# Patient Record
Sex: MALE | Race: WHITE | NOT HISPANIC OR LATINO | Employment: OTHER | URBAN - NONMETROPOLITAN AREA
[De-identification: names, ages, dates, MRNs, and addresses within clinical notes are randomized per-mention and may not be internally consistent; named-entity substitution may affect disease eponyms.]

---

## 2023-04-20 ENCOUNTER — TELEPHONE (OUTPATIENT)
Dept: PRIMARY CARE | Facility: CLINIC | Age: 88
End: 2023-04-20
Payer: MEDICARE

## 2023-04-20 NOTE — TELEPHONE ENCOUNTER
Transition of Care    Inpatient facility: [facility]  Discharge diagnosis: PALPITATIONS/IRREGULAR HEART RATE  Discharged to: HOME  Discharge date: 4/18/23  Initial Call date: 4/20/23  Spoke with patient/caregiver: SPOKE WITH WIFE  Do you need assistance  visits prior to your PCP visit: No  Home health care ordered: No  Are you taking medications as prescribed at discharge: Yes  Patient advised to bring all medications to PCP follow-up appointment.  Patient advised to follow discharge instructions until provider follow-up.  TCM visit date: 5/5/23  TCM provider visit with: DR. AVILA

## 2023-05-05 ENCOUNTER — OFFICE VISIT (OUTPATIENT)
Dept: PRIMARY CARE | Facility: CLINIC | Age: 88
End: 2023-05-05
Payer: MEDICARE

## 2023-05-05 VITALS
HEART RATE: 78 BPM | DIASTOLIC BLOOD PRESSURE: 60 MMHG | OXYGEN SATURATION: 97 % | TEMPERATURE: 96.4 F | SYSTOLIC BLOOD PRESSURE: 118 MMHG | WEIGHT: 156.9 LBS | BODY MASS INDEX: 23.17 KG/M2

## 2023-05-05 DIAGNOSIS — I49.3 PVC'S (PREMATURE VENTRICULAR CONTRACTIONS): Primary | ICD-10-CM

## 2023-05-05 PROCEDURE — 1159F MED LIST DOCD IN RCRD: CPT

## 2023-05-05 PROCEDURE — 99495 TRANSJ CARE MGMT MOD F2F 14D: CPT

## 2023-05-05 PROCEDURE — 1036F TOBACCO NON-USER: CPT

## 2023-05-05 RX ORDER — CARVEDILOL 25 MG/1
12.5 TABLET ORAL 2 TIMES DAILY
COMMUNITY
Start: 2018-09-03

## 2023-05-05 RX ORDER — FUROSEMIDE 40 MG/1
1 TABLET ORAL
COMMUNITY
Start: 2023-01-22

## 2023-05-05 RX ORDER — POTASSIUM CHLORIDE 1500 MG/1
1 TABLET, EXTENDED RELEASE ORAL
COMMUNITY
Start: 2023-01-22 | End: 2024-04-09 | Stop reason: ALTCHOICE

## 2023-05-05 RX ORDER — LISINOPRIL 10 MG/1
TABLET ORAL EVERY 12 HOURS
COMMUNITY
Start: 2020-03-03 | End: 2023-09-23

## 2023-05-05 ASSESSMENT — ENCOUNTER SYMPTOMS
LOSS OF SENSATION IN FEET: 0
DEPRESSION: 0
OCCASIONAL FEELINGS OF UNSTEADINESS: 0

## 2023-05-05 ASSESSMENT — COLUMBIA-SUICIDE SEVERITY RATING SCALE - C-SSRS
2. HAVE YOU ACTUALLY HAD ANY THOUGHTS OF KILLING YOURSELF?: NO
1. IN THE PAST MONTH, HAVE YOU WISHED YOU WERE DEAD OR WISHED YOU COULD GO TO SLEEP AND NOT WAKE UP?: NO
6. HAVE YOU EVER DONE ANYTHING, STARTED TO DO ANYTHING, OR PREPARED TO DO ANYTHING TO END YOUR LIFE?: NO

## 2023-05-05 ASSESSMENT — PATIENT HEALTH QUESTIONNAIRE - PHQ9
1. LITTLE INTEREST OR PLEASURE IN DOING THINGS: NOT AT ALL
SUM OF ALL RESPONSES TO PHQ9 QUESTIONS 1 AND 2: 0
2. FEELING DOWN, DEPRESSED OR HOPELESS: NOT AT ALL

## 2023-05-05 NOTE — PROGRESS NOTES
"Patient: Radhames Ken  : 1933  PCP: Keily Sage DO  MRN: [id number]  Program: No linked episodes     Radhames Ken is a 89 y.o. male presenting today for follow-up after being discharged from the hospital 14 days ago. The main problem requiring admission was Palpitations. The discharge summary and/or Transitional Care Management documentation was reviewed. Medication reconciliation was performed as indicated via the \"Paulino as Reviewed\" timestamp.     Radhames Ken was contacted by Transitional Care Management services two days after his discharge. This encounter and supporting documentation was reviewed.    The complexity of medical decision making for this patient's transitional care is moderate.    Review of Systems    Physical Exam     No family history on file.    No data recorded    Assessment/Plan   Problem List Items Addressed This Visit    None  Visit Diagnoses       PVC's (premature ventricular contractions)    -  Primary    Relevant Medications    carvedilol (Coreg) 25 mg tablet            No follow-ups on file.       "

## 2023-09-21 ENCOUNTER — OFFICE VISIT (OUTPATIENT)
Dept: PRIMARY CARE | Facility: CLINIC | Age: 88
End: 2023-09-21
Payer: MEDICARE

## 2023-09-21 VITALS — OXYGEN SATURATION: 92 % | TEMPERATURE: 98.4 F | HEART RATE: 86 BPM

## 2023-09-21 DIAGNOSIS — I49.3 PVC'S (PREMATURE VENTRICULAR CONTRACTIONS): ICD-10-CM

## 2023-09-21 DIAGNOSIS — J06.9 ACUTE URI: Primary | ICD-10-CM

## 2023-09-21 PROBLEM — L30.9 ECZEMATOUS DERMATITIS: Status: ACTIVE | Noted: 2023-09-21

## 2023-09-21 PROBLEM — R05.9 COUGH: Status: ACTIVE | Noted: 2023-09-21
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
## 2023-09-21 PROBLEM — R13.10 DYSPHAGIA: Status: ACTIVE | Noted: 2023-09-21

## 2023-09-21 PROBLEM — U07.1 PNEUMONIA DUE TO COVID-19 VIRUS: Status: ACTIVE | Noted: 2023-09-21

## 2023-09-21 PROBLEM — K40.90 INGUINAL HERNIA, RIGHT: Status: ACTIVE | Noted: 2023-09-21

## 2023-09-21 PROBLEM — Z86.010 HISTORY OF COLONIC POLYPS: Status: ACTIVE | Noted: 2023-09-21

## 2023-09-21 PROBLEM — K22.2 STRICTURE OF ESOPHAGUS: Status: ACTIVE | Noted: 2023-09-21

## 2023-09-21 PROBLEM — I10 HTN (HYPERTENSION): Status: ACTIVE | Noted: 2023-09-21

## 2023-09-21 PROBLEM — Z86.0100 HISTORY OF COLONIC POLYPS: Status: ACTIVE | Noted: 2023-09-21

## 2023-09-21 PROBLEM — D64.9 ANEMIA: Status: ACTIVE | Noted: 2023-09-21

## 2023-09-21 PROBLEM — R19.7 DIARRHEA: Status: ACTIVE | Noted: 2023-09-21

## 2023-09-21 PROBLEM — C44.91 BASAL CELL ADENOCARCINOMA: Status: ACTIVE | Noted: 2023-09-21

## 2023-09-21 PROBLEM — J12.82 PNEUMONIA DUE TO COVID-19 VIRUS: Status: ACTIVE | Noted: 2023-09-21

## 2023-09-21 PROBLEM — I63.9 ISCHEMIC STROKE (MULTI): Status: ACTIVE | Noted: 2023-09-21

## 2023-09-21 PROBLEM — K21.9 GERD (GASTROESOPHAGEAL REFLUX DISEASE): Status: ACTIVE | Noted: 2023-09-21

## 2023-09-21 PROBLEM — E78.5 HYPERLIPEMIA: Status: ACTIVE | Noted: 2023-09-21

## 2023-09-21 PROBLEM — H53.452 DECREASED PERIPHERAL VISION OF LEFT EYE: Status: ACTIVE | Noted: 2023-09-21

## 2023-09-21 PROBLEM — A28.0 PASTEURELLA CELLULITIS DUE TO DOG BITE: Status: ACTIVE | Noted: 2023-09-21

## 2023-09-21 PROBLEM — R11.10 VOMITING: Status: ACTIVE | Noted: 2023-09-21

## 2023-09-21 PROBLEM — K52.832 LYMPHOCYTIC COLITIS: Status: ACTIVE | Noted: 2023-09-21

## 2023-09-21 PROCEDURE — 87635 SARS-COV-2 COVID-19 AMP PRB: CPT

## 2023-09-21 PROCEDURE — 1159F MED LIST DOCD IN RCRD: CPT

## 2023-09-21 PROCEDURE — 99214 OFFICE O/P EST MOD 30 MIN: CPT

## 2023-09-21 PROCEDURE — 1036F TOBACCO NON-USER: CPT

## 2023-09-21 RX ORDER — AMOXICILLIN AND CLAVULANATE POTASSIUM 875; 125 MG/1; MG/1
875 TABLET, FILM COATED ORAL 2 TIMES DAILY
Qty: 14 TABLET | Refills: 0 | Status: SHIPPED | OUTPATIENT
Start: 2023-09-21 | End: 2023-09-28

## 2023-09-21 NOTE — PROGRESS NOTES
Subjective   Patient ID: Radhames Ken is a 89 y.o. male who presents for Cough (Radhames is being seen today for coughing for about a week. Does feel like he has some phlegm that is stuck but not coming up. Spouse stated that he has had this before and was pneumonia. ).  Subjective  Radhames Ken is a 89 y.o. male who presents for evaluation of symptoms of a URI. Symptoms include cough described as nonproductive. Onset of symptoms was 5 days ago and has been gradually worsening since that time. Treatment to date: cough suppressants.    Objective  [unfilled]     Assessment/Plan  bronchitis and viral upper respiratory illness.    Discussed diagnosis and treatment of URI.  Suggested symptomatic OTC remedies.  Nasal saline spray for congestion.  Follow up as needed.          Vitals:    09/21/23 1438   Pulse: 86   Temp: 36.9 °C (98.4 °F)   SpO2: 92%       Review of Systems    Objective   Physical Exam    Assessment/Plan   Problem List Items Addressed This Visit    None           Thank you for coming in today, please call my office if you have any concerns or questions.     Harman CARREON, CNP

## 2023-09-22 ENCOUNTER — TELEPHONE (OUTPATIENT)
Dept: PRIMARY CARE | Facility: CLINIC | Age: 88
End: 2023-09-22
Payer: MEDICARE

## 2023-09-22 DIAGNOSIS — I10 ESSENTIAL (PRIMARY) HYPERTENSION: ICD-10-CM

## 2023-09-22 LAB — SARS-COV-2 RESULT: NOT DETECTED

## 2023-09-23 RX ORDER — LISINOPRIL 10 MG/1
10 TABLET ORAL 2 TIMES DAILY
Qty: 180 TABLET | Refills: 2 | Status: SHIPPED | OUTPATIENT
Start: 2023-09-23 | End: 2024-04-24

## 2023-10-02 ENCOUNTER — HOSPITAL ENCOUNTER (OUTPATIENT)
Dept: RADIOLOGY | Facility: HOSPITAL | Age: 88
Discharge: HOME | End: 2023-10-02
Payer: MEDICARE

## 2023-10-02 ENCOUNTER — OFFICE VISIT (OUTPATIENT)
Dept: PRIMARY CARE | Facility: CLINIC | Age: 88
End: 2023-10-02
Payer: MEDICARE

## 2023-10-02 VITALS — HEART RATE: 76 BPM | OXYGEN SATURATION: 95 % | TEMPERATURE: 97.4 F

## 2023-10-02 DIAGNOSIS — J06.9 ACUTE URI: ICD-10-CM

## 2023-10-02 DIAGNOSIS — R05.1 ACUTE COUGH: Primary | ICD-10-CM

## 2023-10-02 PROCEDURE — 1159F MED LIST DOCD IN RCRD: CPT

## 2023-10-02 PROCEDURE — 99214 OFFICE O/P EST MOD 30 MIN: CPT

## 2023-10-02 PROCEDURE — 71046 X-RAY EXAM CHEST 2 VIEWS: CPT | Mod: FY

## 2023-10-02 PROCEDURE — 71046 X-RAY EXAM CHEST 2 VIEWS: CPT | Performed by: RADIOLOGY

## 2023-10-02 PROCEDURE — 1036F TOBACCO NON-USER: CPT

## 2023-10-02 RX ORDER — PREDNISONE 10 MG/1
10 TABLET ORAL 3 TIMES DAILY
Qty: 9 TABLET | Refills: 0 | Status: SHIPPED | OUTPATIENT
Start: 2023-10-02 | End: 2023-10-05

## 2023-10-02 RX ORDER — AZITHROMYCIN 250 MG/1
TABLET, FILM COATED ORAL
Qty: 6 TABLET | Refills: 0 | Status: SHIPPED | OUTPATIENT
Start: 2023-10-02 | End: 2023-10-07

## 2023-10-02 RX ORDER — ALBUTEROL SULFATE 90 UG/1
2 AEROSOL, METERED RESPIRATORY (INHALATION) EVERY 4 HOURS PRN
Qty: 8 G | Refills: 5 | Status: SHIPPED | OUTPATIENT
Start: 2023-10-02 | End: 2023-11-06 | Stop reason: SINTOL

## 2023-10-02 NOTE — PROGRESS NOTES
Subjective   Patient ID: Radhames Ken is a 89 y.o. male who presents for Cough (Radhames is being seen today for a cough. Has been worsening with some clear mucous production. Was not aware of xray orders. ).  Subjective  Patient ID: Radhames Ken is a 89 y.o. male here for follow-up of cough. Onset of symptoms was 2 weeks ago. Symptoms are gradually worsening since that time. He was treated with symptomatic meds and antibiotics. Persistent symptoms include productive cough.     Patient presents with:  Cough: Radhames is being seen today for a cough. Has been worsening with some clear mucous production. Was not aware of xray orders.     @HPI@    The following portions of the chart were reviewed this encounter and updated as appropriate:         [unfilled]    Objective  [unfilled]    Assessment/Plan  Problem List Items Addressed This Visit           ICD-10-CM    Cough - Primary R05.9    Relevant Medications    azithromycin (Zithromax) 250 mg tablet    predniSONE (Deltasone) 10 mg tablet    Other Relevant Orders    RSV PCR    Sars-CoV-2 PCR, Symptomatic    Influenza A, and B PCR   Other Visit Diagnoses       Codes    Acute URI     J06.9    Relevant Medications    dextromethorphan-guaifenesin (Mucinex DM)  mg 12 hr tablet              Vitals:    10/02/23 1348   Pulse: 76   Temp: 36.3 °C (97.4 °F)   SpO2: 95%       Review of Systems    Objective   Physical Exam    Assessment/Plan   Problem List Items Addressed This Visit       Cough - Primary    Relevant Medications    azithromycin (Zithromax) 250 mg tablet    predniSONE (Deltasone) 10 mg tablet    Other Relevant Orders    Influenza A, and B PCR    RSV PCR    Sars-CoV-2 PCR, Symptomatic     Other Visit Diagnoses       Acute URI        Relevant Medications    dextromethorphan-guaifenesin (Mucinex DM)  mg 12 hr tablet                 Thank you for coming in today, please call my office if you have any concerns or questions.     Harman CARREON, CNP

## 2023-10-03 LAB
FLUAV RNA RESP QL NAA+PROBE: NOT DETECTED
FLUBV RNA RESP QL NAA+PROBE: NOT DETECTED
RSV RNA RESP QL NAA+PROBE: NOT DETECTED
SARS-COV-2 RNA RESP QL NAA+PROBE: NOT DETECTED

## 2023-10-04 ENCOUNTER — TELEPHONE (OUTPATIENT)
Dept: PRIMARY CARE | Facility: CLINIC | Age: 88
End: 2023-10-04
Payer: MEDICARE

## 2023-10-06 ENCOUNTER — TELEPHONE (OUTPATIENT)
Dept: PRIMARY CARE | Facility: CLINIC | Age: 88
End: 2023-10-06
Payer: MEDICARE

## 2023-11-06 ENCOUNTER — OFFICE VISIT (OUTPATIENT)
Dept: PRIMARY CARE | Facility: CLINIC | Age: 88
End: 2023-11-06
Payer: MEDICARE

## 2023-11-06 VITALS
DIASTOLIC BLOOD PRESSURE: 70 MMHG | WEIGHT: 156 LBS | HEART RATE: 85 BPM | SYSTOLIC BLOOD PRESSURE: 130 MMHG | BODY MASS INDEX: 23.04 KG/M2 | TEMPERATURE: 99 F | OXYGEN SATURATION: 98 %

## 2023-11-06 DIAGNOSIS — R05.1 ACUTE COUGH: Primary | ICD-10-CM

## 2023-11-06 PROCEDURE — 3075F SYST BP GE 130 - 139MM HG: CPT | Performed by: FAMILY MEDICINE

## 2023-11-06 PROCEDURE — 1159F MED LIST DOCD IN RCRD: CPT | Performed by: FAMILY MEDICINE

## 2023-11-06 PROCEDURE — 1036F TOBACCO NON-USER: CPT | Performed by: FAMILY MEDICINE

## 2023-11-06 PROCEDURE — 99214 OFFICE O/P EST MOD 30 MIN: CPT | Performed by: FAMILY MEDICINE

## 2023-11-06 PROCEDURE — 3078F DIAST BP <80 MM HG: CPT | Performed by: FAMILY MEDICINE

## 2023-11-06 RX ORDER — AZITHROMYCIN 250 MG/1
TABLET, FILM COATED ORAL
Qty: 6 TABLET | Refills: 0 | Status: SHIPPED | OUTPATIENT
Start: 2023-11-06 | End: 2023-11-11

## 2023-11-06 RX ORDER — PREDNISONE 10 MG/1
TABLET ORAL
Qty: 9 TABLET | Refills: 0 | OUTPATIENT
Start: 2023-11-06 | End: 2023-11-22

## 2023-11-06 NOTE — PATIENT INSTRUCTIONS
Water and rest  Sent z-vince and a short coarse of prednisone  See cardiology if this does not resolve

## 2023-11-09 ASSESSMENT — ENCOUNTER SYMPTOMS
JOINT SWELLING: 0
UNEXPECTED WEIGHT CHANGE: 0
HEADACHES: 0
ABDOMINAL PAIN: 0
COUGH: 1
DYSPHORIC MOOD: 0
SLEEP DISTURBANCE: 0
DYSURIA: 0
RHINORRHEA: 0
PALPITATIONS: 0
BLOOD IN STOOL: 0
LIGHT-HEADEDNESS: 0
MYALGIAS: 0
WEAKNESS: 0
DIZZINESS: 0
VOMITING: 0
NERVOUS/ANXIOUS: 0
EYE DISCHARGE: 0
HEMATURIA: 0
WHEEZING: 0
SHORTNESS OF BREATH: 0
FEVER: 0
SINUS PRESSURE: 0
APPETITE CHANGE: 0
NAUSEA: 0
NUMBNESS: 0
FATIGUE: 1
DIARRHEA: 0
ARTHRALGIAS: 0
SORE THROAT: 0
FLANK PAIN: 0
CONSTIPATION: 0
ACTIVITY CHANGE: 0
EYE ITCHING: 0

## 2023-11-09 NOTE — PROGRESS NOTES
Subjective   Patient ID: Radhames Ken is a 89 y.o. male who presents for chest congestion (Able to cough some phlegm at times. X 2 weeks. Looks white when expelled./Treated by Shalom and it did help, but came back.).    HPI HE ALWAYS FEELS LIKE HE HAS PHLEGM THAT HE CAN'T COUGH OUT OR CLEAR   HE WAS TREATED P[PREVIOUSLY WITH ANTIBIOTIC AND STEROIDS AND HE THOUGHT HE WAS BETTER FOR AWHILE BUT NOW THE SAME SYMPTOMS ARE BACK     Review of Systems   Constitutional:  Positive for fatigue. Negative for activity change, appetite change, fever and unexpected weight change.   HENT:  Negative for congestion, ear pain, postnasal drip, rhinorrhea, sinus pressure and sore throat.    Eyes:  Negative for discharge, itching and visual disturbance.   Respiratory:  Positive for cough. Negative for shortness of breath and wheezing.    Cardiovascular:  Negative for chest pain, palpitations and leg swelling.   Gastrointestinal:  Negative for abdominal pain, blood in stool, constipation, diarrhea, nausea and vomiting.   Endocrine: Negative for cold intolerance, heat intolerance and polyuria.   Genitourinary:  Negative for dysuria, flank pain and hematuria.   Musculoskeletal:  Negative for arthralgias, gait problem, joint swelling and myalgias.   Skin:  Negative for rash.   Allergic/Immunologic: Negative for environmental allergies and food allergies.   Neurological:  Negative for dizziness, syncope, weakness, light-headedness, numbness and headaches.   Psychiatric/Behavioral:  Negative for dysphoric mood and sleep disturbance. The patient is not nervous/anxious.        Objective   /70   Pulse 85   Temp 37.2 °C (99 °F)   Wt 70.8 kg (156 lb)   SpO2 98%   BMI 23.04 kg/m²     Physical Exam  Vitals and nursing note reviewed.   Constitutional:       Appearance: Normal appearance.   HENT:      Head: Normocephalic.      Mouth/Throat:      Mouth: Mucous membranes are moist.   Cardiovascular:      Rate and Rhythm: Normal rate and  regular rhythm.      Pulses: Normal pulses.      Heart sounds: Normal heart sounds. No murmur heard.     No friction rub. No gallop.   Pulmonary:      Effort: Pulmonary effort is normal. No respiratory distress.      Breath sounds: Normal breath sounds. No wheezing, rhonchi or rales.   Abdominal:      General: Bowel sounds are normal. There is no distension.      Palpations: Abdomen is soft.      Tenderness: There is no abdominal tenderness.   Musculoskeletal:         General: No deformity. Normal range of motion.      Right lower leg: No edema.      Left lower leg: No edema.   Skin:     General: Skin is warm and dry.      Capillary Refill: Capillary refill takes less than 2 seconds.   Neurological:      General: No focal deficit present.      Mental Status: He is alert and oriented to person, place, and time.   Psychiatric:         Mood and Affect: Mood normal.         Assessment/Plan   Diagnoses and all orders for this visit:  Acute cough  -     predniSONE (Deltasone) 10 mg tablet; One three times daily for three days  -     azithromycin (Zithromax) 250 mg tablet; Take 2 tablets (500 mg) by mouth once daily for 1 day, THEN 1 tablet (250 mg) once daily for 4 days. Take 2 tabs (500 mg) by mouth today, than 1 daily for 4 days..

## 2023-11-15 ENCOUNTER — TELEPHONE (OUTPATIENT)
Dept: PRIMARY CARE | Facility: CLINIC | Age: 88
End: 2023-11-15
Payer: MEDICARE

## 2023-11-15 NOTE — TELEPHONE ENCOUNTER
Spoke with daughter, Wiliam, (HIPAA) who verbalized understanding and will reach out to his cardiologist's office to see if he needs to be seen sooner than 11/27

## 2023-11-15 NOTE — TELEPHONE ENCOUNTER
Daughter called stating patient is still wheezing and coughing intermittently. He has completed his medication. Not sure what else can be done. Was just seen in office 11/9/23 for these symptoms.

## 2023-11-20 ENCOUNTER — LAB (OUTPATIENT)
Dept: LAB | Facility: LAB | Age: 88
End: 2023-11-20
Payer: MEDICARE

## 2023-11-20 ENCOUNTER — TELEPHONE (OUTPATIENT)
Dept: PRIMARY CARE | Facility: CLINIC | Age: 88
End: 2023-11-20
Payer: MEDICARE

## 2023-11-20 ENCOUNTER — HOSPITAL ENCOUNTER (OUTPATIENT)
Dept: RADIOLOGY | Facility: HOSPITAL | Age: 88
Discharge: HOME | End: 2023-11-20
Payer: MEDICARE

## 2023-11-20 DIAGNOSIS — R06.02 SHORTNESS OF BREATH: ICD-10-CM

## 2023-11-20 DIAGNOSIS — R05.3 CHRONIC COUGH: Primary | ICD-10-CM

## 2023-11-20 DIAGNOSIS — R05.3 CHRONIC COUGH: ICD-10-CM

## 2023-11-20 LAB
ANION GAP SERPL CALC-SCNC: 9 MMOL/L (ref 10–20)
BASOPHILS # BLD AUTO: 0.07 X10*3/UL (ref 0–0.1)
BASOPHILS NFR BLD AUTO: 0.6 %
BNP SERPL-MCNC: 502 PG/ML (ref 0–99)
BUN SERPL-MCNC: 20 MG/DL (ref 6–23)
CALCIUM SERPL-MCNC: 8.8 MG/DL (ref 8.6–10.3)
CHLORIDE SERPL-SCNC: 97 MMOL/L (ref 98–107)
CO2 SERPL-SCNC: 31 MMOL/L (ref 21–32)
CREAT SERPL-MCNC: 1.02 MG/DL (ref 0.5–1.3)
EOSINOPHIL # BLD AUTO: 0.37 X10*3/UL (ref 0–0.4)
EOSINOPHIL NFR BLD AUTO: 3.1 %
ERYTHROCYTE [DISTWIDTH] IN BLOOD BY AUTOMATED COUNT: 13.2 % (ref 11.5–14.5)
GFR SERPL CREATININE-BSD FRML MDRD: 70 ML/MIN/1.73M*2
GLUCOSE SERPL-MCNC: 130 MG/DL (ref 74–99)
HCT VFR BLD AUTO: 46.6 % (ref 41–52)
HGB BLD-MCNC: 15.4 G/DL (ref 13.5–17.5)
IMM GRANULOCYTES # BLD AUTO: 0.06 X10*3/UL (ref 0–0.5)
IMM GRANULOCYTES NFR BLD AUTO: 0.5 % (ref 0–0.9)
LYMPHOCYTES # BLD AUTO: 0.88 X10*3/UL (ref 0.8–3)
LYMPHOCYTES NFR BLD AUTO: 7.3 %
MCH RBC QN AUTO: 30.7 PG (ref 26–34)
MCHC RBC AUTO-ENTMCNC: 33 G/DL (ref 32–36)
MCV RBC AUTO: 93 FL (ref 80–100)
MONOCYTES # BLD AUTO: 0.78 X10*3/UL (ref 0.05–0.8)
MONOCYTES NFR BLD AUTO: 6.5 %
NEUTROPHILS # BLD AUTO: 9.89 X10*3/UL (ref 1.6–5.5)
NEUTROPHILS NFR BLD AUTO: 82 %
NRBC BLD-RTO: 0 /100 WBCS (ref 0–0)
PLATELET # BLD AUTO: 142 X10*3/UL (ref 150–450)
POTASSIUM SERPL-SCNC: 3.9 MMOL/L (ref 3.5–5.3)
RBC # BLD AUTO: 5.02 X10*6/UL (ref 4.5–5.9)
SODIUM SERPL-SCNC: 133 MMOL/L (ref 136–145)
WBC # BLD AUTO: 12.1 X10*3/UL (ref 4.4–11.3)

## 2023-11-20 PROCEDURE — 36415 COLL VENOUS BLD VENIPUNCTURE: CPT

## 2023-11-20 PROCEDURE — 71046 X-RAY EXAM CHEST 2 VIEWS: CPT

## 2023-11-20 PROCEDURE — 83880 ASSAY OF NATRIURETIC PEPTIDE: CPT

## 2023-11-20 PROCEDURE — 71046 X-RAY EXAM CHEST 2 VIEWS: CPT | Performed by: RADIOLOGY

## 2023-11-20 PROCEDURE — 80048 BASIC METABOLIC PNL TOTAL CA: CPT

## 2023-11-20 PROCEDURE — 85025 COMPLETE CBC W/AUTO DIFF WBC: CPT

## 2023-11-20 NOTE — TELEPHONE ENCOUNTER
Daughter Wiliam [phone]. Radhames has appt with cardiology on 11-27.  Due to his cough she was wondering if you would order a Xray.

## 2023-11-22 ENCOUNTER — HOSPITAL ENCOUNTER (EMERGENCY)
Facility: HOSPITAL | Age: 88
Discharge: HOME | End: 2023-11-22
Payer: MEDICARE

## 2023-11-22 ENCOUNTER — APPOINTMENT (OUTPATIENT)
Dept: CARDIOLOGY | Facility: HOSPITAL | Age: 88
End: 2023-11-22
Payer: MEDICARE

## 2023-11-22 ENCOUNTER — PHARMACY VISIT (OUTPATIENT)
Dept: PHARMACY | Facility: CLINIC | Age: 88
End: 2023-11-22
Payer: COMMERCIAL

## 2023-11-22 ENCOUNTER — APPOINTMENT (OUTPATIENT)
Dept: RADIOLOGY | Facility: HOSPITAL | Age: 88
End: 2023-11-22
Payer: MEDICARE

## 2023-11-22 VITALS
DIASTOLIC BLOOD PRESSURE: 81 MMHG | OXYGEN SATURATION: 95 % | SYSTOLIC BLOOD PRESSURE: 138 MMHG | TEMPERATURE: 97.5 F | HEIGHT: 71 IN | WEIGHT: 162.04 LBS | HEART RATE: 77 BPM | BODY MASS INDEX: 22.69 KG/M2 | RESPIRATION RATE: 20 BRPM

## 2023-11-22 VITALS — HEART RATE: 74 BPM | OXYGEN SATURATION: 95 % | RESPIRATION RATE: 20 BRPM

## 2023-11-22 DIAGNOSIS — I50.32 CHRONIC DIASTOLIC (CONGESTIVE) HEART FAILURE (MULTI): ICD-10-CM

## 2023-11-22 DIAGNOSIS — J18.9 PNEUMONIA OF BOTH LOWER LOBES DUE TO INFECTIOUS ORGANISM: Primary | ICD-10-CM

## 2023-11-22 LAB
ALBUMIN SERPL BCP-MCNC: 3.2 G/DL (ref 3.4–5)
ALP SERPL-CCNC: 88 U/L (ref 33–136)
ALT SERPL W P-5'-P-CCNC: 13 U/L (ref 10–52)
ANION GAP SERPL CALC-SCNC: 11 MMOL/L (ref 10–20)
AST SERPL W P-5'-P-CCNC: 13 U/L (ref 9–39)
BASOPHILS # BLD AUTO: 0.06 X10*3/UL (ref 0–0.1)
BASOPHILS NFR BLD AUTO: 0.7 %
BILIRUB SERPL-MCNC: 1.5 MG/DL (ref 0–1.2)
BNP SERPL-MCNC: 391 PG/ML (ref 0–99)
BUN SERPL-MCNC: 19 MG/DL (ref 6–23)
CALCIUM SERPL-MCNC: 8.7 MG/DL (ref 8.6–10.3)
CARDIAC TROPONIN I PNL SERPL HS: 21 NG/L (ref 0–20)
CARDIAC TROPONIN I PNL SERPL HS: 23 NG/L (ref 0–20)
CHLORIDE SERPL-SCNC: 96 MMOL/L (ref 98–107)
CO2 SERPL-SCNC: 28 MMOL/L (ref 21–32)
CREAT SERPL-MCNC: 0.98 MG/DL (ref 0.5–1.3)
D DIMER PPP FEU-MCNC: 3366 NG/ML FEU
EOSINOPHIL # BLD AUTO: 0.56 X10*3/UL (ref 0–0.4)
EOSINOPHIL NFR BLD AUTO: 6.4 %
ERYTHROCYTE [DISTWIDTH] IN BLOOD BY AUTOMATED COUNT: 13.1 % (ref 11.5–14.5)
FLUAV RNA RESP QL NAA+PROBE: NOT DETECTED
FLUBV RNA RESP QL NAA+PROBE: NOT DETECTED
GFR SERPL CREATININE-BSD FRML MDRD: 73 ML/MIN/1.73M*2
GLUCOSE SERPL-MCNC: 174 MG/DL (ref 74–99)
HCT VFR BLD AUTO: 44.8 % (ref 41–52)
HGB BLD-MCNC: 15.1 G/DL (ref 13.5–17.5)
IMM GRANULOCYTES # BLD AUTO: 0.02 X10*3/UL (ref 0–0.5)
IMM GRANULOCYTES NFR BLD AUTO: 0.2 % (ref 0–0.9)
INR PPP: 1.2 (ref 0.9–1.1)
LYMPHOCYTES # BLD AUTO: 0.85 X10*3/UL (ref 0.8–3)
LYMPHOCYTES NFR BLD AUTO: 9.7 %
MAGNESIUM SERPL-MCNC: 2 MG/DL (ref 1.6–2.4)
MCH RBC QN AUTO: 30.7 PG (ref 26–34)
MCHC RBC AUTO-ENTMCNC: 33.7 G/DL (ref 32–36)
MCV RBC AUTO: 91 FL (ref 80–100)
MONOCYTES # BLD AUTO: 0.47 X10*3/UL (ref 0.05–0.8)
MONOCYTES NFR BLD AUTO: 5.4 %
NEUTROPHILS # BLD AUTO: 6.78 X10*3/UL (ref 1.6–5.5)
NEUTROPHILS NFR BLD AUTO: 77.6 %
NRBC BLD-RTO: 0 /100 WBCS (ref 0–0)
PLATELET # BLD AUTO: 165 X10*3/UL (ref 150–450)
POTASSIUM SERPL-SCNC: 3.5 MMOL/L (ref 3.5–5.3)
PROT SERPL-MCNC: 5.7 G/DL (ref 6.4–8.2)
PROTHROMBIN TIME: 13.5 SECONDS (ref 9.8–12.8)
RBC # BLD AUTO: 4.92 X10*6/UL (ref 4.5–5.9)
SARS-COV-2 RNA RESP QL NAA+PROBE: NOT DETECTED
SODIUM SERPL-SCNC: 131 MMOL/L (ref 136–145)
WBC # BLD AUTO: 8.7 X10*3/UL (ref 4.4–11.3)

## 2023-11-22 PROCEDURE — 85610 PROTHROMBIN TIME: CPT | Performed by: EMERGENCY MEDICINE

## 2023-11-22 PROCEDURE — 85379 FIBRIN DEGRADATION QUANT: CPT | Performed by: EMERGENCY MEDICINE

## 2023-11-22 PROCEDURE — 85025 COMPLETE CBC W/AUTO DIFF WBC: CPT | Performed by: EMERGENCY MEDICINE

## 2023-11-22 PROCEDURE — 2500000002 HC RX 250 W HCPCS SELF ADMINISTERED DRUGS (ALT 637 FOR MEDICARE OP, ALT 636 FOR OP/ED): Performed by: PHYSICIAN ASSISTANT

## 2023-11-22 PROCEDURE — 99285 EMERGENCY DEPT VISIT HI MDM: CPT | Mod: 25

## 2023-11-22 PROCEDURE — 84484 ASSAY OF TROPONIN QUANT: CPT | Performed by: EMERGENCY MEDICINE

## 2023-11-22 PROCEDURE — 2550000001 HC RX 255 CONTRASTS: Performed by: PHYSICIAN ASSISTANT

## 2023-11-22 PROCEDURE — 71045 X-RAY EXAM CHEST 1 VIEW: CPT | Performed by: RADIOLOGY

## 2023-11-22 PROCEDURE — 36415 COLL VENOUS BLD VENIPUNCTURE: CPT | Performed by: EMERGENCY MEDICINE

## 2023-11-22 PROCEDURE — 87636 SARSCOV2 & INF A&B AMP PRB: CPT | Performed by: EMERGENCY MEDICINE

## 2023-11-22 PROCEDURE — 80053 COMPREHEN METABOLIC PANEL: CPT | Performed by: EMERGENCY MEDICINE

## 2023-11-22 PROCEDURE — 2500000004 HC RX 250 GENERAL PHARMACY W/ HCPCS (ALT 636 FOR OP/ED): Performed by: PHYSICIAN ASSISTANT

## 2023-11-22 PROCEDURE — 83735 ASSAY OF MAGNESIUM: CPT | Performed by: EMERGENCY MEDICINE

## 2023-11-22 PROCEDURE — 83880 ASSAY OF NATRIURETIC PEPTIDE: CPT | Performed by: EMERGENCY MEDICINE

## 2023-11-22 PROCEDURE — 71045 X-RAY EXAM CHEST 1 VIEW: CPT

## 2023-11-22 PROCEDURE — 71275 CT ANGIOGRAPHY CHEST: CPT

## 2023-11-22 PROCEDURE — 71275 CT ANGIOGRAPHY CHEST: CPT | Performed by: RADIOLOGY

## 2023-11-22 PROCEDURE — 94640 AIRWAY INHALATION TREATMENT: CPT

## 2023-11-22 PROCEDURE — RXMED WILLOW AMBULATORY MEDICATION CHARGE

## 2023-11-22 PROCEDURE — 93306 TTE W/DOPPLER COMPLETE: CPT

## 2023-11-22 RX ORDER — DOXYCYCLINE 100 MG/1
100 TABLET ORAL 2 TIMES DAILY
Qty: 14 TABLET | Refills: 0 | Status: SHIPPED | OUTPATIENT
Start: 2023-11-22 | End: 2023-11-29

## 2023-11-22 RX ORDER — IPRATROPIUM BROMIDE AND ALBUTEROL SULFATE 2.5; .5 MG/3ML; MG/3ML
3 SOLUTION RESPIRATORY (INHALATION)
Status: DISCONTINUED | OUTPATIENT
Start: 2023-11-22 | End: 2023-11-22 | Stop reason: HOSPADM

## 2023-11-22 RX ORDER — PREDNISONE 20 MG/1
20 TABLET ORAL 2 TIMES DAILY
Qty: 10 TABLET | Refills: 0 | Status: SHIPPED | OUTPATIENT
Start: 2023-11-22 | End: 2023-11-27

## 2023-11-22 RX ORDER — IPRATROPIUM BROMIDE AND ALBUTEROL SULFATE 2.5; .5 MG/3ML; MG/3ML
3 SOLUTION RESPIRATORY (INHALATION)
Qty: 360 ML | Refills: 0 | Status: SHIPPED | OUTPATIENT
Start: 2023-11-22 | End: 2024-01-22 | Stop reason: SDUPTHER

## 2023-11-22 RX ADMIN — IOHEXOL 75 ML: 350 INJECTION, SOLUTION INTRAVENOUS at 12:57

## 2023-11-22 RX ADMIN — IPRATROPIUM BROMIDE AND ALBUTEROL SULFATE 3 ML: .5; 3 SOLUTION RESPIRATORY (INHALATION) at 19:00

## 2023-11-22 RX ADMIN — METHYLPREDNISOLONE SODIUM SUCCINATE 125 MG: 125 INJECTION, POWDER, FOR SOLUTION INTRAMUSCULAR; INTRAVENOUS at 17:40

## 2023-11-22 ASSESSMENT — PAIN - FUNCTIONAL ASSESSMENT
PAIN_FUNCTIONAL_ASSESSMENT: 0-10
PAIN_FUNCTIONAL_ASSESSMENT: 0-10

## 2023-11-22 ASSESSMENT — COLUMBIA-SUICIDE SEVERITY RATING SCALE - C-SSRS
1. IN THE PAST MONTH, HAVE YOU WISHED YOU WERE DEAD OR WISHED YOU COULD GO TO SLEEP AND NOT WAKE UP?: NO
6. HAVE YOU EVER DONE ANYTHING, STARTED TO DO ANYTHING, OR PREPARED TO DO ANYTHING TO END YOUR LIFE?: NO
2. HAVE YOU ACTUALLY HAD ANY THOUGHTS OF KILLING YOURSELF?: NO

## 2023-11-22 ASSESSMENT — PAIN SCALES - GENERAL
PAINLEVEL_OUTOF10: 0 - NO PAIN
PAINLEVEL_OUTOF10: 0 - NO PAIN

## 2023-11-22 NOTE — ED NOTES
Pt came in from home via private vehicle where he lives independently w/wife denied any use of a walker, cane or oxygen he is Ao x3 but a poor historian Pt SOB since October has an appt for and echo next Monday w/ Dr Dotson but per wife Dr matteo Sage stated not to wait so they brought him to the ER pt has audible expiratory wheezing he takes lasix daily no increased edema he denied injury trauma CP N/V/D constipation fever or urinary symptoms wife at bedside call bell w/in reach safety maintained     Myrna De Leon RN  11/22/23 0415

## 2023-11-22 NOTE — ED PROVIDER NOTES
HPI   No chief complaint on file.      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    Diarrhea, functional   • Other disorders of plasma-protein metabolism, not elsewhere classified 05/30/2017    Serum albumin decreased   • 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   • Personal history of diseases of the skin and subcutaneous tissue 02/18/2019    History of eczema   • Personal history of other diseases of male genital organs     History of BPH   • Personal history of other diseases of the circulatory system     History of hypertension   • Personal history of other diseases of the circulatory system     History of mitral valve insufficiency   • Personal history of other diseases of the musculoskeletal system and connective tissue     Personal history of arthritis   • Personal history of other endocrine, nutritional and metabolic disease     History of hyperlipidemia   • Personal history of other specified conditions 11/09/2018    History of seizure   • Personal history of other specified conditions 05/05/2022    History of vomiting     Past Surgical History:   Procedure Laterality Date   • CARDIAC SURGERY  04/28/2014    Heart Surgery   • CHOLECYSTECTOMY  04/28/2014    Cholecystectomy   • GALLBLADDER SURGERY  04/28/2014    Gallbladder Surgery   • MR HEAD ANGIO WO IV CONTRAST  9/30/2018    MR HEAD ANGIO WO IV CONTRAST 9/30/2018 [facility] CLINICAL  LEGACY   • MR NECK ANGIO WO IV CONTRAST  9/30/2018    MR NECK ANGIO WO IV CONTRAST 9/30/2018 [facility] CLINICAL LEGACY   • OTHER SURGICAL HISTORY  04/28/2014    Knee Repair   • OTHER SURGICAL HISTORY  04/28/2014    Needle Biopsy Of Prostate   • OTHER SURGICAL HISTORY  03/05/2021    Complete colonoscopy   • OTHER SURGICAL HISTORY  03/05/2021    Endoscopy   • OTHER SURGICAL HISTORY  10/08/2018    Common Bile Duct Stone Removal   • PROSTATE SURGERY  10/08/2018    Prostate Surgery     Family History   Problem Relation Name Age of Onset   • Heart disease Mother     • Breast cancer Mother     • Lung cancer Father     • Cancer Brother     • Cancer Brother     • Cancer Brother     • Cancer Brother       Social History     Tobacco Use   • Smoking status: Never   • Smokeless tobacco: Never   Vaping Use   • Vaping Use: Never used   Substance Use Topics   • Alcohol use: Not Currently   • Drug use: Never       Physical Exam   ED Triage Vitals [11/22/23 1724]   Temp Heart Rate Resp BP   -- 74 20 --      SpO2 Temp src Heart Rate Source Patient Position   95 % -- -- --      BP Location FiO2 (%)     -- --       Physical Exam    ED Course & MDM   ED Course as of 11/25/23 2130 Wed Nov 22, 2023 2244 EKG taken at 1020 on November 22, 2023 shows sinus rhythm at 88 with frequent PVCs and left axis deviation no ST elevation depression no other acute findings [JF]      ED Course User Index  [JF] Juan Hernandez PA-C         Diagnoses as of 11/25/23 2130   Pneumonia of both lower lobes due to infectious organism       Medical Decision Making      Procedure  Procedures     Juan Hernandez PA-C  11/22/23 2053       Juan Hernandez PA-C  11/25/23 2130

## 2023-11-23 NOTE — ED PROVIDER NOTES
HPI   Chief Complaint   Patient presents with    Shortness of Breath     Pt SOB since October has an appt for and echo next Monday w/ Dr Dotson but per wife Dr matteo Sage stated not to wait so they brought him to the ER       History of present illness:  89-year-old male presents to the emergency room for complaints of wheezing, shortness of breath and general fatigue.  The patient is companied by his family who states that the patient has a history of aortic stenosis and he is scheduled to have a valve replacement performed next week.  They state that they are concerned as he also has a history of heart failure coronary disease and states that he has not had any chest pain but he has been come increasingly short of breath and they are concerned that he is becoming fluid overloaded.  He states he has not had a swelling in his legs and they state that he has been acting appropriate otherwise.  He states he has albuterol inhaler he has been needing as needed.  The patient at this time states that he feels okay while sitting here talking to the practitioner but states he quickly becomes short of breath with exertion.  They contacted patient's cardiologist yesterday and were told that he could have an echocardiogram done in outpatient setting next week.    Social history: Negative for alcohol and drug use.    Review of systems:   Gen.: No weight loss,  fever.   Eyes: No vision loss  ENT: No pharyngitis, neck pain  Cardiac: No chest pain, palpitations, syncope, near syncope.   Pulmonary: No  hemoptysis.   Heme/lymph: No swollen glands, fever, bleeding.   GI: No abdominal pain, change in bowel habits, melena, hematemesis, hematochezia, nausea, vomiting, diarrhea.   : No discharge, dysuria, frequency, urgency, hematuria.   Musculoskeletal: No limb pain, joint pain, joint swelling.   Skin: No rashes.   Review of systems is otherwise negative unless stated above or in history of present illness.        Physical  exam:  General: Vitals noted, no distress. Afebrile.   EENT: No lymphadenopathy appreciated  Cardiac: Regular, rate, rhythm, no murmur.   Pulmonary: Lungs clear bilaterally with good aeration. No adventitious breath sounds.   Abdomen: Soft, nonsurgical. Nontender. No peritoneal signs. Normoactive bowel sounds.   Extremities: No peripheral edema.   Skin: No rash.   Neuro: No focal neurologic deficits        Medical decision making:   Testing: CBC, CMP, BNP, troponin x2, EKG:  BNP was slightly elevated first troponin is 23 but second run was 21, D-dimer is elevated and CT scan of the chest was performed which showed concerns for bilateral pneumonia in the bases, all imaging was interpreted by radiology.  Transthoracic echocardiogram was also performed at this time  Plan: Home-going.  Discussed differential. Will follow-up with the primary physician in the next 2-3 days. Return if worse. They understand return precautions and discharge instructions. Patient and family/friend/caregiver are in agreement with this plan. 89-year-old male presents to the emergency room for complaints of wheezing, shortness of breath and general fatigue.  The patient is companied by his family who states that the patient has a history of aortic stenosis and he is scheduled to have a valve replacement performed next week.  They state that they are concerned as he also has a history of heart failure coronary disease and states that he has not had any chest pain but he has been come increasingly short of breath and they are concerned that he is becoming fluid overloaded.  He states he has not had a swelling in his legs and they state that he has been acting appropriate otherwise.  He states he has albuterol inhaler he has been needing as needed.  The patient at this time states that he feels okay while sitting here talking to the practitioner but states he quickly becomes short of breath with exertion.  They contacted patient's cardiologist  yesterday and were told that he could have an echocardiogram done in outpatient setting next week.  On physical exam the patient has some mild expiratory wheezing at this time and he was given his home albuterol which seemed to help resolve this.  His vitals appear appropriate this time he did not appear to be any acute distress.  The patient was ambulated with pulse ox and oxygen maintained at 95% with no difficulty.  I explained to the family that I believe that he is suffering from pneumonia and that we could be discharging him with doxycycline at this time as his vitals appeared appropriate and his labs did not reflect any serious condition at this time that would require admission.  The patient was agreeable to this plan and the patient was going to be discharged with family stated that he went to the bathroom and came back he was wheezing in the evening single dose of albuterol and stated that he was not improving.  He was given sign Medrol 125 mg IM as well as a nebulizer treatment of DuoNeb at the time and after this his wheezing completely resolved and the patient again was able to ambulate no difficulty.  I explained to the patient and to his family that bee sting home a short course of steroids.  I advised him that in the event that he has any further symptoms or any decline on pulse oxygen please have the patient return immediately.  He is discharged at this time prednisone and doxycycline to cover for his pneumonia.  Impression:   1.  Pneumonia            History provided by:  Patient   used: No                        [place] Coma Scale Score: 15                  Patient History   Past Medical History:   Diagnosis Date    CHF (congestive heart failure) (CMS/[facility])     Chronic sinusitis, unspecified 03/28/2017    Sinobronchitis    COVID-19 05/05/2022    COVID-19    COVID-19 05/05/2022    Pneumonia due to COVID-19 virus    Elevated prostate specific antigen (PSA)     Rising PSA level     Encounter for screening for malignant neoplasm, site unspecified 07/24/2018    Cancer screening    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    Diarrhea, functional    Other disorders of plasma-protein metabolism, not elsewhere classified 05/30/2017    Serum albumin decreased    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    Personal history of diseases of the skin and subcutaneous tissue 02/18/2019    History of eczema    Personal history of other diseases of male genital organs     History of BPH    Personal history of other diseases of the circulatory system     History of hypertension    Personal history of other diseases of the circulatory system     History of mitral valve insufficiency    Personal history of other diseases of the musculoskeletal system and connective tissue     Personal history of arthritis    Personal history of other endocrine, nutritional and metabolic disease     History of hyperlipidemia    Personal history of other specified conditions 11/09/2018    History of seizure    Personal history of other specified conditions 05/05/2022    History of vomiting     Past Surgical History:   Procedure Laterality Date    CARDIAC SURGERY  04/28/2014    Heart Surgery    CHOLECYSTECTOMY  04/28/2014    Cholecystectomy    GALLBLADDER SURGERY  04/28/2014    Gallbladder Surgery    MR HEAD ANGIO WO IV CONTRAST  9/30/2018    MR HEAD ANGIO WO IV CONTRAST 9/30/2018 [facility] CLINICAL LEGACY    MR NECK ANGIO WO IV CONTRAST  9/30/2018    MR NECK ANGIO WO IV CONTRAST 9/30/2018 [facility] CLINICAL LEGACY    OTHER SURGICAL HISTORY  04/28/2014    Knee Repair    OTHER SURGICAL HISTORY  04/28/2014    Needle Biopsy Of Prostate    OTHER SURGICAL HISTORY  03/05/2021    Complete colonoscopy    OTHER SURGICAL HISTORY  03/05/2021    Endoscopy    OTHER SURGICAL HISTORY  10/08/2018    Common Bile Duct Stone Removal    PROSTATE SURGERY  10/08/2018    Prostate Surgery     Family History   Problem Relation Name Age of Onset     Heart disease Mother      Breast cancer Mother      Lung cancer Father      Cancer Brother      Cancer Brother      Cancer Brother      Cancer Brother       Social History     Tobacco Use    Smoking status: Never    Smokeless tobacco: Never   Vaping Use    Vaping Use: Never used   Substance Use Topics    Alcohol use: Not Currently    Drug use: Never       Physical Exam   ED Triage Vitals [11/22/23 1014]   Temp Heart Rate Resp BP   36.4 °C (97.5 °F) 100 18 153/64      SpO2 Temp Source Heart Rate Source Patient Position   96 % Oral Monitor Lying      BP Location FiO2 (%)     Left arm --       Physical Exam    ED Course & MDM   Diagnoses as of 11/22/23 2024   Pneumonia of both lower lobes due to infectious organism       Medical Decision Making      Procedure  Procedures     Juan Hernandez PA-C  11/22/23 2030

## 2023-11-26 LAB
AORTIC VALVE MEAN GRADIENT: 33
AORTIC VALVE PEAK VELOCITY: 3.86
AV PEAK GRADIENT: 59.6
AVA (PEAK VEL): 0.9
AVA (VTI): 0.8
EJECTION FRACTION APICAL 4 CHAMBER: 48.5
EJECTION FRACTION: 50
LEFT ATRIUM VOLUME AREA LENGTH INDEX BSA: 28.1
LEFT VENTRICULAR OUTFLOW TRACT DIAMETER: 2
MITRAL VALVE E/A RATIO: 0.63
MITRAL VALVE E/E' RATIO: 14.18
RIGHT VENTRICLE FREE WALL PEAK S': 11
RIGHT VENTRICLE PEAK SYSTOLIC PRESSURE: 33
TRICUSPID ANNULAR PLANE SYSTOLIC EXCURSION: 2.5

## 2023-12-04 ENCOUNTER — HOSPITAL ENCOUNTER (OUTPATIENT)
Dept: CARDIOLOGY | Facility: HOSPITAL | Age: 88
Discharge: HOME | End: 2023-12-04
Payer: MEDICARE

## 2023-12-04 PROCEDURE — 93005 ELECTROCARDIOGRAM TRACING: CPT

## 2023-12-05 LAB
ATRIAL RATE: 88 BPM
P AXIS: 11 DEGREES
P OFFSET: 190 MS
P ONSET: 153 MS
PR INTERVAL: 130 MS
Q ONSET: 218 MS
QRS COUNT: 15 BEATS
QRS DURATION: 102 MS
QT INTERVAL: 350 MS
QTC CALCULATION(BAZETT): 423 MS
QTC FREDERICIA: 397 MS
R AXIS: -35 DEGREES
T AXIS: 60 DEGREES
T OFFSET: 393 MS
VENTRICULAR RATE: 88 BPM

## 2023-12-22 ENCOUNTER — TELEPHONE (OUTPATIENT)
Dept: PRIMARY CARE | Facility: CLINIC | Age: 88
End: 2023-12-22
Payer: MEDICARE

## 2023-12-22 ENCOUNTER — HOSPITAL ENCOUNTER (OUTPATIENT)
Facility: HOSPITAL | Age: 88
Setting detail: OBSERVATION
Discharge: HOME | End: 2023-12-24
Attending: EMERGENCY MEDICINE | Admitting: INTERNAL MEDICINE
Payer: MEDICARE

## 2023-12-22 ENCOUNTER — APPOINTMENT (OUTPATIENT)
Dept: RADIOLOGY | Facility: HOSPITAL | Age: 88
End: 2023-12-22
Payer: MEDICARE

## 2023-12-22 DIAGNOSIS — J44.1 COPD WITH ACUTE EXACERBATION (MULTI): ICD-10-CM

## 2023-12-22 DIAGNOSIS — R06.02 SHORTNESS OF BREATH: ICD-10-CM

## 2023-12-22 DIAGNOSIS — J40 BRONCHITIS: Primary | ICD-10-CM

## 2023-12-22 DIAGNOSIS — J44.1 COPD EXACERBATION (MULTI): ICD-10-CM

## 2023-12-22 LAB
ALBUMIN SERPL BCP-MCNC: 3.4 G/DL (ref 3.4–5)
ALP SERPL-CCNC: 123 U/L (ref 33–136)
ALT SERPL W P-5'-P-CCNC: 11 U/L (ref 10–52)
ANION GAP SERPL CALC-SCNC: 11 MMOL/L (ref 10–20)
AST SERPL W P-5'-P-CCNC: 14 U/L (ref 9–39)
BASOPHILS # BLD AUTO: 0.11 X10*3/UL (ref 0–0.1)
BASOPHILS NFR BLD AUTO: 1.4 %
BILIRUB SERPL-MCNC: 0.8 MG/DL (ref 0–1.2)
BNP SERPL-MCNC: 441 PG/ML (ref 0–99)
BUN SERPL-MCNC: 22 MG/DL (ref 6–23)
CALCIUM SERPL-MCNC: 8.8 MG/DL (ref 8.6–10.3)
CHLORIDE SERPL-SCNC: 97 MMOL/L (ref 98–107)
CO2 SERPL-SCNC: 28 MMOL/L (ref 21–32)
CREAT SERPL-MCNC: 1.1 MG/DL (ref 0.5–1.3)
EOSINOPHIL # BLD AUTO: 1.47 X10*3/UL (ref 0–0.4)
EOSINOPHIL NFR BLD AUTO: 18.5 %
ERYTHROCYTE [DISTWIDTH] IN BLOOD BY AUTOMATED COUNT: 13.1 % (ref 11.5–14.5)
FLUAV RNA RESP QL NAA+PROBE: NOT DETECTED
FLUBV RNA RESP QL NAA+PROBE: NOT DETECTED
GFR SERPL CREATININE-BSD FRML MDRD: 64 ML/MIN/1.73M*2
GLUCOSE SERPL-MCNC: 117 MG/DL (ref 74–99)
HCT VFR BLD AUTO: 41.2 % (ref 41–52)
HGB BLD-MCNC: 13.7 G/DL (ref 13.5–17.5)
IMM GRANULOCYTES # BLD AUTO: 0.1 X10*3/UL (ref 0–0.5)
IMM GRANULOCYTES NFR BLD AUTO: 1.3 % (ref 0–0.9)
LACTATE SERPL-SCNC: 0.9 MMOL/L (ref 0.4–2)
LYMPHOCYTES # BLD AUTO: 1.16 X10*3/UL (ref 0.8–3)
LYMPHOCYTES NFR BLD AUTO: 14.6 %
MCH RBC QN AUTO: 29.9 PG (ref 26–34)
MCHC RBC AUTO-ENTMCNC: 33.3 G/DL (ref 32–36)
MCV RBC AUTO: 90 FL (ref 80–100)
MONOCYTES # BLD AUTO: 0.67 X10*3/UL (ref 0.05–0.8)
MONOCYTES NFR BLD AUTO: 8.4 %
NEUTROPHILS # BLD AUTO: 4.45 X10*3/UL (ref 1.6–5.5)
NEUTROPHILS NFR BLD AUTO: 55.8 %
NRBC BLD-RTO: 0 /100 WBCS (ref 0–0)
PLATELET # BLD AUTO: 242 X10*3/UL (ref 150–450)
POTASSIUM SERPL-SCNC: 4.1 MMOL/L (ref 3.5–5.3)
PROT SERPL-MCNC: 5.8 G/DL (ref 6.4–8.2)
RBC # BLD AUTO: 4.58 X10*6/UL (ref 4.5–5.9)
RSV RNA RESP QL NAA+PROBE: NOT DETECTED
SARS-COV-2 RNA RESP QL NAA+PROBE: NOT DETECTED
SODIUM SERPL-SCNC: 132 MMOL/L (ref 136–145)
WBC # BLD AUTO: 8 X10*3/UL (ref 4.4–11.3)

## 2023-12-22 PROCEDURE — 80053 COMPREHEN METABOLIC PANEL: CPT | Performed by: EMERGENCY MEDICINE

## 2023-12-22 PROCEDURE — 94640 AIRWAY INHALATION TREATMENT: CPT

## 2023-12-22 PROCEDURE — 94664 DEMO&/EVAL PT USE INHALER: CPT

## 2023-12-22 PROCEDURE — 87637 SARSCOV2&INF A&B&RSV AMP PRB: CPT | Performed by: EMERGENCY MEDICINE

## 2023-12-22 PROCEDURE — 2500000002 HC RX 250 W HCPCS SELF ADMINISTERED DRUGS (ALT 637 FOR MEDICARE OP, ALT 636 FOR OP/ED)

## 2023-12-22 PROCEDURE — 2500000004 HC RX 250 GENERAL PHARMACY W/ HCPCS (ALT 636 FOR OP/ED): Performed by: EMERGENCY MEDICINE

## 2023-12-22 PROCEDURE — 87040 BLOOD CULTURE FOR BACTERIA: CPT | Mod: GENLAB | Performed by: EMERGENCY MEDICINE

## 2023-12-22 PROCEDURE — 83880 ASSAY OF NATRIURETIC PEPTIDE: CPT | Performed by: EMERGENCY MEDICINE

## 2023-12-22 PROCEDURE — 2500000002 HC RX 250 W HCPCS SELF ADMINISTERED DRUGS (ALT 637 FOR MEDICARE OP, ALT 636 FOR OP/ED): Performed by: EMERGENCY MEDICINE

## 2023-12-22 PROCEDURE — 85025 COMPLETE CBC W/AUTO DIFF WBC: CPT | Performed by: EMERGENCY MEDICINE

## 2023-12-22 PROCEDURE — 9420000001 HC RT PATIENT EDUCATION 5 MIN

## 2023-12-22 PROCEDURE — 96365 THER/PROPH/DIAG IV INF INIT: CPT | Performed by: INTERNAL MEDICINE

## 2023-12-22 PROCEDURE — 96375 TX/PRO/DX INJ NEW DRUG ADDON: CPT | Performed by: INTERNAL MEDICINE

## 2023-12-22 PROCEDURE — 71250 CT THORAX DX C-: CPT

## 2023-12-22 PROCEDURE — 2500000002 HC RX 250 W HCPCS SELF ADMINISTERED DRUGS (ALT 637 FOR MEDICARE OP, ALT 636 FOR OP/ED): Performed by: NURSE PRACTITIONER

## 2023-12-22 PROCEDURE — 2500000001 HC RX 250 WO HCPCS SELF ADMINISTERED DRUGS (ALT 637 FOR MEDICARE OP): Performed by: NURSE PRACTITIONER

## 2023-12-22 PROCEDURE — 96374 THER/PROPH/DIAG INJ IV PUSH: CPT | Mod: 59 | Performed by: INTERNAL MEDICINE

## 2023-12-22 PROCEDURE — 2500000004 HC RX 250 GENERAL PHARMACY W/ HCPCS (ALT 636 FOR OP/ED): Performed by: NURSE PRACTITIONER

## 2023-12-22 PROCEDURE — 36415 COLL VENOUS BLD VENIPUNCTURE: CPT | Performed by: EMERGENCY MEDICINE

## 2023-12-22 PROCEDURE — 99285 EMERGENCY DEPT VISIT HI MDM: CPT | Performed by: EMERGENCY MEDICINE

## 2023-12-22 PROCEDURE — G0378 HOSPITAL OBSERVATION PER HR: HCPCS

## 2023-12-22 PROCEDURE — 71250 CT THORAX DX C-: CPT | Performed by: RADIOLOGY

## 2023-12-22 PROCEDURE — 83605 ASSAY OF LACTIC ACID: CPT | Performed by: EMERGENCY MEDICINE

## 2023-12-22 RX ORDER — GUAIFENESIN 600 MG/1
600 TABLET, EXTENDED RELEASE ORAL EVERY 12 HOURS PRN
Status: DISCONTINUED | OUTPATIENT
Start: 2023-12-22 | End: 2023-12-24 | Stop reason: HOSPADM

## 2023-12-22 RX ORDER — LISINOPRIL 10 MG/1
10 TABLET ORAL 2 TIMES DAILY
Status: DISCONTINUED | OUTPATIENT
Start: 2023-12-22 | End: 2023-12-24 | Stop reason: HOSPADM

## 2023-12-22 RX ORDER — IPRATROPIUM BROMIDE AND ALBUTEROL SULFATE 2.5; .5 MG/3ML; MG/3ML
3 SOLUTION RESPIRATORY (INHALATION)
Status: DISCONTINUED | OUTPATIENT
Start: 2023-12-22 | End: 2023-12-22

## 2023-12-22 RX ORDER — PANTOPRAZOLE SODIUM 40 MG/1
40 TABLET, DELAYED RELEASE ORAL
Status: DISCONTINUED | OUTPATIENT
Start: 2023-12-23 | End: 2023-12-23

## 2023-12-22 RX ORDER — ALBUTEROL SULFATE 0.83 MG/ML
2.5 SOLUTION RESPIRATORY (INHALATION) ONCE
Status: COMPLETED | OUTPATIENT
Start: 2023-12-22 | End: 2023-12-22

## 2023-12-22 RX ORDER — IPRATROPIUM BROMIDE AND ALBUTEROL SULFATE 2.5; .5 MG/3ML; MG/3ML
6 SOLUTION RESPIRATORY (INHALATION) ONCE
Status: COMPLETED | OUTPATIENT
Start: 2023-12-22 | End: 2023-12-22

## 2023-12-22 RX ORDER — ONDANSETRON 4 MG/1
4 TABLET, FILM COATED ORAL EVERY 8 HOURS PRN
Status: DISCONTINUED | OUTPATIENT
Start: 2023-12-22 | End: 2023-12-24 | Stop reason: HOSPADM

## 2023-12-22 RX ORDER — PANTOPRAZOLE SODIUM 40 MG/10ML
40 INJECTION, POWDER, LYOPHILIZED, FOR SOLUTION INTRAVENOUS
Status: DISCONTINUED | OUTPATIENT
Start: 2023-12-23 | End: 2023-12-23

## 2023-12-22 RX ORDER — DEXAMETHASONE SODIUM PHOSPHATE 10 MG/ML
10 INJECTION INTRAMUSCULAR; INTRAVENOUS ONCE
Status: COMPLETED | OUTPATIENT
Start: 2023-12-22 | End: 2023-12-22

## 2023-12-22 RX ORDER — POTASSIUM CHLORIDE 20 MEQ/1
20 TABLET, EXTENDED RELEASE ORAL
Status: DISCONTINUED | OUTPATIENT
Start: 2023-12-23 | End: 2023-12-24 | Stop reason: HOSPADM

## 2023-12-22 RX ORDER — POLYETHYLENE GLYCOL 3350 17 G/17G
17 POWDER, FOR SOLUTION ORAL DAILY
Status: DISCONTINUED | OUTPATIENT
Start: 2023-12-22 | End: 2023-12-24 | Stop reason: HOSPADM

## 2023-12-22 RX ORDER — FUROSEMIDE 40 MG/1
40 TABLET ORAL
Status: DISCONTINUED | OUTPATIENT
Start: 2023-12-23 | End: 2023-12-24 | Stop reason: HOSPADM

## 2023-12-22 RX ORDER — ONDANSETRON HYDROCHLORIDE 2 MG/ML
4 INJECTION, SOLUTION INTRAVENOUS EVERY 8 HOURS PRN
Status: DISCONTINUED | OUTPATIENT
Start: 2023-12-22 | End: 2023-12-24 | Stop reason: HOSPADM

## 2023-12-22 RX ORDER — ENOXAPARIN SODIUM 100 MG/ML
40 INJECTION SUBCUTANEOUS EVERY 24 HOURS
Status: DISCONTINUED | OUTPATIENT
Start: 2023-12-22 | End: 2023-12-24 | Stop reason: HOSPADM

## 2023-12-22 RX ORDER — GUAIFENESIN/DEXTROMETHORPHAN 100-10MG/5
5 SYRUP ORAL EVERY 4 HOURS PRN
Status: DISCONTINUED | OUTPATIENT
Start: 2023-12-22 | End: 2023-12-24 | Stop reason: HOSPADM

## 2023-12-22 RX ORDER — ACETAMINOPHEN 650 MG/1
650 SUPPOSITORY RECTAL EVERY 4 HOURS PRN
Status: DISCONTINUED | OUTPATIENT
Start: 2023-12-22 | End: 2023-12-24 | Stop reason: HOSPADM

## 2023-12-22 RX ORDER — ACETAMINOPHEN 325 MG/1
650 TABLET ORAL EVERY 4 HOURS PRN
Status: DISCONTINUED | OUTPATIENT
Start: 2023-12-22 | End: 2023-12-24 | Stop reason: HOSPADM

## 2023-12-22 RX ORDER — IPRATROPIUM BROMIDE AND ALBUTEROL SULFATE 2.5; .5 MG/3ML; MG/3ML
SOLUTION RESPIRATORY (INHALATION)
Status: COMPLETED
Start: 2023-12-22 | End: 2023-12-22

## 2023-12-22 RX ORDER — ALBUTEROL SULFATE 0.83 MG/ML
2.5 SOLUTION RESPIRATORY (INHALATION) EVERY 2 HOUR PRN
Status: DISCONTINUED | OUTPATIENT
Start: 2023-12-22 | End: 2023-12-24 | Stop reason: HOSPADM

## 2023-12-22 RX ORDER — IPRATROPIUM BROMIDE AND ALBUTEROL SULFATE 2.5; .5 MG/3ML; MG/3ML
3 SOLUTION RESPIRATORY (INHALATION) 3 TIMES DAILY
Status: DISCONTINUED | OUTPATIENT
Start: 2023-12-23 | End: 2023-12-24 | Stop reason: HOSPADM

## 2023-12-22 RX ORDER — IPRATROPIUM BROMIDE AND ALBUTEROL SULFATE 2.5; .5 MG/3ML; MG/3ML
3 SOLUTION RESPIRATORY (INHALATION) 4 TIMES DAILY PRN
Status: DISCONTINUED | OUTPATIENT
Start: 2023-12-22 | End: 2023-12-22

## 2023-12-22 RX ORDER — ACETAMINOPHEN 160 MG/5ML
650 SOLUTION ORAL EVERY 4 HOURS PRN
Status: DISCONTINUED | OUTPATIENT
Start: 2023-12-22 | End: 2023-12-24 | Stop reason: HOSPADM

## 2023-12-22 RX ORDER — CARVEDILOL 6.25 MG/1
12.5 TABLET ORAL
Status: DISCONTINUED | OUTPATIENT
Start: 2023-12-23 | End: 2023-12-24 | Stop reason: HOSPADM

## 2023-12-22 RX ORDER — TALC
3 POWDER (GRAM) TOPICAL DAILY
Status: DISCONTINUED | OUTPATIENT
Start: 2023-12-22 | End: 2023-12-24 | Stop reason: HOSPADM

## 2023-12-22 RX ADMIN — IPRATROPIUM BROMIDE AND ALBUTEROL SULFATE 3 ML: .5; 3 SOLUTION RESPIRATORY (INHALATION) at 21:00

## 2023-12-22 RX ADMIN — AZITHROMYCIN MONOHYDRATE 500 MG: 500 INJECTION, POWDER, LYOPHILIZED, FOR SOLUTION INTRAVENOUS at 19:44

## 2023-12-22 RX ADMIN — IPRATROPIUM BROMIDE AND ALBUTEROL SULFATE 6 ML: .5; 3 SOLUTION RESPIRATORY (INHALATION) at 17:10

## 2023-12-22 RX ADMIN — LISINOPRIL 10 MG: 10 TABLET ORAL at 23:15

## 2023-12-22 RX ADMIN — IPRATROPIUM BROMIDE AND ALBUTEROL SULFATE 6 ML: 2.5; .5 SOLUTION RESPIRATORY (INHALATION) at 17:10

## 2023-12-22 RX ADMIN — ALBUTEROL SULFATE 2.5 MG: 2.5 SOLUTION RESPIRATORY (INHALATION) at 17:10

## 2023-12-22 RX ADMIN — METHYLPREDNISOLONE SODIUM SUCCINATE 40 MG: 40 INJECTION, POWDER, FOR SOLUTION INTRAMUSCULAR; INTRAVENOUS at 23:32

## 2023-12-22 RX ADMIN — DEXAMETHASONE SODIUM PHOSPHATE 10 MG: 10 INJECTION, SOLUTION INTRAMUSCULAR; INTRAVENOUS at 19:44

## 2023-12-22 SDOH — SOCIAL STABILITY: SOCIAL INSECURITY: ARE YOU OR HAVE YOU BEEN THREATENED OR ABUSED PHYSICALLY, EMOTIONALLY, OR SEXUALLY BY ANYONE?: NO

## 2023-12-22 SDOH — SOCIAL STABILITY: SOCIAL INSECURITY: ARE THERE ANY APPARENT SIGNS OF INJURIES/BEHAVIORS THAT COULD BE RELATED TO ABUSE/NEGLECT?: NO

## 2023-12-22 SDOH — SOCIAL STABILITY: SOCIAL INSECURITY: ABUSE: ADULT

## 2023-12-22 SDOH — SOCIAL STABILITY: SOCIAL INSECURITY: HAS ANYONE EVER THREATENED TO HURT YOUR FAMILY OR YOUR PETS?: NO

## 2023-12-22 SDOH — SOCIAL STABILITY: SOCIAL INSECURITY: DOES ANYONE TRY TO KEEP YOU FROM HAVING/CONTACTING OTHER FRIENDS OR DOING THINGS OUTSIDE YOUR HOME?: NO

## 2023-12-22 SDOH — SOCIAL STABILITY: SOCIAL INSECURITY: DO YOU FEEL ANYONE HAS EXPLOITED OR TAKEN ADVANTAGE OF YOU FINANCIALLY OR OF YOUR PERSONAL PROPERTY?: NO

## 2023-12-22 SDOH — SOCIAL STABILITY: SOCIAL INSECURITY: DO YOU FEEL UNSAFE GOING BACK TO THE PLACE WHERE YOU ARE LIVING?: NO

## 2023-12-22 SDOH — SOCIAL STABILITY: SOCIAL INSECURITY: WERE YOU ABLE TO COMPLETE ALL THE BEHAVIORAL HEALTH SCREENINGS?: YES

## 2023-12-22 SDOH — SOCIAL STABILITY: SOCIAL INSECURITY: HAVE YOU HAD THOUGHTS OF HARMING ANYONE ELSE?: NO

## 2023-12-22 ASSESSMENT — ACTIVITIES OF DAILY LIVING (ADL)
LACK_OF_TRANSPORTATION: NO
BATHING: INDEPENDENT
DRESSING YOURSELF: INDEPENDENT
HEARING - RIGHT EAR: DIFFICULTY WITH NOISE
ADEQUATE_TO_COMPLETE_ADL: YES
HEARING - LEFT EAR: DIFFICULTY WITH NOISE
GROOMING: INDEPENDENT
WALKS IN HOME: INDEPENDENT
FEEDING YOURSELF: INDEPENDENT
ASSISTIVE_DEVICE: EYEGLASSES
TOILETING: INDEPENDENT
PATIENT'S MEMORY ADEQUATE TO SAFELY COMPLETE DAILY ACTIVITIES?: YES
JUDGMENT_ADEQUATE_SAFELY_COMPLETE_DAILY_ACTIVITIES: YES

## 2023-12-22 ASSESSMENT — LIFESTYLE VARIABLES
SKIP TO QUESTIONS 9-10: 1
AUDIT-C TOTAL SCORE: 0
AUDIT-C TOTAL SCORE: 0
HOW OFTEN DO YOU HAVE 6 OR MORE DRINKS ON ONE OCCASION: NEVER
SUBSTANCE_ABUSE_PAST_12_MONTHS: NO
PRESCIPTION_ABUSE_PAST_12_MONTHS: NO
HOW MANY STANDARD DRINKS CONTAINING ALCOHOL DO YOU HAVE ON A TYPICAL DAY: PATIENT DOES NOT DRINK
HOW OFTEN DO YOU HAVE A DRINK CONTAINING ALCOHOL: NEVER

## 2023-12-22 ASSESSMENT — COGNITIVE AND FUNCTIONAL STATUS - GENERAL
PATIENT BASELINE BEDBOUND: NO
MOBILITY SCORE: 24
DAILY ACTIVITIY SCORE: 24

## 2023-12-22 ASSESSMENT — PAIN SCALES - GENERAL
PAINLEVEL_OUTOF10: 0 - NO PAIN
PAINLEVEL_OUTOF10: 0 - NO PAIN

## 2023-12-22 ASSESSMENT — COLUMBIA-SUICIDE SEVERITY RATING SCALE - C-SSRS
1. IN THE PAST MONTH, HAVE YOU WISHED YOU WERE DEAD OR WISHED YOU COULD GO TO SLEEP AND NOT WAKE UP?: NO
2. HAVE YOU ACTUALLY HAD ANY THOUGHTS OF KILLING YOURSELF?: NO
6. HAVE YOU EVER DONE ANYTHING, STARTED TO DO ANYTHING, OR PREPARED TO DO ANYTHING TO END YOUR LIFE?: NO

## 2023-12-22 ASSESSMENT — PATIENT HEALTH QUESTIONNAIRE - PHQ9
SUM OF ALL RESPONSES TO PHQ9 QUESTIONS 1 & 2: 0
2. FEELING DOWN, DEPRESSED OR HOPELESS: NOT AT ALL
1. LITTLE INTEREST OR PLEASURE IN DOING THINGS: NOT AT ALL

## 2023-12-22 ASSESSMENT — PAIN - FUNCTIONAL ASSESSMENT: PAIN_FUNCTIONAL_ASSESSMENT: 0-10

## 2023-12-22 NOTE — TELEPHONE ENCOUNTER
Daughter, Wiliam, (HIPAA) called stating that her mom called her concerned patient may have pneumonia again. Requesting that chest xray be ordered. He was in the ER on 11/22/23 for pneumonia. Currently experiencing coughing.

## 2023-12-23 LAB
ANION GAP SERPL CALC-SCNC: 12 MMOL/L (ref 10–20)
BUN SERPL-MCNC: 18 MG/DL (ref 6–23)
CALCIUM SERPL-MCNC: 8.5 MG/DL (ref 8.6–10.3)
CHLORIDE SERPL-SCNC: 97 MMOL/L (ref 98–107)
CO2 SERPL-SCNC: 24 MMOL/L (ref 21–32)
CREAT SERPL-MCNC: 0.95 MG/DL (ref 0.5–1.3)
ERYTHROCYTE [DISTWIDTH] IN BLOOD BY AUTOMATED COUNT: 12.7 % (ref 11.5–14.5)
GFR SERPL CREATININE-BSD FRML MDRD: 76 ML/MIN/1.73M*2
GLUCOSE SERPL-MCNC: 148 MG/DL (ref 74–99)
HCT VFR BLD AUTO: 40.8 % (ref 41–52)
HGB BLD-MCNC: 13.7 G/DL (ref 13.5–17.5)
MCH RBC QN AUTO: 30 PG (ref 26–34)
MCHC RBC AUTO-ENTMCNC: 33.6 G/DL (ref 32–36)
MCV RBC AUTO: 90 FL (ref 80–100)
NRBC BLD-RTO: 0 /100 WBCS (ref 0–0)
PLATELET # BLD AUTO: 231 X10*3/UL (ref 150–450)
POTASSIUM SERPL-SCNC: 4.1 MMOL/L (ref 3.5–5.3)
RBC # BLD AUTO: 4.56 X10*6/UL (ref 4.5–5.9)
SODIUM SERPL-SCNC: 129 MMOL/L (ref 136–145)
WBC # BLD AUTO: 6.4 X10*3/UL (ref 4.4–11.3)

## 2023-12-23 PROCEDURE — 36415 COLL VENOUS BLD VENIPUNCTURE: CPT | Performed by: NURSE PRACTITIONER

## 2023-12-23 PROCEDURE — 2500000004 HC RX 250 GENERAL PHARMACY W/ HCPCS (ALT 636 FOR OP/ED)

## 2023-12-23 PROCEDURE — 2500000002 HC RX 250 W HCPCS SELF ADMINISTERED DRUGS (ALT 637 FOR MEDICARE OP, ALT 636 FOR OP/ED): Performed by: NURSE PRACTITIONER

## 2023-12-23 PROCEDURE — 96366 THER/PROPH/DIAG IV INF ADDON: CPT | Performed by: INTERNAL MEDICINE

## 2023-12-23 PROCEDURE — G0378 HOSPITAL OBSERVATION PER HR: HCPCS

## 2023-12-23 PROCEDURE — 94640 AIRWAY INHALATION TREATMENT: CPT

## 2023-12-23 PROCEDURE — 2500000004 HC RX 250 GENERAL PHARMACY W/ HCPCS (ALT 636 FOR OP/ED): Mod: MUE | Performed by: NURSE PRACTITIONER

## 2023-12-23 PROCEDURE — 84145 PROCALCITONIN (PCT): CPT | Mod: GENLAB | Performed by: NURSE PRACTITIONER

## 2023-12-23 PROCEDURE — 2500000001 HC RX 250 WO HCPCS SELF ADMINISTERED DRUGS (ALT 637 FOR MEDICARE OP): Performed by: NURSE PRACTITIONER

## 2023-12-23 PROCEDURE — 96376 TX/PRO/DX INJ SAME DRUG ADON: CPT | Performed by: INTERNAL MEDICINE

## 2023-12-23 PROCEDURE — 85027 COMPLETE CBC AUTOMATED: CPT | Performed by: NURSE PRACTITIONER

## 2023-12-23 PROCEDURE — 94668 MNPJ CHEST WALL SBSQ: CPT

## 2023-12-23 PROCEDURE — 82374 ASSAY BLOOD CARBON DIOXIDE: CPT | Performed by: NURSE PRACTITIONER

## 2023-12-23 PROCEDURE — 94667 MNPJ CHEST WALL 1ST: CPT

## 2023-12-23 PROCEDURE — 94760 N-INVAS EAR/PLS OXIMETRY 1: CPT

## 2023-12-23 PROCEDURE — 99223 1ST HOSP IP/OBS HIGH 75: CPT

## 2023-12-23 PROCEDURE — 2500000004 HC RX 250 GENERAL PHARMACY W/ HCPCS (ALT 636 FOR OP/ED): Performed by: INTERNAL MEDICINE

## 2023-12-23 RX ORDER — GUAIFENESIN 600 MG/1
600 TABLET, EXTENDED RELEASE ORAL 2 TIMES DAILY
Status: DISCONTINUED | OUTPATIENT
Start: 2023-12-23 | End: 2023-12-24 | Stop reason: HOSPADM

## 2023-12-23 RX ORDER — PANTOPRAZOLE SODIUM 40 MG/10ML
40 INJECTION, POWDER, LYOPHILIZED, FOR SOLUTION INTRAVENOUS
Status: DISCONTINUED | OUTPATIENT
Start: 2023-12-23 | End: 2023-12-24 | Stop reason: HOSPADM

## 2023-12-23 RX ORDER — PANTOPRAZOLE SODIUM 40 MG/1
40 TABLET, DELAYED RELEASE ORAL
Status: DISCONTINUED | OUTPATIENT
Start: 2023-12-23 | End: 2023-12-24 | Stop reason: HOSPADM

## 2023-12-23 RX ADMIN — METHYLPREDNISOLONE SODIUM SUCCINATE 40 MG: 40 INJECTION, POWDER, FOR SOLUTION INTRAMUSCULAR; INTRAVENOUS at 06:30

## 2023-12-23 RX ADMIN — PANTOPRAZOLE SODIUM 40 MG: 40 TABLET, DELAYED RELEASE ORAL at 06:30

## 2023-12-23 RX ADMIN — LISINOPRIL 10 MG: 10 TABLET ORAL at 08:58

## 2023-12-23 RX ADMIN — CARVEDILOL 12.5 MG: 6.25 TABLET, FILM COATED ORAL at 22:13

## 2023-12-23 RX ADMIN — METHYLPREDNISOLONE SODIUM SUCCINATE 40 MG: 40 INJECTION, POWDER, FOR SOLUTION INTRAMUSCULAR; INTRAVENOUS at 15:30

## 2023-12-23 RX ADMIN — AZITHROMYCIN 500 MG: 500 INJECTION, POWDER, LYOPHILIZED, FOR SOLUTION INTRAVENOUS at 17:29

## 2023-12-23 RX ADMIN — POTASSIUM CHLORIDE 20 MEQ: 1500 TABLET, EXTENDED RELEASE ORAL at 08:58

## 2023-12-23 RX ADMIN — GUAIFENESIN 600 MG: 600 TABLET, EXTENDED RELEASE ORAL at 22:13

## 2023-12-23 RX ADMIN — LISINOPRIL 10 MG: 10 TABLET ORAL at 22:13

## 2023-12-23 RX ADMIN — IPRATROPIUM BROMIDE AND ALBUTEROL SULFATE 3 ML: .5; 3 SOLUTION RESPIRATORY (INHALATION) at 09:14

## 2023-12-23 RX ADMIN — FUROSEMIDE 40 MG: 40 TABLET ORAL at 08:58

## 2023-12-23 RX ADMIN — IPRATROPIUM BROMIDE AND ALBUTEROL SULFATE 3 ML: .5; 3 SOLUTION RESPIRATORY (INHALATION) at 21:02

## 2023-12-23 RX ADMIN — GUAIFENESIN 600 MG: 600 TABLET, EXTENDED RELEASE ORAL at 08:58

## 2023-12-23 RX ADMIN — IPRATROPIUM BROMIDE AND ALBUTEROL SULFATE 3 ML: .5; 3 SOLUTION RESPIRATORY (INHALATION) at 14:48

## 2023-12-23 RX ADMIN — CARVEDILOL 12.5 MG: 6.25 TABLET, FILM COATED ORAL at 08:58

## 2023-12-23 ASSESSMENT — ENCOUNTER SYMPTOMS
NEUROLOGICAL NEGATIVE: 1
PSYCHIATRIC NEGATIVE: 1
WHEEZING: 1
CONSTITUTIONAL NEGATIVE: 1
HEMATOLOGIC/LYMPHATIC NEGATIVE: 1
COUGH: 1
MUSCULOSKELETAL NEGATIVE: 1
ALLERGIC/IMMUNOLOGIC NEGATIVE: 1
SHORTNESS OF BREATH: 1
CARDIOVASCULAR NEGATIVE: 1
GASTROINTESTINAL NEGATIVE: 1
ENDOCRINE NEGATIVE: 1
EYES NEGATIVE: 1

## 2023-12-23 ASSESSMENT — COGNITIVE AND FUNCTIONAL STATUS - GENERAL
DAILY ACTIVITIY SCORE: 22
WALKING IN HOSPITAL ROOM: A LITTLE
MOBILITY SCORE: 21
MOVING TO AND FROM BED TO CHAIR: A LITTLE
MOBILITY SCORE: 24
CLIMB 3 TO 5 STEPS WITH RAILING: A LITTLE
DAILY ACTIVITIY SCORE: 24
HELP NEEDED FOR BATHING: A LITTLE
TOILETING: A LITTLE

## 2023-12-23 ASSESSMENT — PAIN SCALES - GENERAL
PAINLEVEL_OUTOF10: 0 - NO PAIN
PAINLEVEL_OUTOF10: 0 - NO PAIN

## 2023-12-23 NOTE — ED PROVIDER NOTES
HPI   Chief Complaint   Patient presents with    Shortness of Breath     Patient has been having a hard time breathing and experiencing a lot of wheezing and weakness. 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    Diarrhea, functional    Other disorders of plasma-protein metabolism, not elsewhere classified 05/30/2017    Serum albumin decreased    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    Personal history of diseases of the skin and subcutaneous tissue 02/18/2019    History of eczema    Personal history of other diseases of male genital organs     History of BPH    Personal history of other diseases of the circulatory system     History of hypertension    Personal history of other diseases of the circulatory system     History of mitral valve insufficiency    Personal history of other diseases of the musculoskeletal system and connective tissue     Personal history of arthritis    Personal history of other endocrine, nutritional and metabolic disease     History of hyperlipidemia    Personal history of other specified conditions 11/09/2018    History of seizure    Personal history of other specified conditions 05/05/2022    History of vomiting     Past Surgical History:   Procedure Laterality Date    CARDIAC SURGERY  04/28/2014    Heart Surgery    CHOLECYSTECTOMY  04/28/2014    Cholecystectomy    GALLBLADDER SURGERY  04/28/2014    Gallbladder Surgery    MR HEAD ANGIO WO IV CONTRAST  9/30/2018    MR HEAD  ANGIO WO IV CONTRAST 9/30/2018 [facility] CLINICAL LEGACY    MR NECK ANGIO WO IV CONTRAST  9/30/2018    MR NECK ANGIO WO IV CONTRAST 9/30/2018 [facility] CLINICAL LEGACY    OTHER SURGICAL HISTORY  04/28/2014    Knee Repair    OTHER SURGICAL HISTORY  04/28/2014    Needle Biopsy Of Prostate    OTHER SURGICAL HISTORY  03/05/2021    Complete colonoscopy    OTHER SURGICAL HISTORY  03/05/2021    Endoscopy    OTHER SURGICAL HISTORY  10/08/2018    Common Bile Duct Stone Removal    PROSTATE SURGERY  10/08/2018    Prostate Surgery     Family History   Problem Relation Name Age of Onset    Heart disease Mother      Breast cancer Mother      Lung cancer Father      Cancer Brother      Cancer Brother      Cancer Brother      Cancer Brother       Social History     Tobacco Use    Smoking status: Never    Smokeless tobacco: Never   Vaping Use    Vaping Use: Never used   Substance Use Topics    Alcohol use: Not Currently    Drug use: Never       Physical Exam   ED Triage Vitals [12/22/23 1704]   Temp Heart Rate Resp BP   37.1 °C (98.7 °F) 89 (!) 30 (!) 168/103      SpO2 Temp Source Heart Rate Source Patient Position   95 % Oral Monitor Sitting      BP Location FiO2 (%)     Left arm --       Physical Exam  Vitals and nursing note reviewed.   Constitutional:       General: He is not in acute distress.     Appearance: He is well-developed.   HENT:      Head: Normocephalic and atraumatic.   Eyes:      Conjunctiva/sclera: Conjunctivae normal.   Cardiovascular:      Rate and Rhythm: Normal rate and regular rhythm.      Heart sounds: No murmur heard.  Pulmonary:      Effort: Pulmonary effort is normal. No respiratory distress.      Breath sounds: Wheezing present.   Abdominal:      Palpations: Abdomen is soft.      Tenderness: There is no abdominal tenderness.   Musculoskeletal:         General: No swelling.      Cervical back: Neck supple.      Right lower leg: Edema present.      Left lower leg: Edema present.   Skin:     General: Skin is warm and  dry.      Capillary Refill: Capillary refill takes less than 2 seconds.   Neurological:      Mental Status: He is alert.   Psychiatric:         Mood and Affect: Mood normal.         ED Course & MDM   Diagnoses as of 12/22/23 1921   Bronchitis   Shortness of breath   COPD with acute exacerbation (CMS/[facility])       Medical Decision Making  Very pleasant 89-year-old gentleman presents to the ER with chief complaint of shortness of breath and wheezing.  Patient reports that he had pneumonia few months ago however he has been having increased wheezing the past couple days normally he uses inhaler/nebulizer and improved with this time today it was not getting better for this reason they came to the ED.  Patient reports he does not have history of COPD but again he does have a routine use of a nebulizer.  Patient was given multiple breathing treatments here in the ED patient slightly improved but still having some wheezing.  Due to his age plan is to observe him overnight with continuous nebs and patient was given antibiotics and steroids.  Discussed with inpatient team for observation overnight the patient understands the plan also discussed with the wife.        Procedure  Procedures     Edmundo Quach DO  12/22/23 1921

## 2023-12-23 NOTE — CARE PLAN
The patient's goals for the shift include      The clinical goals for the shift include Maintain SpO2 >92% on RA throughout this shift

## 2023-12-23 NOTE — H&P
History Of Present Illness  Radhames Ken is a 89 y.o. male presenting with cough. Pt and spouse report that pt was treated for pneumonia a month ago. He has now had a productive cough with shortness of breath and wheezing that has progressively worsened over the last week or so. Pt denies any chest pain, palpitations, or other associated symptoms. Pt is stable on RA, however, very wheezy on exam.     ED VS: T37.1, HR 89, RR 30, /103, Sp02 95%RA    Imaging: CT chest- Improving airspace disease in the lower lobes bilaterally with  residual streaky and ground-glass opacities in the lung bases likely  postinfectious sequelae.  Scattered debris/mucous plugging throughout the small airways  involving all 5 lobes, greatest in the lung bases.  Mild thoracic aortic aneurysm.  Small hiatal hernia. Fluid reflux throughout the mid and distal  esophagus.  Subtle ground-glass opacities in the lung apices, nonspecific  pneumonitis.    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    Diarrhea, functional    Other disorders of plasma-protein metabolism, not elsewhere classified 05/30/2017    Serum albumin decreased    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    Personal history of diseases of the skin and subcutaneous tissue 02/18/2019    History of eczema    Personal history of other diseases of male genital organs     History of BPH    Personal history of other  diseases of the circulatory system     History of hypertension    Personal history of other diseases of the circulatory system     History of mitral valve insufficiency    Personal history of other diseases of the musculoskeletal system and connective tissue     Personal history of arthritis    Personal history of other endocrine, nutritional and metabolic disease     History of hyperlipidemia    Personal history of other specified conditions 11/09/2018    History of seizure    Personal history of other specified conditions 05/05/2022    History of vomiting       Surgical History  Past Surgical History:   Procedure Laterality Date    CARDIAC SURGERY  04/28/2014    Heart Surgery    CHOLECYSTECTOMY  04/28/2014    Cholecystectomy    GALLBLADDER SURGERY  04/28/2014    Gallbladder Surgery    MR HEAD ANGIO WO IV CONTRAST  9/30/2018    MR HEAD ANGIO WO IV CONTRAST 9/30/2018 [facility] CLINICAL LEGACY    MR NECK ANGIO WO IV CONTRAST  9/30/2018    MR NECK ANGIO WO IV CONTRAST 9/30/2018 [facility] CLINICAL LEGACY    OTHER SURGICAL HISTORY  04/28/2014    Knee Repair    OTHER SURGICAL HISTORY  04/28/2014    Needle Biopsy Of Prostate    OTHER SURGICAL HISTORY  03/05/2021    Complete colonoscopy    OTHER SURGICAL HISTORY  03/05/2021    Endoscopy    OTHER SURGICAL HISTORY  10/08/2018    Common Bile Duct Stone Removal    PROSTATE SURGERY  10/08/2018    Prostate Surgery        Social History  He reports that he has never smoked. He has never used smokeless tobacco. He reports that he does not currently use alcohol. He reports that he does not use drugs.    Family History  Family History   Problem Relation Name Age of Onset    Heart disease Mother      Breast cancer Mother      Lung cancer Father      Cancer Brother      Cancer Brother      Cancer Brother      Cancer Brother          Allergies  Patient has no known allergies.    Review of Systems   Constitutional: Negative.    HENT: Negative.     Eyes: Negative.    Respiratory:  Positive for  "cough, shortness of breath and wheezing.    Cardiovascular: Negative.    Gastrointestinal: Negative.    Endocrine: Negative.    Genitourinary: Negative.    Musculoskeletal: Negative.    Allergic/Immunologic: Negative.    Neurological: Negative.    Hematological: Negative.    Psychiatric/Behavioral: Negative.          Physical Exam  HENT:      Head: Normocephalic.      Nose: Nose normal.      Mouth/Throat:      Pharynx: Oropharynx is clear.   Eyes:      Conjunctiva/sclera: Conjunctivae normal.   Cardiovascular:      Rate and Rhythm: Normal rate and regular rhythm.   Pulmonary:      Breath sounds: Wheezing and rhonchi present.   Abdominal:      General: Bowel sounds are normal.      Palpations: Abdomen is soft.   Musculoskeletal:         General: Normal range of motion.      Cervical back: Normal range of motion and neck supple.   Skin:     General: Skin is dry.   Neurological:      Mental Status: He is alert and oriented to person, place, and time.   Psychiatric:         Mood and Affect: Mood normal.         Behavior: Behavior normal.          Last Recorded Vitals  Blood pressure 127/75, pulse 84, temperature 36.7 °C (98.1 °F), temperature source Temporal, resp. rate 20, height 1.803 m (5' 11\"), weight 73.4 kg (161 lb 13.1 oz), SpO2 93 %.    Relevant Results  Scheduled medications  azithromycin, 500 mg, intravenous, Daily  carvedilol, 12.5 mg, oral, BID with meals  enoxaparin, 40 mg, subcutaneous, q24h  furosemide, 40 mg, oral, Daily  guaiFENesin, 600 mg, oral, BID  ipratropium-albuteroL, 3 mL, nebulization, TID  lisinopril, 10 mg, oral, BID  melatonin, 3 mg, oral, Daily  methylPREDNISolone sodium succinate (PF), 40 mg, intravenous, q8h  pantoprazole, 40 mg, oral, Daily before breakfast   Or  pantoprazole, 40 mg, intravenous, Daily before breakfast  polyethylene glycol, 17 g, oral, Daily  potassium chloride CR, 20 mEq, oral, Daily      Continuous medications     PRN medications  PRN medications: acetaminophen **OR** " acetaminophen **OR** acetaminophen, acetaminophen **OR** acetaminophen **OR** acetaminophen, albuterol, benzocaine-menthol, dextromethorphan-guaifenesin, guaiFENesin, ondansetron **OR** ondansetron    Results for orders placed or performed during the hospital encounter of 12/22/23 (from the past 24 hour(s))   CBC and Auto Differential   Result Value Ref Range    WBC 8.0 4.4 - 11.3 x10*3/uL    nRBC 0.0 0.0 - 0.0 /100 WBCs    RBC 4.58 4.50 - 5.90 x10*6/uL    Hemoglobin 13.7 13.5 - 17.5 g/dL    Hematocrit 41.2 41.0 - 52.0 %    MCV 90 80 - 100 fL    MCH 29.9 26.0 - 34.0 pg    MCHC 33.3 32.0 - 36.0 g/dL    RDW 13.1 11.5 - 14.5 %    Platelets 242 150 - 450 x10*3/uL    Neutrophils % 55.8 40.0 - 80.0 %    Immature Granulocytes %, Automated 1.3 (H) 0.0 - 0.9 %    Lymphocytes % 14.6 13.0 - 44.0 %    Monocytes % 8.4 2.0 - 10.0 %    Eosinophils % 18.5 0.0 - 6.0 %    Basophils % 1.4 0.0 - 2.0 %    Neutrophils Absolute 4.45 1.60 - 5.50 x10*3/uL    Immature Granulocytes Absolute, Automated 0.10 0.00 - 0.50 x10*3/uL    Lymphocytes Absolute 1.16 0.80 - 3.00 x10*3/uL    Monocytes Absolute 0.67 0.05 - 0.80 x10*3/uL    Eosinophils Absolute 1.47 (H) 0.00 - 0.40 x10*3/uL    Basophils Absolute 0.11 (H) 0.00 - 0.10 x10*3/uL   Comprehensive Metabolic Panel   Result Value Ref Range    Glucose 117 (H) 74 - 99 mg/dL    Sodium 132 (L) 136 - 145 mmol/L    Potassium 4.1 3.5 - 5.3 mmol/L    Chloride 97 (L) 98 - 107 mmol/L    Bicarbonate 28 21 - 32 mmol/L    Anion Gap 11 10 - 20 mmol/L    Urea Nitrogen 22 6 - 23 mg/dL    Creatinine 1.10 0.50 - 1.30 mg/dL    eGFR 64 >60 mL/min/1.73m*2    Calcium 8.8 8.6 - 10.3 mg/dL    Albumin 3.4 3.4 - 5.0 g/dL    Alkaline Phosphatase 123 33 - 136 U/L    Total Protein 5.8 (L) 6.4 - 8.2 g/dL    AST 14 9 - 39 U/L    Bilirubin, Total 0.8 0.0 - 1.2 mg/dL    ALT 11 10 - 52 U/L   Lactate   Result Value Ref Range    Lactate 0.9 0.4 - 2.0 mmol/L   Blood Culture    Specimen: Peripheral Venipuncture; Blood culture   Result  Value Ref Range    Blood Culture Loaded on Instrument - Culture in progress    Blood Culture    Specimen: Peripheral Venipuncture; Blood culture   Result Value Ref Range    Blood Culture Loaded on Instrument - Culture in progress    Influenza A, and B PCR   Result Value Ref Range    Flu A Result Not Detected Not Detected    Flu B Result Not Detected Not Detected   SARS-CoV-2 RT PCR   Result Value Ref Range    Coronavirus 2019, PCR Not Detected Not Detected   RSV PCR   Result Value Ref Range    RSV PCR Not Detected Not Detected   B-Type Natriuretic Peptide   Result Value Ref Range     (H) 0 - 99 pg/mL   CBC   Result Value Ref Range    WBC 6.4 4.4 - 11.3 x10*3/uL    nRBC 0.0 0.0 - 0.0 /100 WBCs    RBC 4.56 4.50 - 5.90 x10*6/uL    Hemoglobin 13.7 13.5 - 17.5 g/dL    Hematocrit 40.8 (L) 41.0 - 52.0 %    MCV 90 80 - 100 fL    MCH 30.0 26.0 - 34.0 pg    MCHC 33.6 32.0 - 36.0 g/dL    RDW 12.7 11.5 - 14.5 %    Platelets 231 150 - 450 x10*3/uL   Basic metabolic panel   Result Value Ref Range    Glucose 148 (H) 74 - 99 mg/dL    Sodium 129 (L) 136 - 145 mmol/L    Potassium 4.1 3.5 - 5.3 mmol/L    Chloride 97 (L) 98 - 107 mmol/L    Bicarbonate 24 21 - 32 mmol/L    Anion Gap 12 10 - 20 mmol/L    Urea Nitrogen 18 6 - 23 mg/dL    Creatinine 0.95 0.50 - 1.30 mg/dL    eGFR 76 >60 mL/min/1.73m*2    Calcium 8.5 (L) 8.6 - 10.3 mg/dL        Assessment/Plan   Principal Problem:    COPD exacerbation (CMS/[facility])      #COPD exacerbation  -CT chest: Improving airspace disease in the lower lobes bilaterally with  residual streaky and ground-glass opacities in the lung bases likely  postinfectious sequelae.  Scattered debris/mucous plugging throughout the small airways  involving all 5 lobes, greatest in the lung bases.  Mild thoracic aortic aneurysm.  Small hiatal hernia. Fluid reflux throughout the mid and distal  esophagus.  Subtle ground-glass opacities in the lung apices, nonspecific  pneumonitis.  -WBC 8.0  -Lactate  0.9  -Supplemental oxygen to maintain an sp02 greater than 92%  -Currently on RA  -Baseline RA  -IV azithro (Day 2)  -Solumedrol q8  -Mucinex BID  -Bronchodilators   -RT to eval/treat    #Chronic combined systolic and diastolic heart failure  #Essential HTN  #HLD  -Echo (11/23): 1. Left ventricular systolic function is moderately decreased with a 35-40% estimated ejection fraction.   2. Basal and mid inferior wall, basal and mid inferolateral wall, basal and mid anterolateral wall, and apical inferior segment are abnormal.   3. Spectral Doppler shows an impaired relaxation pattern of left ventricular diastolic filling.   4. There is ischemic cardiomyopathy.   5. 2 MR jets : one anteriorly and one posteriorly directed.   6. There is moderate mitral annular calcification.   7. Moderate mitral valve regurgitation.   8. Severe aortic valve stenosis.   9. There is moderate to severe aortic valve cusp calcification.  10. Mild aortic valve regurgitation.  11. Mild pulmonary HTN, estimated CVP is normal.  -BP stable, systolic 120s  -  -Strict I/O  -Daily wt  -Continue PO lasix, assess need for IVP as needed  -Continue carvedilol, lisinopril  -Monitor BP and HR    #BPH  -Not on home therapy  -Bladder scan PRN    DVT ppx  -lovenox    PUD ppx  -pantoprazole    F: PRN  E: Replete per protocol  N: Cardiac, 1500ml FR  A: PIV    Disposition: Pt requires less than 2 inpatient days at this time  Code Status: Full Code    Total accumulated time spent face to face and not face to face preparing to see the patient, obtaining and reviewing separately obtained history; performing a medically appropriate examination and/or evaluation; counseling and educating the patient, family; ordering medications, tests, or procedures; referring and communicating with other health care professionals; documenting clinical information in the patient's medical record; independently interpreting results and communicating the results to the patient,  family; and care coordination was 45 minutes.         Socorro Patterson, APRN-CNP

## 2023-12-23 NOTE — CARE PLAN
Problem: Pain - Adult  Goal: Verbalizes/displays adequate comfort level or baseline comfort level  Outcome: Progressing     Problem: Safety - Adult  Goal: Free from fall injury  Outcome: Progressing     Problem: Discharge Planning  Goal: Discharge to home or other facility with appropriate resources  Outcome: Progressing     Problem: Chronic Conditions and Co-morbidities  Goal: Patient's chronic conditions and co-morbidity symptoms are monitored and maintained or improved  Outcome: Progressing     Problem: Fall/Injury  Goal: Not fall by end of shift  Outcome: Progressing  Goal: Be free from injury by end of the shift  Outcome: Progressing  Goal: Verbalize understanding of personal risk factors for fall in the hospital  Outcome: Progressing  Goal: Verbalize understanding of risk factor reduction measures to prevent injury from fall in the home  Outcome: Progressing  Goal: Pace activities to prevent fatigue by end of the shift  Outcome: Progressing     Problem: Not Adhereing To Sodium Restrictions  Goal: 2000mg or less of sodium per day over the next 30 days  Outcome: Progressing     Problem: Not Adhereing To Fluid Restrictions  Goal: Patient will verbalize understanding of fluid restrictions  Outcome: Progressing   The patient's goals for the shift include      The clinical goals for the shift include maintain Spo2 92% or greater on room air

## 2023-12-23 NOTE — NURSING NOTE
Secure chat to ANNE Wang NP at this time. Patient asking for HS meds- home med list completed and ready for review.

## 2023-12-23 NOTE — CARE PLAN
The patient's goals for the shift include      The clinical goals for the shift include maintain Spo2 92% or greater on room air    Goal met. IV ATB therapy continues. Spouse spending the night.

## 2023-12-24 VITALS
RESPIRATION RATE: 19 BRPM | HEIGHT: 71 IN | DIASTOLIC BLOOD PRESSURE: 84 MMHG | OXYGEN SATURATION: 92 % | HEART RATE: 79 BPM | TEMPERATURE: 97.2 F | BODY MASS INDEX: 23.3 KG/M2 | SYSTOLIC BLOOD PRESSURE: 146 MMHG | WEIGHT: 166.45 LBS

## 2023-12-24 LAB
ANION GAP SERPL CALC-SCNC: 12 MMOL/L (ref 10–20)
BUN SERPL-MCNC: 21 MG/DL (ref 6–23)
CALCIUM SERPL-MCNC: 8.3 MG/DL (ref 8.6–10.3)
CHLORIDE SERPL-SCNC: 101 MMOL/L (ref 98–107)
CO2 SERPL-SCNC: 24 MMOL/L (ref 21–32)
CREAT SERPL-MCNC: 0.91 MG/DL (ref 0.5–1.3)
ERYTHROCYTE [DISTWIDTH] IN BLOOD BY AUTOMATED COUNT: 12.9 % (ref 11.5–14.5)
GFR SERPL CREATININE-BSD FRML MDRD: 80 ML/MIN/1.73M*2
GLUCOSE SERPL-MCNC: 148 MG/DL (ref 74–99)
HCT VFR BLD AUTO: 39.9 % (ref 41–52)
HGB BLD-MCNC: 13.4 G/DL (ref 13.5–17.5)
MCH RBC QN AUTO: 30 PG (ref 26–34)
MCHC RBC AUTO-ENTMCNC: 33.6 G/DL (ref 32–36)
MCV RBC AUTO: 89 FL (ref 80–100)
NRBC BLD-RTO: 0 /100 WBCS (ref 0–0)
PLATELET # BLD AUTO: 232 X10*3/UL (ref 150–450)
POTASSIUM SERPL-SCNC: 3.9 MMOL/L (ref 3.5–5.3)
PROCALCITONIN SERPL-MCNC: 0.02 NG/ML
RBC # BLD AUTO: 4.47 X10*6/UL (ref 4.5–5.9)
SODIUM SERPL-SCNC: 133 MMOL/L (ref 136–145)
WBC # BLD AUTO: 12.9 X10*3/UL (ref 4.4–11.3)

## 2023-12-24 PROCEDURE — 2500000004 HC RX 250 GENERAL PHARMACY W/ HCPCS (ALT 636 FOR OP/ED): Performed by: INTERNAL MEDICINE

## 2023-12-24 PROCEDURE — 94668 MNPJ CHEST WALL SBSQ: CPT

## 2023-12-24 PROCEDURE — 2500000004 HC RX 250 GENERAL PHARMACY W/ HCPCS (ALT 636 FOR OP/ED): Mod: MUE | Performed by: NURSE PRACTITIONER

## 2023-12-24 PROCEDURE — G0378 HOSPITAL OBSERVATION PER HR: HCPCS

## 2023-12-24 PROCEDURE — 94640 AIRWAY INHALATION TREATMENT: CPT

## 2023-12-24 PROCEDURE — 85027 COMPLETE CBC AUTOMATED: CPT

## 2023-12-24 PROCEDURE — 82565 ASSAY OF CREATININE: CPT

## 2023-12-24 PROCEDURE — 99231 SBSQ HOSP IP/OBS SF/LOW 25: CPT

## 2023-12-24 PROCEDURE — 2500000001 HC RX 250 WO HCPCS SELF ADMINISTERED DRUGS (ALT 637 FOR MEDICARE OP): Performed by: NURSE PRACTITIONER

## 2023-12-24 PROCEDURE — 2500000004 HC RX 250 GENERAL PHARMACY W/ HCPCS (ALT 636 FOR OP/ED)

## 2023-12-24 PROCEDURE — 36415 COLL VENOUS BLD VENIPUNCTURE: CPT

## 2023-12-24 PROCEDURE — 96376 TX/PRO/DX INJ SAME DRUG ADON: CPT | Performed by: INTERNAL MEDICINE

## 2023-12-24 PROCEDURE — 2500000002 HC RX 250 W HCPCS SELF ADMINISTERED DRUGS (ALT 637 FOR MEDICARE OP, ALT 636 FOR OP/ED): Performed by: NURSE PRACTITIONER

## 2023-12-24 RX ORDER — SODIUM CHLORIDE 9 MG/ML
INJECTION, SOLUTION INTRAVENOUS
Status: DISCONTINUED
Start: 2023-12-24 | End: 2023-12-24 | Stop reason: HOSPADM

## 2023-12-24 RX ORDER — METHYLPREDNISOLONE 4 MG/1
TABLET ORAL
Qty: 21 TABLET | Refills: 0 | Status: SHIPPED | OUTPATIENT
Start: 2023-12-24 | End: 2023-12-31

## 2023-12-24 RX ORDER — GUAIFENESIN 600 MG/1
1200 TABLET, EXTENDED RELEASE ORAL 2 TIMES DAILY
Qty: 28 TABLET | Refills: 0 | Status: SHIPPED | OUTPATIENT
Start: 2023-12-24 | End: 2023-12-31

## 2023-12-24 RX ORDER — AZITHROMYCIN 500 MG/1
500 TABLET, FILM COATED ORAL DAILY
Qty: 4 TABLET | Refills: 0 | Status: SHIPPED | OUTPATIENT
Start: 2023-12-24 | End: 2023-12-28

## 2023-12-24 RX ADMIN — METHYLPREDNISOLONE SODIUM SUCCINATE 40 MG: 40 INJECTION, POWDER, FOR SOLUTION INTRAMUSCULAR; INTRAVENOUS at 01:00

## 2023-12-24 RX ADMIN — POTASSIUM CHLORIDE 20 MEQ: 1500 TABLET, EXTENDED RELEASE ORAL at 09:31

## 2023-12-24 RX ADMIN — IPRATROPIUM BROMIDE AND ALBUTEROL SULFATE 3 ML: .5; 3 SOLUTION RESPIRATORY (INHALATION) at 08:32

## 2023-12-24 RX ADMIN — GUAIFENESIN 600 MG: 600 TABLET, EXTENDED RELEASE ORAL at 09:31

## 2023-12-24 RX ADMIN — METHYLPREDNISOLONE SODIUM SUCCINATE 40 MG: 40 INJECTION, POWDER, FOR SOLUTION INTRAMUSCULAR; INTRAVENOUS at 09:33

## 2023-12-24 RX ADMIN — CARVEDILOL 12.5 MG: 6.25 TABLET, FILM COATED ORAL at 09:31

## 2023-12-24 RX ADMIN — PANTOPRAZOLE SODIUM 40 MG: 40 TABLET, DELAYED RELEASE ORAL at 09:33

## 2023-12-24 RX ADMIN — LISINOPRIL 10 MG: 10 TABLET ORAL at 09:31

## 2023-12-24 RX ADMIN — FUROSEMIDE 40 MG: 40 TABLET ORAL at 09:33

## 2023-12-24 ASSESSMENT — COGNITIVE AND FUNCTIONAL STATUS - GENERAL
CLIMB 3 TO 5 STEPS WITH RAILING: A LITTLE
HELP NEEDED FOR BATHING: A LITTLE
MOBILITY SCORE: 21
WALKING IN HOSPITAL ROOM: A LITTLE
TOILETING: A LITTLE
DAILY ACTIVITIY SCORE: 22
MOVING TO AND FROM BED TO CHAIR: A LITTLE

## 2023-12-24 ASSESSMENT — PAIN - FUNCTIONAL ASSESSMENT
PAIN_FUNCTIONAL_ASSESSMENT: 0-10
PAIN_FUNCTIONAL_ASSESSMENT: 0-10

## 2023-12-24 ASSESSMENT — PAIN SCALES - GENERAL
PAINLEVEL_OUTOF10: 0 - NO PAIN
PAINLEVEL_OUTOF10: 0 - NO PAIN

## 2023-12-24 NOTE — NURSING NOTE
1032 Reviewed who would be picking up the patient and his wife, the wife stated their son Abdias. Both patient and wife want to eat lunch here and then go after that. Stated to have Abdias come at 1300.    1217 Patient and wife decided not to eat lunch, that the breakfast they had has kept them full.     1302 Reviewed discharge instructions with patient and his wife. Both are aware new medications are to be picked up at [facility]. Aware to make follow up appointment with PCP. Educated both patient and wife to continue the 1500ml fluid restriction, went into detail how to pick his favorite glass, measure how much it holds to know how many he can have in a day. Wife and patient nod heads in understanding.

## 2023-12-24 NOTE — CARE PLAN
The clinical goals for the shift include Patient will remain free of falls during shift    Over the shift, the patient did  make progress toward the following goals. Patient discharged to home with wife.

## 2023-12-24 NOTE — DISCHARGE SUMMARY
Discharge Diagnosis  COPD exacerbation (CMS/[facility])    Issues Requiring Follow-Up  No issues    Discharge Meds     Your medication list        START taking these medications        Instructions Last Dose Given Next Dose Due   azithromycin 500 mg tablet  Commonly known as: Zithromax      Take 1 tablet (500 mg) by mouth once daily for 4 days.       guaiFENesin 600 mg 12 hr tablet  Commonly known as: Mucinex      Take 2 tablets (1,200 mg) by mouth 2 times a day for 7 days. Do not crush, chew, or split.       methylPREDNISolone 4 mg tablets  Commonly known as: Medrol Dospak      Follow schedule on package instructions              CONTINUE taking these medications        Instructions Last Dose Given Next Dose Due   carvedilol 25 mg tablet  Commonly known as: Coreg           furosemide 40 mg tablet  Commonly known as: Lasix           ipratropium-albuteroL 0.5-2.5 mg/3 mL nebulizer solution  Commonly known as: Duo-Neb      Take 3 mL by nebulization 4 times a day.       lisinopril 10 mg tablet      TAKE 1 TABLET TWICE A DAY       potassium chloride CR 20 mEq ER tablet  Commonly known as: Klor-Con M20                     Where to Get Your Medications        These medications were sent to [facility]/pharmacy #[id number] [id number]      Phone: [phone]   azithromycin 500 mg tablet  guaiFENesin 600 mg 12 hr tablet  methylPREDNISolone 4 mg tablets         Test Results Pending At Discharge  Pending Labs       Order Current Status    Extra Urine Gray Tube Collected (12/22/23 1820)    Urinalysis with Reflex Culture and Microscopic Collected (12/22/23 1820)    Blood Culture Preliminary result    Blood Culture Preliminary result            Hospital Course   Radhames Ken is a 89 y.o. male presenting with cough. Pt and spouse report that pt was treated for pneumonia a month ago. He has now had a productive cough with shortness of breath and wheezing that has progressively worsened over  the last week or so. Pt denies any chest pain, palpitations, or other associated symptoms. Pt is stable on RA, however, very wheezy on exam.     Hospital Course:  #COPD exacerbation  -CT chest: Improving airspace disease in the lower lobes bilaterally with  residual streaky and ground-glass opacities in the lung bases likely  postinfectious sequelae.  Scattered debris/mucous plugging throughout the small airways  involving all 5 lobes, greatest in the lung bases.  Mild thoracic aortic aneurysm.  Small hiatal hernia. Fluid reflux throughout the mid and distal  esophagus.  Subtle ground-glass opacities in the lung apices, nonspecific  pneumonitis.  -WBC 8.0  -Lactate 0.9  -Supplemental oxygen to maintain an sp02 greater than 92%  -Currently on RA  -Baseline RA  -IV azithro (Day 2)  -Solumedrol q8  -Mucinex BID  -Bronchodilators   -RT to eval/treat     #Chronic combined systolic and diastolic heart failure  #Essential HTN  #HLD  -Echo (11/23): 1. Left ventricular systolic function is moderately decreased with a 35-40% estimated ejection fraction.   2. Basal and mid inferior wall, basal and mid inferolateral wall, basal and mid anterolateral wall, and apical inferior segment are abnormal.   3. Spectral Doppler shows an impaired relaxation pattern of left ventricular diastolic filling.   4. There is ischemic cardiomyopathy.   5. 2 MR jets : one anteriorly and one posteriorly directed.   6. There is moderate mitral annular calcification.   7. Moderate mitral valve regurgitation.   8. Severe aortic valve stenosis.   9. There is moderate to severe aortic valve cusp calcification.  10. Mild aortic valve regurgitation.  11. Mild pulmonary HTN, estimated CVP is normal.  -BP stable, systolic 120s  -  -Strict I/O  -Daily wt  -Continue PO lasix, assess need for IVP as needed  -Continue carvedilol, lisinopril  -Monitor BP and HR     #BPH  -Not on home therapy  -Bladder scan PRN     DVT ppx  -lovenox     PUD  ppx  -pantoprazole     F: PRN  E: Replete per protocol  N: Cardiac, 1500ml FR  A: PIV     Disposition: Pt stable for discharge on oral antibiotics and steroids.   Code Status: Full Code    Total cumulative time spent in preparation of this discharge including documentation review, coordination of care with the medical team including PT/SW/care coordinators and treating consultants, discussion with patient and pertinent family members and finalization of prescriptions, followup appointments and this discharge summary was approximately  45 minutes. Over 50% of the time was spent face-to-face counseling the patient on diagnosis, prescriptions, and follow up appointments.     Pertinent Physical Exam At Time of Discharge  Physical Exam  HENT:      Head: Normocephalic.      Nose: Nose normal.      Mouth/Throat:      Pharynx: Oropharynx is clear.   Eyes:      Conjunctiva/sclera: Conjunctivae normal.   Cardiovascular:      Rate and Rhythm: Normal rate and regular rhythm.   Pulmonary:      Breath sounds: Rhonchi present.   Abdominal:      General: Bowel sounds are normal.      Palpations: Abdomen is soft.   Musculoskeletal:         General: Normal range of motion.      Cervical back: Normal range of motion and neck supple.   Skin:     General: Skin is dry.   Neurological:      Mental Status: He is alert. He is disoriented.   Psychiatric:         Mood and Affect: Mood normal.         Behavior: Behavior normal.         Outpatient Follow-Up  No future appointments.      VELMA Sawyer-CNP

## 2023-12-24 NOTE — CARE PLAN
The patient's goals for the shift include  to go home soon     The clinical goals for the shift include remain free of falls by end of shift    Over the shift, the patient did not make progress toward the following goals. Barriers to progression include IV antibiotics and steroids. Recommendations to address these barriers include working with team to decide discharge medications and plan.    Problem: Fall/Injury  Goal: Verbalize understanding of personal risk factors for fall in the hospital  Outcome: Not Progressing  Goal: Verbalize understanding of risk factor reduction measures to prevent injury from fall in the home  Outcome: Not Progressing

## 2023-12-24 NOTE — DISCHARGE INSTRUCTIONS
Chronic Obstructive Pulmonary Disease (COPD) Discharge Instructions    About this topic  Chronic obstructive pulmonary disease is also called COPD. It is a lung illness. COPD is often caused by inflammation of the bronchial tubes that carry air into your lungs or by infection. Chronic bronchitis and emphysema are mostly caused by smoking. It can also be caused by breathing in toxic fumes or gases. With chronic bronchitis, your cough will bring up mucus and can last for months.    What care is needed at home?  Ask your doctor what you need to do when you go home. Make sure you ask questions if you do not understand what the doctor says.  If you smoke, try to quit. Your doctor or nurse can help you with this.  Stay away from smoke-filled places. Avoid other things that may cause breathing problems like fumes, pollution, dust, and other common allergens.  If you have an inhaler to take when you are feeling short of breath, be sure to carry it with you all the time. Then, you can take it when needed. Take all your other medicines as directed.  The doctor may have ordered antibiotics to treat an infection. It is important to take all of your antibiotics even if you start to feel better.  Work to get as healthy as possible.  Be active at home by:  Walking. Ask your doctor how far you can walk and how often you should walk.  Exercising your arms, shoulders, and legs. Ask your doctor or therapist about what exercises are best for you.  Do breathing exercises 2 to 3 times each day.  Wear a mask when you are around dust or pollution.  Protect yourself from getting sick.  Wash your hands often.  Ask visitors with a cold to wear a mask or reschedule their visit.  Save your energy.  Place the things you use within easy reach where you do not have to bend over or stretch to get them.  Use a cart with wheels to move things around your house.  Avoid heavy activities unless your doctor tells you it is OK.  Use oxygen if you need it.  Your doctor may give you oxygen to use at home. You will be taught how to use the oxygen at home by the staff that brings it to your home. Follow your doctor's advice on using it.  Never change the amount of oxygen flowing without talking to your doctor.  Always have a back-up oxygen supply at home or when you go out.  No candles, matches, cigarettes, or open flame should ever come near your oxygen.  Never smoke when using oxygen. This can cause severe burns to your face, mouth, throat, and lungs.    What follow-up care is needed?  Your doctor may ask you to make visits to the office to check on your progress. Be sure to keep these visits. You may be sent to a doctor who is specializes in lung illnesses. Your doctor may suggest you go to a lung rehab center to help improve your health.  Your doctor may order:  Breathing tests that check the amount and force of the air you breathe in and out. These are called pulmonary function tests or PFTs.  Chest x-rays  CT scan of your chest  Arterial blood gas (ABG) to measure the amount of oxygen in your blood that is taken directly from your artery  A breathing therapist to help you with your breathing exercises  Someone who can help you stop smoking, if you smoke  What drugs may be needed?  Take your drugs as ordered by your doctor. The doctor may order drugs to:  Make breathing easier  Help decrease coughing  Lower swelling in your airways  Prevent infection  Help you stop smoking  Will physical activity be limited?  Your physical activities may be limited as long as you have the signs of this health problem. Avoid heavy and tiring activities. Talk to your doctor about the right amount of activity for you.  What problems could happen?  Lung infections  High blood pressure  Heart problems  Anxiety and low mood  Lung collapse  Fluid in the lungs  What can be done to prevent this health problem?  If you have COPD you can take some steps to keep it from getting worse.  Try to  quit smoking.  If you have COPD already, continuing to smoke will make it worse more quickly.  Wear protective gear, like a mask and goggles, if exposed to irritants or toxins at work.  Stay away from crowded places.  Get a flu shot each year. Ask your doctor if you need a pneumonia shot.    When do I need to call the doctor?  Activate the emergency medical system right away if you have signs of a heart attack. Call 911 in the [place] or [place]. The sooner treatment begins, the better your chances for recovery. Call for emergency help right away if you have:  Signs of heart attack which may include:  Severe chest pain, pressure, or discomfort with:  Breathing trouble; sweating; upset stomach; or cold, clammy skin.  Pain in your arms, back, or jaw.  Worse pain with activity like walking up stairs.  Fast or irregular heartbeat.  Feeling dizzy, faint, or weak.  Call your regular doctor if:  You are having so much trouble breathing that you can only say one or two words at a time.  You need to sit upright at all times to be able to breathe and or cannot lie down.  You are very tired from working to catch your breath or you are sweating from trying to breathe.  You have trouble breathing when talking or sitting still.  You cough up blood.  You have a fever of 100.4°F (38°C) or higher or chills.  You are feeling weak when doing activities.  You have new or worsening cough.  You cough up more mucus.  You feel more short of breath.  Teach Back: Helping You Understand  The Teach Back Method helps you understand the information we are giving you. After you talk with the staff, tell them in your own words what you learned. This helps to make sure the staff has described each thing clearly. It also helps to explain things that may have been confusing. Before going home, make sure you can do these:  I can tell you about my condition.  I can tell you what I can do to help protect my lungs and save my strength.  I can tell  you what I will do if I have chest tightness, wheezing, a cough that does not go away, or trouble breathing.  Last Reviewed Date  2022-02-11          Fluid Restricted Diet    About this topic  Some health problems cause the body to hold too much fluid. You may have problems with your heart, liver, or kidneys. This may cause you to keep too much fluid in your body. Then, you may have trouble breathing or feel short of breath. You may gain weight and your ankles may swell. Your doctor may suggest you limit the amount of fluids that you take in. This is a fluid restricted diet and may help lower the amount of fluid in your body.  General  Your doctor may ask you to keep a record of how much you are drinking and how much urine you pass each day. This will help your doctor know if your body is getting rid of the right amount of fluid.    What will the results be?  Following a fluid restricted diet may help keep extra fluid from building up in the body.  What lifestyle changes are needed?  Pour drinks into small cups. This helps to limit how much you drink.  Cold drinks may help with thirst better than hot ones.  Divide the liquids you are allowed throughout the day. Have them with meals and snacks.  Take your drugs when you have your allowed drinks.  Who should use this diet?  This diet should only be used when ordered by your doctor. Your doctor will decide how much fluid is right for you. The amount of fluid is based on your size and health problems.  What foods are good to eat?  Your doctor and dietitian will help you learn how much fluid you may have each day. It is important to stay in close touch with your doctor. Ask your doctor or dietitian if there are any foods that are good for you to eat.  What foods should be limited or avoided?  Avoid salt. Using salt and eating foods with lots of salt can add to your thirst. Try other seasoning to help flavor foods, such as pepper, garlic, and herbs.  Some foods become  liquid at room temperature or have fluids that are hidden. You need to count these as part of your daily fluid intake.  Ice cream, gelatin, milkshakes, sorbet, sherbet, and popsicles  Soups, sauces, and gravy  Watery fruits like watermelon, grapes, and grapefruit  Flavored water, sport and energy drinks  Avoid beer, wine, and mixed drinks (alcohol).  Ask your doctor or dietitian if there are any foods you need to avoid.  Helpful tips  Watch how much fluids you are taking in.  Read food labels. Look for foods that say: low salt, low sodium, unsalted, or no salt added.  Drain fluids from canned or cooked fruits and vegetables before eating.  Choose a water bottle with markings for volume to keep track of your fluid intake.  Avoid becoming thirsty.  Sucking on hard candy or chewing gum may help with a dry mouth.  Use a mouth moisturizer to help with chapped lips.  Take care of yourself.  Make a menu in advance to help you carefully choose what to have in your diet.  Keep a record of the food you eat. This will help you count the calories you are taking in.  Take good care of your teeth and mouth. Use a mouth rinse to help with a dry mouth.  Weigh yourself each day. Keep a record of your weight. Show this to your doctor.  Avoid spending too much time in the sun.  Last Reviewed Date  2021-09-22

## 2023-12-26 NOTE — TELEPHONE ENCOUNTER
Transition of Care    Inpatient facility: [facility]  Discharge diagnosis: COPD EXACERBATION  Discharged to: HOME  Discharge date: 12/24/23  Initial Call date: 12/26/23  Spoke with patient/caregiver: SPOKE WITH WIFE  Do you need assistance  visits prior to your PCP visit: No  Home health care ordered: No  Have you been contacted by home care and have a start of care date: No  Are you taking medications as prescribed at discharge: Yes  Referral to [facility] Pharmacist: No  Patient advised to bring all medications to PCP follow-up appointment.  Patient advised to follow discharge instructions until provider follow-up.  TCM visit date: 1/3/24  TCM provider visit with: DR. AVILA

## 2023-12-27 ENCOUNTER — PATIENT OUTREACH (OUTPATIENT)
Dept: CARE COORDINATION | Facility: CLINIC | Age: 88
End: 2023-12-27
Payer: MEDICARE

## 2023-12-27 LAB
BACTERIA BLD CULT: NORMAL
BACTERIA BLD CULT: NORMAL

## 2023-12-27 NOTE — PROGRESS NOTES
[facility]  Admitted 12/22/2023  Discharged 12/24/2023  Dx: COPD    Outreach call to patient to support a smooth transition of care from recent admission.  No answer and unable to leave message. Enrolled patient in [employer] chatbot for additional support and patient education through transition period.  Will continue to monitor through transition period.    Rachel Lopez RN

## 2024-01-03 ENCOUNTER — APPOINTMENT (OUTPATIENT)
Dept: PRIMARY CARE | Facility: CLINIC | Age: 89
End: 2024-01-03
Payer: MEDICARE

## 2024-01-03 DIAGNOSIS — J44.1 COPD EXACERBATION (MULTI): Primary | ICD-10-CM

## 2024-01-10 ENCOUNTER — OFFICE VISIT (OUTPATIENT)
Dept: PRIMARY CARE | Facility: CLINIC | Age: 89
End: 2024-01-10
Payer: MEDICARE

## 2024-01-10 VITALS
SYSTOLIC BLOOD PRESSURE: 102 MMHG | WEIGHT: 154 LBS | DIASTOLIC BLOOD PRESSURE: 58 MMHG | BODY MASS INDEX: 21.48 KG/M2 | HEART RATE: 85 BPM | TEMPERATURE: 98 F | OXYGEN SATURATION: 97 %

## 2024-01-10 DIAGNOSIS — J42 CHRONIC BRONCHITIS, UNSPECIFIED CHRONIC BRONCHITIS TYPE (MULTI): Primary | ICD-10-CM

## 2024-01-10 PROCEDURE — 3074F SYST BP LT 130 MM HG: CPT | Performed by: FAMILY MEDICINE

## 2024-01-10 PROCEDURE — 1126F AMNT PAIN NOTED NONE PRSNT: CPT | Performed by: FAMILY MEDICINE

## 2024-01-10 PROCEDURE — 99214 OFFICE O/P EST MOD 30 MIN: CPT | Performed by: FAMILY MEDICINE

## 2024-01-10 PROCEDURE — 3078F DIAST BP <80 MM HG: CPT | Performed by: FAMILY MEDICINE

## 2024-01-10 PROCEDURE — 1159F MED LIST DOCD IN RCRD: CPT | Performed by: FAMILY MEDICINE

## 2024-01-10 PROCEDURE — 1036F TOBACCO NON-USER: CPT | Performed by: FAMILY MEDICINE

## 2024-01-10 ASSESSMENT — ENCOUNTER SYMPTOMS
MYALGIAS: 0
PALPITATIONS: 0
APPETITE CHANGE: 0
HEMATURIA: 0
SINUS PRESSURE: 0
ABDOMINAL PAIN: 0
HEADACHES: 0
VOMITING: 0
COUGH: 1
FEVER: 0
NERVOUS/ANXIOUS: 0
SORE THROAT: 0
DYSPHORIC MOOD: 0
NAUSEA: 0
EYE ITCHING: 0
WEAKNESS: 0
EYE DISCHARGE: 0
UNEXPECTED WEIGHT CHANGE: 0
DIARRHEA: 0
CONSTIPATION: 0
JOINT SWELLING: 0
FLANK PAIN: 0
SHORTNESS OF BREATH: 1
ARTHRALGIAS: 0
BLOOD IN STOOL: 0
SLEEP DISTURBANCE: 0
DIZZINESS: 0
LIGHT-HEADEDNESS: 0
WHEEZING: 0
DYSURIA: 0
RHINORRHEA: 0
ACTIVITY CHANGE: 0
NUMBNESS: 0

## 2024-01-10 NOTE — PROGRESS NOTES
Subjective   Patient ID: Radhames Ken is a 89 y.o. male who presents for Hospital Follow-up ([facility]- discharged about 2 weeks ago.  Was seen due to bronchitis with concerns of pneumonia. Admitted for 4-5 days.  He has no current concerns.).    HPI PATIENT WITH CHRONIC CHF  HAD PNEUMONIA /PNEUMONIA MORE SO COPD EXACERBATION   PER THE HOSPITAL CHART   FEELING AT BASELINE NOW     Review of Systems   Constitutional:  Negative for activity change, appetite change, fever and unexpected weight change.   HENT:  Negative for congestion, ear pain, postnasal drip, rhinorrhea, sinus pressure and sore throat.    Eyes:  Negative for discharge, itching and visual disturbance.   Respiratory:  Positive for cough and shortness of breath. Negative for wheezing.         Much improved    Cardiovascular:  Negative for chest pain, palpitations and leg swelling.   Gastrointestinal:  Negative for abdominal pain, blood in stool, constipation, diarrhea, nausea and vomiting.   Endocrine: Negative for cold intolerance, heat intolerance and polyuria.   Genitourinary:  Negative for dysuria, flank pain and hematuria.   Musculoskeletal:  Negative for arthralgias, gait problem, joint swelling and myalgias.   Skin:  Negative for rash.   Allergic/Immunologic: Negative for environmental allergies and food allergies.   Neurological:  Negative for dizziness, syncope, weakness, light-headedness, numbness and headaches.   Psychiatric/Behavioral:  Negative for dysphoric mood and sleep disturbance. The patient is not nervous/anxious.        Objective   /58   Pulse 85   Temp 36.7 °C (98 °F)   Wt 69.9 kg (154 lb)   SpO2 97%   BMI 21.48 kg/m²     Physical Exam  Vitals and nursing note reviewed.   Constitutional:       Appearance: Normal appearance.   HENT:      Head: Normocephalic.      Mouth/Throat:      Mouth: Mucous membranes are moist.   Cardiovascular:      Rate and Rhythm: Normal rate and regular rhythm.      Pulses: Normal pulses.       Heart sounds: Normal heart sounds. No murmur heard.     No friction rub. No gallop.   Pulmonary:      Effort: Pulmonary effort is normal. No respiratory distress.      Breath sounds: No wheezing.      Comments: DIMINISHED IN ALL LUNG FIELDS   Abdominal:      General: Bowel sounds are normal. There is no distension.      Palpations: Abdomen is soft.      Tenderness: There is no abdominal tenderness.   Musculoskeletal:         General: No deformity. Normal range of motion.   Skin:     General: Skin is warm and dry.      Capillary Refill: Capillary refill takes less than 2 seconds.   Neurological:      General: No focal deficit present.      Mental Status: He is alert and oriented to person, place, and time.   Psychiatric:         Mood and Affect: Mood normal.         Assessment/Plan   Diagnoses and all orders for this visit:  Chronic bronchitis, unspecified chronic bronchitis type (CMS/HCC)

## 2024-01-19 ENCOUNTER — PATIENT OUTREACH (OUTPATIENT)
Dept: CARE COORDINATION | Facility: CLINIC | Age: 89
End: 2024-01-19
Payer: MEDICARE

## 2024-01-19 NOTE — PROGRESS NOTES
Outreach call to patient following up on appointment with primary care provider. Left voicemail message with CM name and contact number. Will continue to follow.      Rachel Lopez RN/ASTER  [facility]  [phone]

## 2024-01-19 NOTE — ED PROVIDER NOTES
HPI   Chief Complaint   Patient presents with    Shortness of Breath     Patient has been having a hard time breathing and experiencing a lot of wheezing and weakness. Denies chest pain        History of present illness:  89-year-old male presents the emergency room for complaints of worsening shortness of breath for the past week.  The patient states that he has a history of COPD as well as heart disease, hypertension, hyperlipidemia, chf and does not wear oxygen at home.  The patient states that multiple family numbers been sick recently and he states that he feels like he caught something that he can does not get rid of.  He states the cough has been progressive getting worse and is not coming increasingly short of breath with any exertion.  He denies any nausea or vomiting or chest pain at this time.      Social history: Negative for alcohol and drug use.    Review of systems:   Gen.: No weight loss,  fever.   Eyes: No vision loss, double vision   ENT: No pharyngitis, neck pain  Cardiac: No  palpitations, syncope  Pulmonary: No  hemoptysis.   Heme/lymph: No swollen glands, fever, bleeding.   GI: No abdominal pain, change in bowel habits, melena, hematemesis, hematochezia, nausea, vomiting, diarrhea.   : No discharge, dysuria, frequency, urgency, hematuria.   Musculoskeletal: No limb pain, joint pain, joint swelling.   Skin: No rashes.   Review of systems is otherwise negative unless stated above or in history of present illness.        Physical exam:  General: Vitals noted, no distress. Afebrile.   EENT: No lymphadenopathy appreciated  Cardiac: Regular, rate, rhythm, no murmur.   Pulmonary: Diminished lung sounds throughout with wheezing appreciated throughout as well  Abdomen: Soft, nonsurgical. Nontender. No peritoneal signs. Normoactive bowel sounds.   Extremities: No peripheral edema.   Skin: No rash.   Neuro: No focal neurologic deficits            Medical decision making:   Testing: CBC CMP BNP troponin,  CT scan of chest without contrast: CT scan shows improving pneumonia at this time BMP shows elevation but of 400, troponin unremarkable  Treatment: Breathing treatments given and steroids given  Reevaluation: Patient reports significant improvement in symptoms  Plan: 89-year-old male presents the emergency room for complaints of worsening shortness of breath for the past week.  The patient states that he has a history of COPD as well as heart disease, hypertension, hyperlipidemia, chf and does not wear oxygen at home.  The patient states that multiple family numbers been sick recently and he states that he feels like he caught something that he can does not get rid of.  He states the cough has been progressive getting worse and is not coming increasingly short of breath with any exertion.  He denies any nausea or vomiting or chest pain at this time.   Pulmonary: Diminished lung sounds throughout with wheezing appreciated throughout as well.  Explained to the patient test results as well as to his family.  They are agreeable to have the patient admitted at this time for further care and treatment for COPD exacerbation.  I spoke with the hospitalist team is also in agreement with this plan.  Impression:   1.  COPD exacerbation            History provided by:  Patient   used: No                        No data recorded                Patient History   Past Medical History:   Diagnosis Date    CHF (congestive heart failure) (CMS/[facility])     Chronic sinusitis, unspecified 03/28/2017    Sinobronchitis    COVID-19 05/05/2022    COVID-19    COVID-19 05/05/2022    Pneumonia due to COVID-19 virus    Elevated prostate specific antigen (PSA)     Rising PSA level    Encounter for screening for malignant neoplasm, site unspecified 07/24/2018    Cancer screening    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    Diarrhea, functional    Other disorders of plasma-protein metabolism, not elsewhere classified 05/30/2017    Serum  albumin decreased    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    Personal history of diseases of the skin and subcutaneous tissue 02/18/2019    History of eczema    Personal history of other diseases of male genital organs     History of BPH    Personal history of other diseases of the circulatory system     History of hypertension    Personal history of other diseases of the circulatory system     History of mitral valve insufficiency    Personal history of other diseases of the musculoskeletal system and connective tissue     Personal history of arthritis    Personal history of other endocrine, nutritional and metabolic disease     History of hyperlipidemia    Personal history of other specified conditions 11/09/2018    History of seizure    Personal history of other specified conditions 05/05/2022    History of vomiting     Past Surgical History:   Procedure Laterality Date    CARDIAC SURGERY  04/28/2014    Heart Surgery    CHOLECYSTECTOMY  04/28/2014    Cholecystectomy    GALLBLADDER SURGERY  04/28/2014    Gallbladder Surgery    MR HEAD ANGIO WO IV CONTRAST  9/30/2018    MR HEAD ANGIO WO IV CONTRAST 9/30/2018 [facility]    MR NECK ANGIO WO IV CONTRAST  9/30/2018    MR NECK ANGIO WO IV CONTRAST 9/30/2018 [facility] CLINICAL [facility]GAC[facility]    OTHER SURGICAL HISTORY  04/28/2014    Knee Repair    OTHER SURGICAL HISTORY  04/28/2014    Needle Biopsy Of Prostate    OTHER SURGICAL HISTORY  03/05/2021    Complete colonoscopy    OTHER SURGICAL HISTORY  03/05/2021    Endoscopy    OTHER SURGICAL HISTORY  10/08/2018    Common Bile Duct Stone Removal    PROSTATE SURGERY  10/08/2018    Prostate Surgery     Family History   Problem Relation Name Age of Onset    Heart disease Mother      Breast cancer Mother      Lung cancer Father      Cancer Brother      Cancer Brother      Cancer Brother      Cancer Brother       Social History     Tobacco Use    Smoking status: Never    Smokeless tobacco: Never    Vaping Use    Vaping Use: Never used   Substance Use Topics    Alcohol use: Not Currently    Drug use: Never       Physical Exam   ED Triage Vitals   Temp Heart Rate Resp BP   12/22/23 1704 12/22/23 1704 12/22/23 1704 12/22/23 1704   37.1 °C (98.7 °F) 89 (!) 30 (!) 168/103      SpO2 Temp Source Heart Rate Source Patient Position   12/22/23 1704 12/22/23 1704 12/22/23 1704 12/22/23 1704   95 % Oral Monitor Sitting      BP Location FiO2 (%)     12/22/23 1704 12/23/23 2102     Left arm 21 %       Physical Exam    ED Course & MDM   Diagnoses as of 01/19/24 1445   Bronchitis   Shortness of breath   COPD with acute exacerbation (CMS/[facility])       Medical Decision Making      Procedure  Procedures     Juan Hernandez PA-C  01/19/24 1956

## 2024-01-22 DIAGNOSIS — J18.9 PNEUMONIA OF BOTH LOWER LOBES DUE TO INFECTIOUS ORGANISM: ICD-10-CM

## 2024-01-22 RX ORDER — IPRATROPIUM BROMIDE AND ALBUTEROL SULFATE 2.5; .5 MG/3ML; MG/3ML
3 SOLUTION RESPIRATORY (INHALATION)
Qty: 360 ML | Refills: 0 | Status: SHIPPED | OUTPATIENT
Start: 2024-01-22 | End: 2024-02-19

## 2024-01-25 ENCOUNTER — PATIENT OUTREACH (OUTPATIENT)
Dept: CARE COORDINATION | Facility: CLINIC | Age: 89
End: 2024-01-25
Payer: MEDICARE

## 2024-01-25 NOTE — PROGRESS NOTES
Outreach call to patient to check in 30 days after hospital discharge to support smooth transition of care. No answer. No option to leave message. No response via [employer]. No additional outreach needed at this time.     Rachel Lopez RN/CM  [facility]  [phone]

## 2024-02-09 ENCOUNTER — HOSPITAL ENCOUNTER (OUTPATIENT)
Dept: RADIOLOGY | Facility: HOSPITAL | Age: 89
Discharge: HOME | End: 2024-02-09
Payer: MEDICARE

## 2024-02-09 ENCOUNTER — OFFICE VISIT (OUTPATIENT)
Dept: PRIMARY CARE | Facility: CLINIC | Age: 89
End: 2024-02-09
Payer: MEDICARE

## 2024-02-09 VITALS
HEART RATE: 88 BPM | WEIGHT: 156 LBS | BODY MASS INDEX: 21.76 KG/M2 | OXYGEN SATURATION: 92 % | DIASTOLIC BLOOD PRESSURE: 62 MMHG | SYSTOLIC BLOOD PRESSURE: 104 MMHG | TEMPERATURE: 98.3 F

## 2024-02-09 DIAGNOSIS — R05.9 COUGH, UNSPECIFIED TYPE: Primary | ICD-10-CM

## 2024-02-09 DIAGNOSIS — J44.1 COPD EXACERBATION (MULTI): ICD-10-CM

## 2024-02-09 DIAGNOSIS — R05.9 COUGH, UNSPECIFIED TYPE: ICD-10-CM

## 2024-02-09 PROCEDURE — 1126F AMNT PAIN NOTED NONE PRSNT: CPT | Performed by: FAMILY MEDICINE

## 2024-02-09 PROCEDURE — 99214 OFFICE O/P EST MOD 30 MIN: CPT | Performed by: FAMILY MEDICINE

## 2024-02-09 PROCEDURE — 1036F TOBACCO NON-USER: CPT | Performed by: FAMILY MEDICINE

## 2024-02-09 PROCEDURE — 71046 X-RAY EXAM CHEST 2 VIEWS: CPT | Performed by: RADIOLOGY

## 2024-02-09 PROCEDURE — 71046 X-RAY EXAM CHEST 2 VIEWS: CPT

## 2024-02-09 PROCEDURE — 1159F MED LIST DOCD IN RCRD: CPT | Performed by: FAMILY MEDICINE

## 2024-02-09 PROCEDURE — 3074F SYST BP LT 130 MM HG: CPT | Performed by: FAMILY MEDICINE

## 2024-02-09 PROCEDURE — 3078F DIAST BP <80 MM HG: CPT | Performed by: FAMILY MEDICINE

## 2024-02-09 RX ORDER — FLUTICASONE PROPIONATE 110 UG/1
1 AEROSOL, METERED RESPIRATORY (INHALATION)
Qty: 12 G | Refills: 5 | Status: SHIPPED | OUTPATIENT
Start: 2024-02-09 | End: 2024-02-09

## 2024-02-09 RX ORDER — FLUTICASONE FUROATE 100 UG/1
1 POWDER RESPIRATORY (INHALATION) DAILY
Qty: 30 EACH | Refills: 3 | Status: SHIPPED | OUTPATIENT
Start: 2024-02-09

## 2024-02-09 NOTE — PROGRESS NOTES
Subjective   Patient ID: Radhames Ken is a 89 y.o. male who presents for Wheezing (ONGOING - STAYING THE SAME.  USING NEBULIZER WHICH DOES HELP.  WORSE WITH ACTIVITY.).    HPI PATIENT HAS CHRONIC CHF AND VALVULAR HEART DISEASE /AORTIC STENOSIS AND JUST SAW CARDIOLOGY   COPD   JUST IN HOSPITAL WITH PNEUMONIA /12-23   HE IS STILL WORSE SOB AND COUGHING     Review of Systems   Constitutional:  Negative for activity change, appetite change, fever and unexpected weight change.   HENT:  Negative for congestion, ear pain, postnasal drip, rhinorrhea, sinus pressure and sore throat.    Eyes:  Negative for discharge, itching and visual disturbance.   Respiratory:  Positive for cough and shortness of breath. Negative for wheezing.    Cardiovascular:  Negative for chest pain, palpitations and leg swelling.   Gastrointestinal:  Negative for abdominal pain, blood in stool, constipation, diarrhea, nausea and vomiting.   Endocrine: Negative for cold intolerance, heat intolerance and polyuria.   Genitourinary:  Negative for dysuria, flank pain and hematuria.   Musculoskeletal:  Negative for arthralgias, gait problem, joint swelling and myalgias.   Skin:  Negative for rash.   Allergic/Immunologic: Negative for environmental allergies and food allergies.   Neurological:  Negative for dizziness, syncope, weakness, light-headedness, numbness and headaches.   Psychiatric/Behavioral:  Negative for dysphoric mood and sleep disturbance. The patient is not nervous/anxious.        Objective   /62   Pulse 88   Temp 36.8 °C (98.3 °F)   Wt 70.8 kg (156 lb)   SpO2 92%   BMI 21.76 kg/m²     Physical Exam  Vitals and nursing note reviewed.   Constitutional:       Appearance: Normal appearance.   HENT:      Head: Normocephalic.   Cardiovascular:      Rate and Rhythm: Normal rate and regular  rhythm.      Pulses: Normal pulses.      Heart sounds: Murmur heard.      No friction rub. No gallop.      Comments: LOUD 4/6 HOLOSYSTOLIC MURMUR LSB   Pulmonary:      Effort: No respiratory distress.      Breath sounds: Wheezing, rhonchi and rales present.   Abdominal:      General: Bowel sounds are normal. There is no distension.      Palpations: Abdomen is soft.      Tenderness: There is no abdominal tenderness.   Musculoskeletal:         General: No deformity. Normal range of motion.   Skin:     General: Skin is warm and dry.      Capillary Refill: Capillary refill takes less than 2 seconds.   Neurological:      General: No focal deficit present.      Mental Status: He is alert and oriented to person, place, and time.   Psychiatric:         Mood and Affect: Mood normal.         Assessment/Plan   Diagnoses and all orders for this visit:  Cough, unspecified type  -     XR chest 2 views; Future  -     fluticasone (Flovent) 110 mcg/actuation inhaler; Inhale 1 puff 2 times a day. Rinse mouth with water after use to reduce aftertaste and incidence of candidiasis. Do not swallow.  COPD exacerbation (CMS/[facility])  -     XR chest 2 views; Future  -     fluticasone (Flovent) 110 mcg/actuation inhaler; Inhale 1 puff 2 times a day. Rinse mouth with water after use to reduce aftertaste and incidence of candidiasis. Do not swallow.

## 2024-02-12 ENCOUNTER — TELEPHONE (OUTPATIENT)
Dept: PRIMARY CARE | Facility: CLINIC | Age: 89
End: 2024-02-12
Payer: MEDICARE

## 2024-02-12 NOTE — TELEPHONE ENCOUNTER
----- Message from Keily Sage DO sent at 2/12/2024  8:28 AM EST -----  HE SHOWS UNCHANGED EVIDENCE OF CHRONIC LUNG DISEASE/NO PNEUMONIA OR CHF THIS TIME

## 2024-02-12 NOTE — LETTER
February 21, 2024     Radhamesterri Burdenonald    Patient: Radhames Ken   YOB: 1933   Date of Visit: 2/12/2024       Dear Radhames Ken:    I have been trying to reach out to you regarding the medication (Mucinex) questions you had.  You should take the Mucinex twice daily. One in the morning and one in the afternoon.    If you have any further questions please feel free to reach out.    Sincerely,     Misti Minor MA      CC: No Recipients  ______________________________________________________________________________________    TWICE DAILY AND NO BEST TIME JUST AM AND PM 12 HOURS APART

## 2024-02-12 NOTE — TELEPHONE ENCOUNTER
Spoke with Elisabeth and she is aware od Brendanterris results.  She would like to know if there is anything she should be giving him.  She also mentions buying Mucinex for him, and she wants to know when is the best time for him to take it.  Please advise.

## 2024-02-15 ASSESSMENT — ENCOUNTER SYMPTOMS
WHEEZING: 0
LIGHT-HEADEDNESS: 0
BLOOD IN STOOL: 0
DIARRHEA: 0
JOINT SWELLING: 0
SINUS PRESSURE: 0
SORE THROAT: 0
DYSURIA: 0
CONSTIPATION: 0
RHINORRHEA: 0
PALPITATIONS: 0
HEMATURIA: 0
ACTIVITY CHANGE: 0
SLEEP DISTURBANCE: 0
FEVER: 0
WEAKNESS: 0
SHORTNESS OF BREATH: 1
MYALGIAS: 0
EYE ITCHING: 0
DYSPHORIC MOOD: 0
ARTHRALGIAS: 0
VOMITING: 0
ABDOMINAL PAIN: 0
EYE DISCHARGE: 0
UNEXPECTED WEIGHT CHANGE: 0
NUMBNESS: 0
DIZZINESS: 0
NAUSEA: 0
HEADACHES: 0
NERVOUS/ANXIOUS: 0
COUGH: 1
FLANK PAIN: 0
APPETITE CHANGE: 0

## 2024-02-17 DIAGNOSIS — J18.9 PNEUMONIA OF BOTH LOWER LOBES DUE TO INFECTIOUS ORGANISM: ICD-10-CM

## 2024-02-19 RX ORDER — IPRATROPIUM BROMIDE AND ALBUTEROL SULFATE 2.5; .5 MG/3ML; MG/3ML
3 SOLUTION RESPIRATORY (INHALATION)
Qty: 360 ML | Refills: 0 | Status: SHIPPED | OUTPATIENT
Start: 2024-02-19 | End: 2024-04-23

## 2024-03-11 ENCOUNTER — OFFICE VISIT (OUTPATIENT)
Dept: PULMONOLOGY | Facility: CLINIC | Age: 89
End: 2024-03-11
Payer: MEDICARE

## 2024-03-11 VITALS
BODY MASS INDEX: 22.06 KG/M2 | SYSTOLIC BLOOD PRESSURE: 105 MMHG | WEIGHT: 158.2 LBS | DIASTOLIC BLOOD PRESSURE: 58 MMHG | OXYGEN SATURATION: 93 % | HEART RATE: 67 BPM

## 2024-03-11 DIAGNOSIS — R06.02 SOB (SHORTNESS OF BREATH): ICD-10-CM

## 2024-03-11 DIAGNOSIS — Z01.811 PREOP PULMONARY/RESPIRATORY EXAM: Primary | ICD-10-CM

## 2024-03-11 PROCEDURE — 3078F DIAST BP <80 MM HG: CPT | Performed by: NURSE PRACTITIONER

## 2024-03-11 PROCEDURE — 1160F RVW MEDS BY RX/DR IN RCRD: CPT | Performed by: NURSE PRACTITIONER

## 2024-03-11 PROCEDURE — 3074F SYST BP LT 130 MM HG: CPT | Performed by: NURSE PRACTITIONER

## 2024-03-11 PROCEDURE — 1159F MED LIST DOCD IN RCRD: CPT | Performed by: NURSE PRACTITIONER

## 2024-03-11 PROCEDURE — 1036F TOBACCO NON-USER: CPT | Performed by: NURSE PRACTITIONER

## 2024-03-11 PROCEDURE — 99204 OFFICE O/P NEW MOD 45 MIN: CPT | Performed by: NURSE PRACTITIONER

## 2024-03-11 PROCEDURE — 1126F AMNT PAIN NOTED NONE PRSNT: CPT | Performed by: NURSE PRACTITIONER

## 2024-03-11 RX ORDER — EPLERENONE 25 MG/1
25 TABLET, FILM COATED ORAL DAILY
COMMUNITY

## 2024-03-11 RX ORDER — ALBUTEROL SULFATE 90 UG/1
2 AEROSOL, METERED RESPIRATORY (INHALATION) EVERY 4 HOURS PRN
Qty: 18 G | Refills: 11 | Status: SHIPPED | OUTPATIENT
Start: 2024-03-11

## 2024-03-11 ASSESSMENT — ENCOUNTER SYMPTOMS: SHORTNESS OF BREATH: 1

## 2024-03-11 NOTE — PROGRESS NOTES
Subjective   Patient ID: Radhames Ken is a 89 y.o. male who presents for No chief complaint on file..  HPI  New patient here today to establish care.  Patient will be having a TAVR and needs a pulmonary workup before surgery.  Patient has no history of asthma or COPD.  He only gets short of breath when he is fluid overloaded.  He is on Arnuity but recently was sick.  He has a nebulizer at home that he says helps and he does use it on occasion.  Patient will need a PFT before clearance    Review of Systems   Respiratory:  Positive for shortness of breath.    All other systems reviewed and are negative.      Objective   Physical Exam  Vitals and nursing note reviewed.   Constitutional:       Appearance: Normal appearance. He is normal weight.   HENT:      Head: Normocephalic.      Nose: Nose normal.   Eyes:      Pupils: Pupils are equal, round, and reactive to light.   Pulmonary:      Effort: Pulmonary effort is normal.      Breath sounds: Rhonchi present.   Neurological:      General: No focal deficit present.      Mental Status: He is alert and oriented to person, place, and time.   Psychiatric:         Mood and Affect: Mood normal.         Behavior: Behavior normal.         Thought Content: Thought content normal.         Assessment/Plan     # SOB  Never smoker  With wheezing and coughing  PNA 12/22/23 with hospitalization   Acute bronchitis treated on 2/16/24  He will need a PFT prior to surgery  You can continue with your Arnuity and your albuterol as needed    # Possible TAVR   - EF 5-40%   - He is being evaluated for surgery   - His MV is leaking also      Mr. Ken it was a pleasure seeing you in the office today. We discussed the following:    I am ordering you a breathing test, please call to schedule this    Follow up after your breathing test so we can clear you for your upcoming surgery      VELMA Mcleod-RAYO 03/11/24 1:16 PM

## 2024-03-11 NOTE — PATIENT INSTRUCTIONS
Mr. Ken it was a pleasure seeing you in the office today. We discussed the following:    I am ordering you a breathing test, please call to schedule this    Follow up after your breathing test so we can clear you for your upcoming surgery

## 2024-03-25 ENCOUNTER — TELEPHONE (OUTPATIENT)
Dept: PRIMARY CARE | Facility: CLINIC | Age: 89
End: 2024-03-25
Payer: MEDICARE

## 2024-03-25 NOTE — TELEPHONE ENCOUNTER
Transition of Care    Inpatient facility: [facility]  Discharge diagnosis: AORTIC VALVE STENOSIS, NONRHEUMATIC MITRAL VALVE REGURGITATION  Discharged to: HOME  Discharge date: 3/21/24  Initial Call date: 3/25/24  Spoke with patient/caregiver: LEFT MESSAGE FOR WIFE TO CALL BACK

## 2024-04-09 ENCOUNTER — OFFICE VISIT (OUTPATIENT)
Dept: PULMONOLOGY | Facility: CLINIC | Age: 89
End: 2024-04-09
Payer: MEDICARE

## 2024-04-09 VITALS
BODY MASS INDEX: 22.12 KG/M2 | WEIGHT: 158.6 LBS | SYSTOLIC BLOOD PRESSURE: 125 MMHG | DIASTOLIC BLOOD PRESSURE: 70 MMHG | OXYGEN SATURATION: 96 % | HEART RATE: 83 BPM

## 2024-04-09 DIAGNOSIS — C44.91 BASAL CELL ADENOCARCINOMA: Primary | ICD-10-CM

## 2024-04-09 DIAGNOSIS — R06.02 SOB (SHORTNESS OF BREATH): ICD-10-CM

## 2024-04-09 DIAGNOSIS — J44.1 COPD EXACERBATION (MULTI): ICD-10-CM

## 2024-04-09 PROCEDURE — 1160F RVW MEDS BY RX/DR IN RCRD: CPT | Performed by: NURSE PRACTITIONER

## 2024-04-09 PROCEDURE — 1159F MED LIST DOCD IN RCRD: CPT | Performed by: NURSE PRACTITIONER

## 2024-04-09 PROCEDURE — 99215 OFFICE O/P EST HI 40 MIN: CPT | Performed by: NURSE PRACTITIONER

## 2024-04-09 PROCEDURE — 3078F DIAST BP <80 MM HG: CPT | Performed by: NURSE PRACTITIONER

## 2024-04-09 PROCEDURE — 1036F TOBACCO NON-USER: CPT | Performed by: NURSE PRACTITIONER

## 2024-04-09 PROCEDURE — 3074F SYST BP LT 130 MM HG: CPT | Performed by: NURSE PRACTITIONER

## 2024-04-09 RX ORDER — GUAIFENESIN 600 MG/1
600 TABLET, EXTENDED RELEASE ORAL 2 TIMES DAILY
Qty: 60 TABLET | Refills: 11 | Status: SHIPPED | OUTPATIENT
Start: 2024-04-09 | End: 2025-04-09

## 2024-04-09 RX ORDER — AZITHROMYCIN 250 MG/1
TABLET, FILM COATED ORAL
Qty: 6 TABLET | Refills: 0 | Status: SHIPPED | OUTPATIENT
Start: 2024-04-09 | End: 2024-04-14

## 2024-04-09 ASSESSMENT — ENCOUNTER SYMPTOMS
COUGH: 1
SHORTNESS OF BREATH: 1

## 2024-04-09 NOTE — PATIENT INSTRUCTIONS
Mr. Ken it was a pleasure seeing you in the office today. We discussed the following:    You can continue your albuterol   I am sending in some medication for you  Continue to see cardiology     Follow up in 6 months

## 2024-04-09 NOTE — PROGRESS NOTES
Subjective   Patient ID: Radhames Ken is a 89 y.o. male who presents for No chief complaint on file..  HPI  New patient here today to establish care.  Patient will be having a TAVR and needs a pulmonary workup before surgery.  Patient has no history of asthma or COPD.  He only gets short of breath when he is fluid overloaded.  He is on Arnuity but recently was sick.  He has a nebulizer at home that he says helps and he does use it on occasion.  Patient will need a PFT before clearance  04/09/24 he is here today for follow-up.  He had his TAVR and did well.  He did not have his MV replaced.  He is fluid overloaded but his BNP is trending down since surgery.  I encouraged him to keep his legs elevated when at home.  He is using his albuterol.  He has a cough and finds it very difficult to bring up mucus.  He does have some rhonchi in his right lower lobe.    Review of Systems   Respiratory:  Positive for cough and shortness of breath.    All other systems reviewed and are negative.      Objective   Physical Exam  Vitals and nursing note reviewed.   Constitutional:       Appearance: Normal appearance. He is normal weight.   HENT:      Head: Normocephalic.      Nose: Nose normal.   Eyes:      Pupils: Pupils are equal, round, and reactive to light.   Cardiovascular:      Rate and Rhythm: Normal rate.   Pulmonary:      Effort: Pulmonary effort is normal.      Breath sounds: Rhonchi present.   Neurological:      General: No focal deficit present.      Mental Status: He is alert and oriented to person, place, and time.   Psychiatric:         Mood and Affect: Mood normal.         Behavior: Behavior normal.         Thought Content: Thought content normal.         Assessment/Plan     # SOB  Never smoker  With wheezing and coughing  PNA 12/22/23 with hospitalization   Acute bronchitis treated on 2/16/24  He will need a PFT prior to surgery  You can continue with your Arnuity and your albuterol as needed  04/09/24 he is not  feeling well today.  BNP was 1,355 on 3/29 but is trending down from surgery.He has a cough productive of sputum and feels congested. Will give him a zpack and mucinex      # Possible TAVR   - EF 5-40%   - He is being evaluated for surgery   - His MV is leaking also   04/09/24 he had the TAVR surgery. They did not replace the MV      Mr. Ken it was a pleasure seeing you in the office today. We discussed the following:    You can continue your albuterol   I am sending in some medication for you  Continue to see cardiology     Follow up in 6 months   VELMA Mcleod-CNP 04/09/24 12:34 PM

## 2024-04-11 ENCOUNTER — APPOINTMENT (OUTPATIENT)
Dept: PULMONOLOGY | Facility: CLINIC | Age: 89
End: 2024-04-11
Payer: MEDICARE

## 2024-04-12 ENCOUNTER — TELEPHONE (OUTPATIENT)
Dept: PRIMARY CARE | Facility: CLINIC | Age: 89
End: 2024-04-12
Payer: MEDICARE

## 2024-04-12 NOTE — TELEPHONE ENCOUNTER
Requesting carmel to be called into [facility]   His daughter called stating he had heart surgery in mid-march and pulmonologist recommended it.  Cardiology was called and they told them to contact us.

## 2024-04-15 DIAGNOSIS — Z29.9 NEED FOR PROPHYLACTIC MEASURE: Primary | ICD-10-CM

## 2024-04-23 ENCOUNTER — TELEPHONE (OUTPATIENT)
Dept: PULMONOLOGY | Facility: CLINIC | Age: 89
End: 2024-04-23

## 2024-04-23 DIAGNOSIS — I10 ESSENTIAL (PRIMARY) HYPERTENSION: ICD-10-CM

## 2024-04-23 DIAGNOSIS — J44.1 COPD EXACERBATION (MULTI): Primary | ICD-10-CM

## 2024-04-23 DIAGNOSIS — J18.9 PNEUMONIA OF BOTH LOWER LOBES DUE TO INFECTIOUS ORGANISM: ICD-10-CM

## 2024-04-23 RX ORDER — IPRATROPIUM BROMIDE AND ALBUTEROL SULFATE 2.5; .5 MG/3ML; MG/3ML
3 SOLUTION RESPIRATORY (INHALATION)
Qty: 360 ML | Refills: 0 | Status: SHIPPED | OUTPATIENT
Start: 2024-04-23 | End: 2024-05-22

## 2024-04-23 NOTE — TELEPHONE ENCOUNTER
Pts wife called and said that when Radhames does his inhalers and neb txs he doesn't stop coughing and coughs all night Elisabeth would like you to call her she is really concerned about  and worried about pneumonia she can be reached at [phone]  Thank you!

## 2024-04-24 ENCOUNTER — TELEPHONE (OUTPATIENT)
Dept: PRIMARY CARE | Facility: CLINIC | Age: 89
End: 2024-04-24

## 2024-04-24 ENCOUNTER — APPOINTMENT (OUTPATIENT)
Dept: PRIMARY CARE | Facility: CLINIC | Age: 89
End: 2024-04-24
Payer: MEDICARE

## 2024-04-24 ENCOUNTER — TELEPHONE (OUTPATIENT)
Dept: PULMONOLOGY | Facility: CLINIC | Age: 89
End: 2024-04-24

## 2024-04-24 DIAGNOSIS — R60.0 BILATERAL LEG EDEMA: Primary | ICD-10-CM

## 2024-04-24 RX ORDER — LISINOPRIL 10 MG/1
10 TABLET ORAL 2 TIMES DAILY
Qty: 180 TABLET | Refills: 2 | Status: SHIPPED | OUTPATIENT
Start: 2024-04-24

## 2024-04-24 RX ORDER — DOXYCYCLINE HYCLATE 100 MG
100 TABLET ORAL 2 TIMES DAILY
Qty: 20 TABLET | Refills: 0 | Status: SHIPPED | OUTPATIENT
Start: 2024-04-24 | End: 2024-05-04

## 2024-04-24 NOTE — TELEPHONE ENCOUNTER
[facility] in [place] called stating they need a new order for compression stockings because the one they received does not specify if these are to be knee high or thigh high and they need the size as well.

## 2024-04-24 NOTE — TELEPHONE ENCOUNTER
Spoke to Dtr Chasity.  Dr Sage is aware of the coughing and wheezing at night.  Dr. Sage is aware that he has an appointment next week.  Advised Chasity that if symptoms get worse to go to the ER

## 2024-04-24 NOTE — TELEPHONE ENCOUNTER
Spoke to the Dtr  Chasity she is still concerned about his coughing at night  and wheezing. Do you want to see him in the office today?  They are not getting much sleep  at this point.

## 2024-04-24 NOTE — TELEPHONE ENCOUNTER
Coughing a lot, been using over the counter medication  Wheezing  Very little mucus is any- unknown color    Been going on for months off and on    Had a aortic valve replaced couple months ago and the cough hasn't gone away    Just saw you 2wks ago    [facility]

## 2024-04-24 NOTE — TELEPHONE ENCOUNTER
I sent in 10 days of an antibiotic if he is not feeling better after that, he needs to see Dr. Sage. It could be heart failure also, that can make you cough

## 2024-04-29 ASSESSMENT — ENCOUNTER SYMPTOMS
WHEEZING: 1
COUGH: 1
RHINORRHEA: 1
SHORTNESS OF BREATH: 1

## 2024-04-30 ENCOUNTER — OFFICE VISIT (OUTPATIENT)
Dept: PRIMARY CARE | Facility: CLINIC | Age: 89
End: 2024-04-30
Payer: MEDICARE

## 2024-04-30 VITALS
OXYGEN SATURATION: 92 % | BODY MASS INDEX: 21.9 KG/M2 | SYSTOLIC BLOOD PRESSURE: 140 MMHG | HEART RATE: 74 BPM | DIASTOLIC BLOOD PRESSURE: 74 MMHG | TEMPERATURE: 98.2 F | WEIGHT: 157 LBS

## 2024-04-30 DIAGNOSIS — R06.02 SOB (SHORTNESS OF BREATH): ICD-10-CM

## 2024-04-30 DIAGNOSIS — I50.9 CONGESTIVE HEART FAILURE, UNSPECIFIED HF CHRONICITY, UNSPECIFIED HEART FAILURE TYPE (MULTI): ICD-10-CM

## 2024-04-30 DIAGNOSIS — J43.9 PULMONARY EMPHYSEMA, UNSPECIFIED EMPHYSEMA TYPE (MULTI): Primary | ICD-10-CM

## 2024-04-30 PROCEDURE — 3077F SYST BP >= 140 MM HG: CPT | Performed by: FAMILY MEDICINE

## 2024-04-30 PROCEDURE — 1036F TOBACCO NON-USER: CPT | Performed by: FAMILY MEDICINE

## 2024-04-30 PROCEDURE — 3078F DIAST BP <80 MM HG: CPT | Performed by: FAMILY MEDICINE

## 2024-04-30 PROCEDURE — 99214 OFFICE O/P EST MOD 30 MIN: CPT | Performed by: FAMILY MEDICINE

## 2024-04-30 PROCEDURE — 1159F MED LIST DOCD IN RCRD: CPT | Performed by: FAMILY MEDICINE

## 2024-04-30 PROCEDURE — 1160F RVW MEDS BY RX/DR IN RCRD: CPT | Performed by: FAMILY MEDICINE

## 2024-04-30 ASSESSMENT — ENCOUNTER SYMPTOMS
SORE THROAT: 0
BLOOD IN STOOL: 0
UNEXPECTED WEIGHT CHANGE: 0
NAUSEA: 0
NERVOUS/ANXIOUS: 0
SHORTNESS OF BREATH: 1
ABDOMINAL PAIN: 0
COUGH: 1
PALPITATIONS: 0
ACTIVITY CHANGE: 0
FLANK PAIN: 0
DYSPHORIC MOOD: 0
FEVER: 0
JOINT SWELLING: 0
HEADACHES: 0
MYALGIAS: 0
ARTHRALGIAS: 0
SLEEP DISTURBANCE: 0
EYE DISCHARGE: 0
NUMBNESS: 0
DYSURIA: 0
LIGHT-HEADEDNESS: 0
SINUS PRESSURE: 0
CONSTIPATION: 0
VOMITING: 0
EYE ITCHING: 0
WEAKNESS: 0
HEMATURIA: 0
WHEEZING: 1
DIARRHEA: 0
RHINORRHEA: 1
APPETITE CHANGE: 0
DIZZINESS: 0

## 2024-04-30 NOTE — PATIENT INSTRUCTIONS
STEROID ONCE DAILY   NEBULIZER EVERY 4 HOURS   DISCUSSED THE COUGH SYRUP AT BEDTIME  SEE CARDIOLOGY SOON

## 2024-04-30 NOTE — PROGRESS NOTES
Subjective   Patient ID: Radhames Ken is a 89 y.o. male who presents for Cough (IMPROVING OVER THE LAST 2 DAYS.) and Follow-up (Daughter- Wiliam and Son in law-Alfonso is present with Radhames elliott Elisabeth today./They would like to discuss medications-  Wiliam mentions getting mixed messages regarding what he is taking.).    Cough  This is a chronic problem. The current episode started more than 1 month ago. The problem has been waxing and waning. The problem occurs every few hours. The cough is Productive of sputum. Associated symptoms include rhinorrhea, shortness of breath and wheezing. Pertinent negatives include no chest pain, ear pain, fever, headaches, myalgias, postnasal drip, rash or sore throat. There is no history of environmental allergies.    CARDIAC COUGH   SEES CARDIOLOGISTS SOON  MAJOR NEBULIZER AM AND PM AND IF NEEDED   COUGHS WITH EXERTION   CHRONIC PHLEGM PRODUCTION   HE IS ON DOXYCYCLINE FROM PULMONOLOGY       Review of Systems   Constitutional:  Negative for activity change, appetite change, fever and unexpected weight change.   HENT:  Positive for hearing loss and rhinorrhea. Negative for congestion, ear pain, postnasal drip, sinus pressure and sore throat.    Eyes:  Negative for discharge, itching and visual disturbance.   Respiratory:  Positive for cough, shortness of breath and wheezing.    Cardiovascular:  Positive for leg swelling. Negative for chest pain and palpitations.   Gastrointestinal:  Negative for abdominal pain, blood in stool, constipation, diarrhea, nausea and vomiting.   Endocrine: Negative for cold intolerance, heat intolerance and polyuria.   Genitourinary:  Negative for dysuria, flank pain and hematuria.   Musculoskeletal:  Negative for arthralgias, gait problem, joint swelling and myalgias.   Skin:  Negative for rash.   Allergic/Immunologic: Negative for environmental allergies and food allergies.   Neurological:  Negative for dizziness, syncope, weakness, light-headedness,  numbness and headaches.   Psychiatric/Behavioral:  Negative for dysphoric mood and sleep disturbance. The patient is not nervous/anxious.        Objective   /74   Pulse 74   Temp 36.8 °C (98.2 °F)   Wt 71.2 kg (157 lb)   SpO2 92%   BMI 21.90 kg/m²     Physical Exam  Vitals and nursing note reviewed.   Constitutional:       Appearance: Normal appearance.   HENT:      Head: Normocephalic.      Ears:      Comments: DEAF  Cardiovascular:      Rate and Rhythm: Normal rate and regular rhythm.      Pulses: Normal pulses.      Heart sounds: Normal heart sounds. No murmur heard.     No friction rub. No gallop.   Pulmonary:      Effort: Respiratory distress present.      Breath sounds: Wheezing, rhonchi and rales present.   Abdominal:      General: Bowel sounds are normal. There is no distension.      Palpations: Abdomen is soft.      Tenderness: There is no abdominal tenderness.   Musculoskeletal:         General: No deformity. Normal range of motion.      Right lower leg: Edema present.      Left lower leg: Edema present.      Comments: +2 PTE   NO CALF PAIN OR SWELLING    Skin:     General: Skin is warm and dry.      Capillary Refill: Capillary refill takes less than 2 seconds.   Neurological:      General: No focal deficit present.      Mental Status: He is alert. Mental status is at baseline.   Psychiatric:         Mood and Affect: Mood normal.         Assessment/Plan   Problem List Items Addressed This Visit             ICD-10-CM    SOB (shortness of breath) R06.02    Pulmonary emphysema (Multi) - Primary J43.9     Other Visit Diagnoses         Codes    Congestive heart failure, unspecified HF chronicity, unspecified heart failure type (Multi)     I50.9

## 2024-05-21 DIAGNOSIS — J18.9 PNEUMONIA OF BOTH LOWER LOBES DUE TO INFECTIOUS ORGANISM: ICD-10-CM

## 2024-05-22 ENCOUNTER — OFFICE VISIT (OUTPATIENT)
Dept: PRIMARY CARE | Facility: CLINIC | Age: 89
End: 2024-05-22
Payer: MEDICARE

## 2024-05-22 VITALS
HEART RATE: 79 BPM | DIASTOLIC BLOOD PRESSURE: 52 MMHG | BODY MASS INDEX: 21.76 KG/M2 | SYSTOLIC BLOOD PRESSURE: 90 MMHG | OXYGEN SATURATION: 97 % | WEIGHT: 156 LBS | TEMPERATURE: 98.7 F

## 2024-05-22 DIAGNOSIS — I50.9 CONGESTIVE HEART FAILURE, UNSPECIFIED HF CHRONICITY, UNSPECIFIED HEART FAILURE TYPE (MULTI): ICD-10-CM

## 2024-05-22 DIAGNOSIS — J43.9 PULMONARY EMPHYSEMA, UNSPECIFIED EMPHYSEMA TYPE (MULTI): ICD-10-CM

## 2024-05-22 DIAGNOSIS — I10 PRIMARY HYPERTENSION: Primary | ICD-10-CM

## 2024-05-22 PROCEDURE — 99214 OFFICE O/P EST MOD 30 MIN: CPT | Performed by: FAMILY MEDICINE

## 2024-05-22 PROCEDURE — 1159F MED LIST DOCD IN RCRD: CPT | Performed by: FAMILY MEDICINE

## 2024-05-22 PROCEDURE — 3074F SYST BP LT 130 MM HG: CPT | Performed by: FAMILY MEDICINE

## 2024-05-22 PROCEDURE — 3078F DIAST BP <80 MM HG: CPT | Performed by: FAMILY MEDICINE

## 2024-05-22 PROCEDURE — 1160F RVW MEDS BY RX/DR IN RCRD: CPT | Performed by: FAMILY MEDICINE

## 2024-05-22 RX ORDER — IPRATROPIUM BROMIDE AND ALBUTEROL SULFATE 2.5; .5 MG/3ML; MG/3ML
3 SOLUTION RESPIRATORY (INHALATION)
Qty: 360 ML | Refills: 0 | Status: SHIPPED | OUTPATIENT
Start: 2024-05-22 | End: 2024-06-21

## 2024-05-22 RX ORDER — METOPROLOL SUCCINATE 25 MG/1
12.5 TABLET, EXTENDED RELEASE ORAL
COMMUNITY
Start: 2024-05-09

## 2024-05-22 NOTE — PROGRESS NOTES
Subjective   Patient ID: Radhames Ken is a 89 y.o. male who presents for NEBULIZER TREATMENT (X2 NIGHT- GOT SO DIZZY HE COULDN'T WALK.  ONLY TAKING IT AT NIGHT. WAS WONDERING IF THAT AND HIS OTHER MEDS ARE INTERACTING.), Hypotension (LOW- PER DR. OLIVARES OFFICE THEY SOUL BE CALLING WITH THESE SYMPTOMS.), Fatigue, and Leg Swelling (GOING TO THE DRUG STORE FOR FITTING.).    HPI NOT COUGHING LIKE HE WAS NOW AND DOES NOT WANT TO USE THE AEROSOL MACHINE WHICH IS FINE     Review of SystemsSINCE THE  VISIT NO INTERVAL HISTORICAL CHANGES IN ANY OF THE 12 SYSTEMS REVIEWED OTHER THAN CONTINUED RECOVERY FROM VALVE REPLACEMENT SURGERY ENDOSCOPICALLY AND MEDIAN STERNOTOMY SURGERY FOR THE OTHER VALVE   CHRONIC CHF AND AN ISCHEMIC STROKE AND COPD     Objective   BP 90/52   Pulse 79   Temp 37.1 °C (98.7 °F)   Wt 70.8 kg (156 lb)   SpO2 97%   BMI 21.76 kg/m²     Physical Exam  Vitals and nursing note reviewed.   Constitutional:       Appearance: Normal appearance.      Comments: COLOR IS BETTER MORE ALERT    HENT:      Head: Normocephalic.   Cardiovascular:      Rate and Rhythm: Normal rate and regular rhythm.      Pulses: Normal pulses.      Heart sounds: Normal heart sounds. No murmur heard.     No friction rub. No gallop.   Pulmonary:      Effort: Pulmonary effort is normal. No respiratory distress.      Breath sounds: Normal breath sounds. No wheezing.   Abdominal:      General: Bowel sounds are normal. There is no distension.      Palpations: Abdomen is soft.      Tenderness: There is no abdominal tenderness.   Musculoskeletal:         General: No deformity. Normal range of motion.   Skin:     General: Skin is warm and dry.      Capillary Refill: Capillary refill takes less than 2 seconds.   Neurological:      General: No focal deficit present.      Mental Status: He is alert and oriented to person, place, and time.   Psychiatric:         Mood and Affect: Mood normal.         Assessment/Plan   Problem List Items  Addressed This Visit             ICD-10-CM    HTN (hypertension) - Primary I10    Pulmonary emphysema (Multi) J43.9    Congestive heart failure (Multi) I50.9    Relevant Medications    metoprolol succinate XL (Toprol-XL) 25 mg 24 hr tablet

## 2024-05-22 NOTE — PATIENT INSTRUCTIONS
CHECK WITH THE CARDIOLOGIST ABOUT THE DIZZINESS     HOLD THE INHALERS   USE THE ALBUTEROL FOR RESCUE FOR WHEEZING

## 2024-05-29 PROBLEM — I50.9 CONGESTIVE HEART FAILURE (MULTI): Status: ACTIVE | Noted: 2024-05-29

## 2024-05-29 PROBLEM — A28.0 PASTEURELLA CELLULITIS DUE TO DOG BITE: Status: RESOLVED | Noted: 2023-09-21 | Resolved: 2024-05-29
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
## 2024-07-10 ENCOUNTER — APPOINTMENT (OUTPATIENT)
Dept: PULMONOLOGY | Facility: CLINIC | Age: 89
End: 2024-07-10
Payer: MEDICARE

## 2024-07-10 VITALS
OXYGEN SATURATION: 92 % | DIASTOLIC BLOOD PRESSURE: 73 MMHG | SYSTOLIC BLOOD PRESSURE: 114 MMHG | WEIGHT: 159 LBS | BODY MASS INDEX: 22.18 KG/M2 | HEART RATE: 80 BPM

## 2024-07-10 DIAGNOSIS — C44.91 BASAL CELL ADENOCARCINOMA: ICD-10-CM

## 2024-07-10 DIAGNOSIS — I50.9 CONGESTIVE HEART FAILURE, UNSPECIFIED HF CHRONICITY, UNSPECIFIED HEART FAILURE TYPE (MULTI): ICD-10-CM

## 2024-07-10 DIAGNOSIS — R06.02 SOB (SHORTNESS OF BREATH): Primary | ICD-10-CM

## 2024-07-10 PROCEDURE — 1159F MED LIST DOCD IN RCRD: CPT | Performed by: NURSE PRACTITIONER

## 2024-07-10 PROCEDURE — 99214 OFFICE O/P EST MOD 30 MIN: CPT | Performed by: NURSE PRACTITIONER

## 2024-07-10 PROCEDURE — 1160F RVW MEDS BY RX/DR IN RCRD: CPT | Performed by: NURSE PRACTITIONER

## 2024-07-10 PROCEDURE — 3074F SYST BP LT 130 MM HG: CPT | Performed by: NURSE PRACTITIONER

## 2024-07-10 PROCEDURE — 1036F TOBACCO NON-USER: CPT | Performed by: NURSE PRACTITIONER

## 2024-07-10 PROCEDURE — 3078F DIAST BP <80 MM HG: CPT | Performed by: NURSE PRACTITIONER

## 2024-07-10 RX ORDER — PREDNISONE 20 MG/1
20 TABLET ORAL DAILY
Qty: 7 TABLET | Refills: 0 | Status: SHIPPED | OUTPATIENT
Start: 2024-07-10 | End: 2024-07-17

## 2024-07-10 RX ORDER — ATORVASTATIN CALCIUM 40 MG/1
40 TABLET, FILM COATED ORAL DAILY
COMMUNITY

## 2024-07-10 RX ORDER — GUAIFENESIN 600 MG/1
600 TABLET, EXTENDED RELEASE ORAL 2 TIMES DAILY
Qty: 60 TABLET | Refills: 11 | Status: SHIPPED | OUTPATIENT
Start: 2024-07-10 | End: 2025-07-10

## 2024-07-10 RX ORDER — NAPROXEN SODIUM 220 MG/1
81 TABLET, FILM COATED ORAL DAILY
COMMUNITY

## 2024-07-10 ASSESSMENT — ENCOUNTER SYMPTOMS: WHEEZING: 1

## 2024-07-10 NOTE — PATIENT INSTRUCTIONS
Mr. Ken it was a pleasure seeing you in the office today. We discussed the following:    You can continue your albuterol   Continue to see cardiology     Follow-up as needed

## 2024-07-10 NOTE — PROGRESS NOTES
Subjective   Patient ID: Radhames Ken is a 89 y.o. male who presents for No chief complaint on file..  HPI  New patient here today to establish care.  Patient will be having a TAVR and needs a pulmonary workup before surgery.  Patient has no history of asthma or COPD.  He only gets short of breath when he is fluid overloaded.  He is on Arnuity but recently was sick.  He has a nebulizer at home that he says helps and he does use it on occasion.  Patient will need a PFT before clearance  04/09/24 he is here today for follow-up.  He had his TAVR and did well.  He did not have his MV replaced.  He is fluid overloaded but his BNP is trending down since surgery.  I encouraged him to keep his legs elevated when at home.  He is using his albuterol.  He has a cough and finds it very difficult to bring up mucus.  He does have some rhonchi in his right lower lobe.  07/10/24 He is here today for follow-up.  He has been doing well.  He has a slight wheeze I will send him in a couple days of prednisone.  He tells me he does great he has very little shortness of breath.  He is not using his Arnuity and has not needed his albuterol.    Review of Systems   Respiratory:  Positive for wheezing.    All other systems reviewed and are negative.      Objective   Physical Exam  Vitals and nursing note reviewed.   Constitutional:       Appearance: Normal appearance. He is normal weight.   HENT:      Head: Normocephalic.      Nose: Nose normal.   Eyes:      Pupils: Pupils are equal, round, and reactive to light.   Cardiovascular:      Rate and Rhythm: Normal rate.   Pulmonary:      Effort: Pulmonary effort is normal.      Breath sounds: Wheezing present.   Neurological:      General: No focal deficit present.      Mental Status: He is alert and oriented to person, place, and time.   Psychiatric:         Mood and Affect: Mood normal.         Behavior: Behavior normal.         Thought Content: Thought content normal.          Assessment/Plan   He is active, no complaints   He is using his nebulizer as needed not using arnuity     # SOB  Never smoker  With wheezing and coughing  PNA 12/22/23 with hospitalization   Acute bronchitis treated on 2/16/24  He will need a PFT prior to surgery  You can continue with your Arnuity and your albuterol as needed  04/09/24 he is not feeling well today.  BNP was 1,355 on 3/29 but is trending down from surgery.He has a cough productive of sputum and feels congested. Will give him a zpack and mucinex      # Possible TAVR   - EF 5-40%   - He is being evaluated for surgery   - His MV is leaking also   04/09/24 he had the TAVR surgery. They did not replace the MV      Mr. Ken it was a pleasure seeing you in the office today. We discussed the following:    You can continue your albuterol   Continue to see cardiology     Follow-up as needed       VELMA Mcleod-RAYO 07/10/24 12:41 PM

## 2024-08-22 ENCOUNTER — APPOINTMENT (OUTPATIENT)
Dept: PULMONOLOGY | Facility: CLINIC | Age: 89
End: 2024-08-22
Payer: MEDICARE

## 2024-08-22 VITALS
OXYGEN SATURATION: 97 % | HEART RATE: 77 BPM | WEIGHT: 163.6 LBS | DIASTOLIC BLOOD PRESSURE: 60 MMHG | BODY MASS INDEX: 22.82 KG/M2 | SYSTOLIC BLOOD PRESSURE: 133 MMHG

## 2024-08-22 DIAGNOSIS — R05.9 COUGH, UNSPECIFIED TYPE: ICD-10-CM

## 2024-08-22 DIAGNOSIS — I50.9 CONGESTIVE HEART FAILURE, UNSPECIFIED HF CHRONICITY, UNSPECIFIED HEART FAILURE TYPE (MULTI): Primary | ICD-10-CM

## 2024-08-22 PROCEDURE — 3075F SYST BP GE 130 - 139MM HG: CPT | Performed by: NURSE PRACTITIONER

## 2024-08-22 PROCEDURE — 1036F TOBACCO NON-USER: CPT | Performed by: NURSE PRACTITIONER

## 2024-08-22 PROCEDURE — 3078F DIAST BP <80 MM HG: CPT | Performed by: NURSE PRACTITIONER

## 2024-08-22 PROCEDURE — 1160F RVW MEDS BY RX/DR IN RCRD: CPT | Performed by: NURSE PRACTITIONER

## 2024-08-22 PROCEDURE — 1159F MED LIST DOCD IN RCRD: CPT | Performed by: NURSE PRACTITIONER

## 2024-08-22 PROCEDURE — 99214 OFFICE O/P EST MOD 30 MIN: CPT | Performed by: NURSE PRACTITIONER

## 2024-08-22 ASSESSMENT — ENCOUNTER SYMPTOMS: COUGH: 1

## 2024-08-22 NOTE — PROGRESS NOTES
Subjective   Patient ID: Radhames Ken is a 89 y.o. male who presents for No chief complaint on file..  HPI  New patient here today to establish care.  Patient will be having a TAVR and needs a pulmonary workup before surgery.  Patient has no history of asthma or COPD.  He only gets short of breath when he is fluid overloaded.  He is on Arnuity but recently was sick.  He has a nebulizer at home that he says helps and he does use it on occasion.  Patient will need a PFT before clearance  04/09/24 he is here today for follow-up.  He had his TAVR and did well.  He did not have his MV replaced.  He is fluid overloaded but his BNP is trending down since surgery.  I encouraged him to keep his legs elevated when at home.  He is using his albuterol.  He has a cough and finds it very difficult to bring up mucus.  He does have some rhonchi in his right lower lobe.  07/10/24 He is here today for follow-up.  He has been doing well.  He has a slight wheeze I will send him in a couple days of prednisone.  He tells me he does great he has very little shortness of breath.  He is not using his Arnuity and has not needed his albuterol.    08/22/24 he is here today with some shortness of breath.  He is coughing and has had some occasional wheezing.  He is also doing some throat clearing.  Sounds like some postnasal drip related to the weather changing.  Lungs are clear.  He does have some bilateral lower extremity edema.    Review of Systems   Respiratory:  Positive for cough.    All other systems reviewed and are negative.      Objective   Physical Exam  Vitals and nursing note reviewed.   Constitutional:       Appearance: Normal appearance. He is normal weight.   HENT:      Head: Normocephalic.      Nose: Nose normal.   Eyes:      Pupils: Pupils are equal, round, and reactive to light.   Cardiovascular:      Rate and Rhythm: Normal rate.   Pulmonary:      Effort: Pulmonary effort is normal.      Breath sounds: Normal breath  sounds.   Musculoskeletal:      Left lower leg: Edema present.   Neurological:      General: No focal deficit present.      Mental Status: He is alert and oriented to person, place, and time.   Psychiatric:         Mood and Affect: Mood normal.         Behavior: Behavior normal.         Thought Content: Thought content normal.         Assessment/Plan   He is active, no complaints   He is using his nebulizer as needed not using arnuity     # SOB  Never smoker  With wheezing and coughing  PNA 12/22/23 with hospitalization   Acute bronchitis treated on 2/16/24  He will need a PFT prior to surgery  You can continue with your Arnuity and your albuterol as needed  04/09/24 he is not feeling well today.  BNP was 1,355 on 3/29 but is trending down from surgery.He has a cough productive of sputum and feels congested. Will give him a zpack and mucinex    08/22/24 he has been more congested.  His wife has noticed an occasional wheeze with some coughing.  Sounds like postnasal drip.  Lungs are clear.  He is fluid overloaded bilaterally feet and ankles.    # Possible TAVR   - EF 5-40%   - He is being evaluated for surgery   - His MV is leaking also   04/09/24 he had the TAVR surgery. They did not replace the MV      Mr. Ken it was a pleasure seeing you in the office today. We discussed the following:    You can continue your albuterol   Continue to see cardiology     Follow-up as needed with Dr. Joya for your SOB       VELMA Mcleod-CNP 08/22/24 8:30 AM

## 2024-08-29 ENCOUNTER — TELEPHONE (OUTPATIENT)
Dept: PULMONOLOGY | Facility: CLINIC | Age: 89
End: 2024-08-29
Payer: MEDICARE

## 2024-08-29 NOTE — TELEPHONE ENCOUNTER
Pts daughter lmm wanting to know if the patient can stop taking sinus med karol said yes he can stop taking siinus med and to fu with his pcp and if his breathing gets worse go to er the patients daughter said that they will stop his sinus meds

## 2024-09-11 ENCOUNTER — OFFICE VISIT (OUTPATIENT)
Dept: PRIMARY CARE | Facility: CLINIC | Age: 89
End: 2024-09-11
Payer: MEDICARE

## 2024-09-11 ENCOUNTER — HOSPITAL ENCOUNTER (INPATIENT)
Facility: HOSPITAL | Age: 89
DRG: 176 | End: 2024-09-11
Attending: EMERGENCY MEDICINE | Admitting: INTERNAL MEDICINE
Payer: MEDICARE

## 2024-09-11 ENCOUNTER — APPOINTMENT (OUTPATIENT)
Dept: RADIOLOGY | Facility: HOSPITAL | Age: 89
DRG: 176 | End: 2024-09-11
Payer: MEDICARE

## 2024-09-11 ENCOUNTER — APPOINTMENT (OUTPATIENT)
Dept: CARDIOLOGY | Facility: HOSPITAL | Age: 89
DRG: 176 | End: 2024-09-11
Payer: MEDICARE

## 2024-09-11 VITALS
OXYGEN SATURATION: 96 % | BODY MASS INDEX: 22.45 KG/M2 | TEMPERATURE: 97.3 F | WEIGHT: 161 LBS | HEART RATE: 105 BPM | DIASTOLIC BLOOD PRESSURE: 60 MMHG | SYSTOLIC BLOOD PRESSURE: 102 MMHG

## 2024-09-11 DIAGNOSIS — R05.3 PERSISTENT COUGH: ICD-10-CM

## 2024-09-11 DIAGNOSIS — I26.99 ACUTE PULMONARY EMBOLISM, UNSPECIFIED PULMONARY EMBOLISM TYPE, UNSPECIFIED WHETHER ACUTE COR PULMONALE PRESENT (MULTI): ICD-10-CM

## 2024-09-11 DIAGNOSIS — I50.9 CONGESTIVE HEART FAILURE, UNSPECIFIED HF CHRONICITY, UNSPECIFIED HEART FAILURE TYPE: Primary | ICD-10-CM

## 2024-09-11 DIAGNOSIS — J18.9 PNEUMONIA OF BOTH LOWER LOBES DUE TO INFECTIOUS ORGANISM: ICD-10-CM

## 2024-09-11 DIAGNOSIS — R06.00 DYSPNEA, UNSPECIFIED TYPE: Primary | ICD-10-CM

## 2024-09-11 LAB
ALBUMIN SERPL BCP-MCNC: 3.9 G/DL (ref 3.4–5)
ALP SERPL-CCNC: 142 U/L (ref 33–136)
ALT SERPL W P-5'-P-CCNC: 16 U/L (ref 10–52)
ANION GAP SERPL CALC-SCNC: 12 MMOL/L (ref 10–20)
AST SERPL W P-5'-P-CCNC: 17 U/L (ref 9–39)
BASOPHILS # BLD AUTO: 0.12 X10*3/UL (ref 0–0.1)
BASOPHILS NFR BLD AUTO: 1.5 %
BILIRUB SERPL-MCNC: 1.7 MG/DL (ref 0–1.2)
BNP SERPL-MCNC: 215 PG/ML (ref 0–99)
BUN SERPL-MCNC: 20 MG/DL (ref 6–23)
CALCIUM SERPL-MCNC: 9 MG/DL (ref 8.6–10.3)
CARDIAC TROPONIN I PNL SERPL HS: 18 NG/L (ref 0–20)
CARDIAC TROPONIN I PNL SERPL HS: 20 NG/L (ref 0–20)
CHLORIDE SERPL-SCNC: 97 MMOL/L (ref 98–107)
CO2 SERPL-SCNC: 26 MMOL/L (ref 21–32)
CREAT SERPL-MCNC: 1.38 MG/DL (ref 0.5–1.3)
EGFRCR SERPLBLD CKD-EPI 2021: 49 ML/MIN/1.73M*2
EOSINOPHIL # BLD AUTO: 0.8 X10*3/UL (ref 0–0.4)
EOSINOPHIL NFR BLD AUTO: 9.7 %
ERYTHROCYTE [DISTWIDTH] IN BLOOD BY AUTOMATED COUNT: 13.5 % (ref 11.5–14.5)
FLUAV RNA RESP QL NAA+PROBE: NOT DETECTED
FLUBV RNA RESP QL NAA+PROBE: NOT DETECTED
GLUCOSE SERPL-MCNC: 109 MG/DL (ref 74–99)
HCT VFR BLD AUTO: 40.7 % (ref 41–52)
HGB BLD-MCNC: 13.6 G/DL (ref 13.5–17.5)
IMM GRANULOCYTES # BLD AUTO: 0.04 X10*3/UL (ref 0–0.5)
IMM GRANULOCYTES NFR BLD AUTO: 0.5 % (ref 0–0.9)
INR PPP: 1.1 (ref 0.9–1.1)
LACTATE SERPL-SCNC: 2.1 MMOL/L (ref 0.4–2)
LACTATE SERPL-SCNC: 2.2 MMOL/L (ref 0.4–2)
LYMPHOCYTES # BLD AUTO: 1 X10*3/UL (ref 0.8–3)
LYMPHOCYTES NFR BLD AUTO: 12.2 %
MCH RBC QN AUTO: 30.4 PG (ref 26–34)
MCHC RBC AUTO-ENTMCNC: 33.4 G/DL (ref 32–36)
MCV RBC AUTO: 91 FL (ref 80–100)
MONOCYTES # BLD AUTO: 0.51 X10*3/UL (ref 0.05–0.8)
MONOCYTES NFR BLD AUTO: 6.2 %
NEUTROPHILS # BLD AUTO: 5.75 X10*3/UL (ref 1.6–5.5)
NEUTROPHILS NFR BLD AUTO: 69.9 %
NRBC BLD-RTO: 0 /100 WBCS (ref 0–0)
PLATELET # BLD AUTO: 139 X10*3/UL (ref 150–450)
POTASSIUM SERPL-SCNC: 4.1 MMOL/L (ref 3.5–5.3)
PROT SERPL-MCNC: 6.1 G/DL (ref 6.4–8.2)
PROTHROMBIN TIME: 12.3 SECONDS (ref 9.8–12.8)
RBC # BLD AUTO: 4.47 X10*6/UL (ref 4.5–5.9)
RSV RNA RESP QL NAA+PROBE: NOT DETECTED
SARS-COV-2 RNA RESP QL NAA+PROBE: NOT DETECTED
SODIUM SERPL-SCNC: 131 MMOL/L (ref 136–145)
WBC # BLD AUTO: 8.2 X10*3/UL (ref 4.4–11.3)

## 2024-09-11 PROCEDURE — 85025 COMPLETE CBC W/AUTO DIFF WBC: CPT | Performed by: EMERGENCY MEDICINE

## 2024-09-11 PROCEDURE — 83880 ASSAY OF NATRIURETIC PEPTIDE: CPT | Performed by: EMERGENCY MEDICINE

## 2024-09-11 PROCEDURE — 93005 ELECTROCARDIOGRAM TRACING: CPT

## 2024-09-11 PROCEDURE — 36415 COLL VENOUS BLD VENIPUNCTURE: CPT | Performed by: EMERGENCY MEDICINE

## 2024-09-11 PROCEDURE — 96361 HYDRATE IV INFUSION ADD-ON: CPT

## 2024-09-11 PROCEDURE — 71046 X-RAY EXAM CHEST 2 VIEWS: CPT | Mod: FOREIGN READ | Performed by: RADIOLOGY

## 2024-09-11 PROCEDURE — 96372 THER/PROPH/DIAG INJ SC/IM: CPT | Performed by: NURSE PRACTITIONER

## 2024-09-11 PROCEDURE — 87637 SARSCOV2&INF A&B&RSV AMP PRB: CPT | Performed by: PHYSICIAN ASSISTANT

## 2024-09-11 PROCEDURE — G0378 HOSPITAL OBSERVATION PER HR: HCPCS

## 2024-09-11 PROCEDURE — 94664 DEMO&/EVAL PT USE INHALER: CPT

## 2024-09-11 PROCEDURE — 2500000004 HC RX 250 GENERAL PHARMACY W/ HCPCS (ALT 636 FOR OP/ED): Performed by: PHYSICIAN ASSISTANT

## 2024-09-11 PROCEDURE — 71250 CT THORAX DX C-: CPT | Mod: FOREIGN READ | Performed by: RADIOLOGY

## 2024-09-11 PROCEDURE — 71046 X-RAY EXAM CHEST 2 VIEWS: CPT

## 2024-09-11 PROCEDURE — 80053 COMPREHEN METABOLIC PANEL: CPT | Performed by: EMERGENCY MEDICINE

## 2024-09-11 PROCEDURE — 94760 N-INVAS EAR/PLS OXIMETRY 1: CPT

## 2024-09-11 PROCEDURE — 9420000001 HC RT PATIENT EDUCATION 5 MIN

## 2024-09-11 PROCEDURE — 2500000002 HC RX 250 W HCPCS SELF ADMINISTERED DRUGS (ALT 637 FOR MEDICARE OP, ALT 636 FOR OP/ED): Performed by: PHYSICIAN ASSISTANT

## 2024-09-11 PROCEDURE — 1159F MED LIST DOCD IN RCRD: CPT | Performed by: FAMILY MEDICINE

## 2024-09-11 PROCEDURE — 94640 AIRWAY INHALATION TREATMENT: CPT

## 2024-09-11 PROCEDURE — 2500000004 HC RX 250 GENERAL PHARMACY W/ HCPCS (ALT 636 FOR OP/ED): Performed by: NURSE PRACTITIONER

## 2024-09-11 PROCEDURE — 3074F SYST BP LT 130 MM HG: CPT | Performed by: FAMILY MEDICINE

## 2024-09-11 PROCEDURE — 71250 CT THORAX DX C-: CPT

## 2024-09-11 PROCEDURE — 96375 TX/PRO/DX INJ NEW DRUG ADDON: CPT

## 2024-09-11 PROCEDURE — 3078F DIAST BP <80 MM HG: CPT | Performed by: FAMILY MEDICINE

## 2024-09-11 PROCEDURE — 99285 EMERGENCY DEPT VISIT HI MDM: CPT | Mod: 25

## 2024-09-11 PROCEDURE — 99214 OFFICE O/P EST MOD 30 MIN: CPT | Performed by: FAMILY MEDICINE

## 2024-09-11 PROCEDURE — 83605 ASSAY OF LACTIC ACID: CPT | Performed by: EMERGENCY MEDICINE

## 2024-09-11 PROCEDURE — 84484 ASSAY OF TROPONIN QUANT: CPT | Performed by: EMERGENCY MEDICINE

## 2024-09-11 PROCEDURE — 2500000002 HC RX 250 W HCPCS SELF ADMINISTERED DRUGS (ALT 637 FOR MEDICARE OP, ALT 636 FOR OP/ED)

## 2024-09-11 PROCEDURE — 85610 PROTHROMBIN TIME: CPT | Performed by: EMERGENCY MEDICINE

## 2024-09-11 PROCEDURE — 96365 THER/PROPH/DIAG IV INF INIT: CPT

## 2024-09-11 RX ORDER — IPRATROPIUM BROMIDE AND ALBUTEROL SULFATE 2.5; .5 MG/3ML; MG/3ML
3 SOLUTION RESPIRATORY (INHALATION)
Status: DISCONTINUED | OUTPATIENT
Start: 2024-09-11 | End: 2024-09-11

## 2024-09-11 RX ORDER — IPRATROPIUM BROMIDE AND ALBUTEROL SULFATE 2.5; .5 MG/3ML; MG/3ML
3 SOLUTION RESPIRATORY (INHALATION) 3 TIMES DAILY
Status: DISCONTINUED | OUTPATIENT
Start: 2024-09-11 | End: 2024-09-16 | Stop reason: HOSPADM

## 2024-09-11 RX ORDER — ACETAMINOPHEN 325 MG/1
650 TABLET ORAL EVERY 4 HOURS PRN
Status: DISCONTINUED | OUTPATIENT
Start: 2024-09-11 | End: 2024-09-16 | Stop reason: HOSPADM

## 2024-09-11 RX ORDER — PANTOPRAZOLE SODIUM 40 MG/1
40 TABLET, DELAYED RELEASE ORAL
Status: DISCONTINUED | OUTPATIENT
Start: 2024-09-12 | End: 2024-09-16 | Stop reason: HOSPADM

## 2024-09-11 RX ORDER — ENOXAPARIN SODIUM 100 MG/ML
40 INJECTION SUBCUTANEOUS EVERY 24 HOURS
Status: DISCONTINUED | OUTPATIENT
Start: 2024-09-11 | End: 2024-09-13

## 2024-09-11 RX ORDER — IPRATROPIUM BROMIDE AND ALBUTEROL SULFATE 2.5; .5 MG/3ML; MG/3ML
3 SOLUTION RESPIRATORY (INHALATION) ONCE
Status: COMPLETED | OUTPATIENT
Start: 2024-09-11 | End: 2024-09-11

## 2024-09-11 RX ORDER — POLYETHYLENE GLYCOL 3350 17 G/17G
17 POWDER, FOR SOLUTION ORAL DAILY PRN
Status: DISCONTINUED | OUTPATIENT
Start: 2024-09-11 | End: 2024-09-16 | Stop reason: HOSPADM

## 2024-09-11 RX ORDER — ALBUTEROL SULFATE 0.83 MG/ML
2.5 SOLUTION RESPIRATORY (INHALATION) EVERY 2 HOUR PRN
Status: DISCONTINUED | OUTPATIENT
Start: 2024-09-11 | End: 2024-09-16 | Stop reason: HOSPADM

## 2024-09-11 RX ORDER — GUAIFENESIN 600 MG/1
600 TABLET, EXTENDED RELEASE ORAL 2 TIMES DAILY
Status: DISCONTINUED | OUTPATIENT
Start: 2024-09-11 | End: 2024-09-16 | Stop reason: HOSPADM

## 2024-09-11 RX ORDER — ONDANSETRON HYDROCHLORIDE 2 MG/ML
4 INJECTION, SOLUTION INTRAVENOUS EVERY 8 HOURS PRN
Status: DISCONTINUED | OUTPATIENT
Start: 2024-09-11 | End: 2024-09-16 | Stop reason: HOSPADM

## 2024-09-11 RX ADMIN — METHYLPREDNISOLONE SODIUM SUCCINATE 40 MG: 40 INJECTION, POWDER, FOR SOLUTION INTRAMUSCULAR; INTRAVENOUS at 21:21

## 2024-09-11 RX ADMIN — GUAIFENESIN 600 MG: 600 TABLET, EXTENDED RELEASE ORAL at 20:23

## 2024-09-11 RX ADMIN — AZITHROMYCIN MONOHYDRATE 500 MG: 500 INJECTION, POWDER, LYOPHILIZED, FOR SOLUTION INTRAVENOUS at 18:47

## 2024-09-11 RX ADMIN — SODIUM CHLORIDE 500 ML: 9 INJECTION, SOLUTION INTRAVENOUS at 15:16

## 2024-09-11 RX ADMIN — IPRATROPIUM BROMIDE AND ALBUTEROL SULFATE 3 ML: 2.5; .5 SOLUTION RESPIRATORY (INHALATION) at 20:28

## 2024-09-11 RX ADMIN — ENOXAPARIN SODIUM 40 MG: 40 INJECTION SUBCUTANEOUS at 18:47

## 2024-09-11 RX ADMIN — IPRATROPIUM BROMIDE AND ALBUTEROL SULFATE 3 ML: .5; 3 SOLUTION RESPIRATORY (INHALATION) at 14:29

## 2024-09-11 SDOH — SOCIAL STABILITY: SOCIAL INSECURITY: HAVE YOU HAD ANY THOUGHTS OF HARMING ANYONE ELSE?: NO

## 2024-09-11 SDOH — SOCIAL STABILITY: SOCIAL INSECURITY: ARE YOU MARRIED, WIDOWED, DIVORCED, SEPARATED, NEVER MARRIED, OR LIVING WITH A PARTNER?: MARRIED

## 2024-09-11 SDOH — ECONOMIC STABILITY: FOOD INSECURITY: WITHIN THE PAST 12 MONTHS, THE FOOD YOU BOUGHT JUST DIDN'T LAST AND YOU DIDN'T HAVE MONEY TO GET MORE.: NEVER TRUE

## 2024-09-11 SDOH — SOCIAL STABILITY: SOCIAL NETWORK: HOW OFTEN DO YOU ATTEND MEETINGS OF THE CLUBS OR ORGANIZATIONS YOU BELONG TO?: NEVER

## 2024-09-11 SDOH — HEALTH STABILITY: MENTAL HEALTH: HOW OFTEN DO YOU HAVE SIX OR MORE DRINKS ON ONE OCCASION?: NEVER

## 2024-09-11 SDOH — SOCIAL STABILITY: SOCIAL NETWORK: HOW OFTEN DO YOU GET TOGETHER WITH FRIENDS OR RELATIVES?: THREE TIMES A WEEK

## 2024-09-11 SDOH — ECONOMIC STABILITY: TRANSPORTATION INSECURITY
IN THE PAST 12 MONTHS, HAS LACK OF TRANSPORTATION KEPT YOU FROM MEETINGS, WORK, OR FROM GETTING THINGS NEEDED FOR DAILY LIVING?: NO

## 2024-09-11 SDOH — HEALTH STABILITY: MENTAL HEALTH
HOW OFTEN DO YOU NEED TO HAVE SOMEONE HELP YOU WHEN YOU READ INSTRUCTIONS, PAMPHLETS, OR OTHER WRITTEN MATERIAL FROM YOUR DOCTOR OR PHARMACY?: NEVER

## 2024-09-11 SDOH — ECONOMIC STABILITY: FOOD INSECURITY: WITHIN THE PAST 12 MONTHS, YOU WORRIED THAT YOUR FOOD WOULD RUN OUT BEFORE YOU GOT MONEY TO BUY MORE.: NEVER TRUE

## 2024-09-11 SDOH — ECONOMIC STABILITY: FOOD INSECURITY: HOW HARD IS IT FOR YOU TO PAY FOR THE VERY BASICS LIKE FOOD, HOUSING, MEDICAL CARE, AND HEATING?: NOT VERY HARD

## 2024-09-11 SDOH — ECONOMIC STABILITY: INCOME INSECURITY: HOW HARD IS IT FOR YOU TO PAY FOR THE VERY BASICS LIKE FOOD, HOUSING, MEDICAL CARE, AND HEATING?: NOT VERY HARD

## 2024-09-11 SDOH — ECONOMIC STABILITY: INCOME INSECURITY: IN THE PAST 12 MONTHS, HAS THE ELECTRIC, GAS, OIL, OR WATER COMPANY THREATENED TO SHUT OFF SERVICE IN YOUR HOME?: NO

## 2024-09-11 SDOH — SOCIAL STABILITY: SOCIAL INSECURITY: WERE YOU ABLE TO COMPLETE ALL THE BEHAVIORAL HEALTH SCREENINGS?: YES

## 2024-09-11 SDOH — HEALTH STABILITY: MENTAL HEALTH
STRESS IS WHEN SOMEONE FEELS TENSE, NERVOUS, ANXIOUS, OR CAN'T SLEEP AT NIGHT BECAUSE THEIR MIND IS TROUBLED. HOW STRESSED ARE YOU?: NOT AT ALL

## 2024-09-11 SDOH — HEALTH STABILITY: MENTAL HEALTH: HOW MANY STANDARD DRINKS CONTAINING ALCOHOL DO YOU HAVE ON A TYPICAL DAY?: PATIENT DOES NOT DRINK

## 2024-09-11 SDOH — ECONOMIC STABILITY: INCOME INSECURITY: IN THE PAST 12 MONTHS HAS THE ELECTRIC, GAS, OIL, OR WATER COMPANY THREATENED TO SHUT OFF SERVICES IN YOUR HOME?: NO

## 2024-09-11 SDOH — HEALTH STABILITY: MENTAL HEALTH
DO YOU FEEL STRESS - TENSE, RESTLESS, NERVOUS, OR ANXIOUS, OR UNABLE TO SLEEP AT NIGHT BECAUSE YOUR MIND IS TROUBLED ALL THE TIME - THESE DAYS?: NOT AT ALL

## 2024-09-11 SDOH — ECONOMIC STABILITY: FOOD INSECURITY: WITHIN THE PAST 12 MONTHS, YOU WORRIED THAT YOUR FOOD WOULD RUN OUT BEFORE YOU GOT THE MONEY TO BUY MORE.: NEVER TRUE

## 2024-09-11 SDOH — SOCIAL STABILITY: SOCIAL NETWORK: IN A TYPICAL WEEK, HOW MANY TIMES DO YOU TALK ON THE PHONE WITH FAMILY, FRIENDS, OR NEIGHBORS?: THREE TIMES A WEEK

## 2024-09-11 SDOH — SOCIAL STABILITY: SOCIAL NETWORK: HOW OFTEN DO YOU ATTEND CHURCH OR RELIGIOUS SERVICES?: PATIENT DECLINED

## 2024-09-11 SDOH — SOCIAL STABILITY: SOCIAL NETWORK: HOW OFTEN DO YOU ATTENT MEETINGS OF THE CLUB OR ORGANIZATION YOU BELONG TO?: NEVER

## 2024-09-11 SDOH — SOCIAL STABILITY: SOCIAL INSECURITY: WITHIN THE LAST YEAR, HAVE YOU BEEN AFRAID OF YOUR PARTNER OR EX-PARTNER?: NO

## 2024-09-11 SDOH — SOCIAL STABILITY: SOCIAL INSECURITY
WITHIN THE LAST YEAR, HAVE TO BEEN RAPED OR FORCED TO HAVE ANY KIND OF SEXUAL ACTIVITY BY YOUR PARTNER OR EX-PARTNER?: NO

## 2024-09-11 SDOH — ECONOMIC STABILITY: HOUSING INSECURITY: IN THE LAST 12 MONTHS, WAS THERE A TIME WHEN YOU WERE NOT ABLE TO PAY THE MORTGAGE OR RENT ON TIME?: NO

## 2024-09-11 SDOH — SOCIAL STABILITY: SOCIAL INSECURITY: WITHIN THE LAST YEAR, HAVE YOU BEEN HUMILIATED OR EMOTIONALLY ABUSED IN OTHER WAYS BY YOUR PARTNER OR EX-PARTNER?: NO

## 2024-09-11 SDOH — SOCIAL STABILITY: SOCIAL INSECURITY
WITHIN THE LAST YEAR, HAVE YOU BEEN KICKED, HIT, SLAPPED, OR OTHERWISE PHYSICALLY HURT BY YOUR PARTNER OR EX-PARTNER?: NO

## 2024-09-11 SDOH — ECONOMIC STABILITY: HOUSING INSECURITY: AT ANY TIME IN THE PAST 12 MONTHS, WERE YOU HOMELESS OR LIVING IN A SHELTER (INCLUDING NOW)?: NO

## 2024-09-11 SDOH — HEALTH STABILITY: MENTAL HEALTH: HOW OFTEN DO YOU HAVE A DRINK CONTAINING ALCOHOL?: NEVER

## 2024-09-11 SDOH — SOCIAL STABILITY: SOCIAL NETWORK
DO YOU BELONG TO ANY CLUBS OR ORGANIZATIONS SUCH AS CHURCH GROUPS, UNIONS, FRATERNAL OR ATHLETIC GROUPS, OR SCHOOL GROUPS?: NO

## 2024-09-11 SDOH — SOCIAL STABILITY: SOCIAL INSECURITY
WITHIN THE LAST YEAR, HAVE YOU BEEN RAPED OR FORCED TO HAVE ANY KIND OF SEXUAL ACTIVITY BY YOUR PARTNER OR EX-PARTNER?: NO

## 2024-09-11 SDOH — HEALTH STABILITY: PHYSICAL HEALTH: ON AVERAGE, HOW MANY DAYS PER WEEK DO YOU ENGAGE IN MODERATE TO STRENUOUS EXERCISE (LIKE A BRISK WALK)?: 6 DAYS

## 2024-09-11 SDOH — ECONOMIC STABILITY: HOUSING INSECURITY: IN THE PAST 12 MONTHS, HOW MANY TIMES HAVE YOU MOVED WHERE YOU WERE LIVING?: 0

## 2024-09-11 SDOH — ECONOMIC STABILITY: INCOME INSECURITY: IN THE LAST 12 MONTHS, WAS THERE A TIME WHEN YOU WERE NOT ABLE TO PAY THE MORTGAGE OR RENT ON TIME?: NO

## 2024-09-11 SDOH — HEALTH STABILITY: MENTAL HEALTH: HOW MANY DRINKS CONTAINING ALCOHOL DO YOU HAVE ON A TYPICAL DAY WHEN YOU ARE DRINKING?: PATIENT DOES NOT DRINK

## 2024-09-11 SDOH — SOCIAL STABILITY: SOCIAL NETWORK: ARE YOU MARRIED, WIDOWED, DIVORCED, SEPARATED, NEVER MARRIED, OR LIVING WITH A PARTNER?: MARRIED

## 2024-09-11 SDOH — SOCIAL STABILITY: SOCIAL NETWORK
DO YOU BELONG TO ANY CLUBS OR ORGANIZATIONS SUCH AS CHURCH GROUPS UNIONS, FRATERNAL OR ATHLETIC GROUPS, OR SCHOOL GROUPS?: NO

## 2024-09-11 SDOH — SOCIAL STABILITY: SOCIAL INSECURITY: ARE YOU OR HAVE YOU BEEN THREATENED OR ABUSED PHYSICALLY, EMOTIONALLY, OR SEXUALLY BY ANYONE?: NO

## 2024-09-11 SDOH — SOCIAL STABILITY: SOCIAL INSECURITY: DO YOU FEEL UNSAFE GOING BACK TO THE PLACE WHERE YOU ARE LIVING?: NO

## 2024-09-11 SDOH — ECONOMIC STABILITY: TRANSPORTATION INSECURITY: IN THE PAST 12 MONTHS, HAS LACK OF TRANSPORTATION KEPT YOU FROM MEDICAL APPOINTMENTS OR FROM GETTING MEDICATIONS?: NO

## 2024-09-11 SDOH — SOCIAL STABILITY: SOCIAL INSECURITY: HAS ANYONE EVER THREATENED TO HURT YOUR FAMILY OR YOUR PETS?: NO

## 2024-09-11 SDOH — SOCIAL STABILITY: SOCIAL INSECURITY: DOES ANYONE TRY TO KEEP YOU FROM HAVING/CONTACTING OTHER FRIENDS OR DOING THINGS OUTSIDE YOUR HOME?: NO

## 2024-09-11 SDOH — SOCIAL STABILITY: SOCIAL INSECURITY: ARE THERE ANY APPARENT SIGNS OF INJURIES/BEHAVIORS THAT COULD BE RELATED TO ABUSE/NEGLECT?: NO

## 2024-09-11 SDOH — HEALTH STABILITY: PHYSICAL HEALTH: ON AVERAGE, HOW MANY MINUTES DO YOU ENGAGE IN EXERCISE AT THIS LEVEL?: 30 MIN

## 2024-09-11 SDOH — SOCIAL STABILITY: SOCIAL INSECURITY: ABUSE: ADULT

## 2024-09-11 SDOH — SOCIAL STABILITY: SOCIAL INSECURITY: HAVE YOU HAD THOUGHTS OF HARMING ANYONE ELSE?: NO

## 2024-09-11 SDOH — HEALTH STABILITY: MENTAL HEALTH: HOW OFTEN DO YOU HAVE 6 OR MORE DRINKS ON ONE OCCASION?: NEVER

## 2024-09-11 SDOH — SOCIAL STABILITY: SOCIAL INSECURITY: DO YOU FEEL ANYONE HAS EXPLOITED OR TAKEN ADVANTAGE OF YOU FINANCIALLY OR OF YOUR PERSONAL PROPERTY?: NO

## 2024-09-11 SDOH — ECONOMIC STABILITY: TRANSPORTATION INSECURITY
IN THE PAST 12 MONTHS, HAS THE LACK OF TRANSPORTATION KEPT YOU FROM MEDICAL APPOINTMENTS OR FROM GETTING MEDICATIONS?: NO

## 2024-09-11 ASSESSMENT — ACTIVITIES OF DAILY LIVING (ADL)
TOILETING: INDEPENDENT
PATIENT'S MEMORY ADEQUATE TO SAFELY COMPLETE DAILY ACTIVITIES?: YES
GROOMING: INDEPENDENT
HEARING - LEFT EAR: DIFFICULTY WITH NOISE
WALKS IN HOME: INDEPENDENT
PATIENT'S MEMORY ADEQUATE TO SAFELY COMPLETE DAILY ACTIVITIES?: YES
ADEQUATE_TO_COMPLETE_ADL: YES
DRESSING YOURSELF: INDEPENDENT
HEARING - RIGHT EAR: DIFFICULTY WITH NOISE
GROOMING: INDEPENDENT
HEARING - RIGHT EAR: DIFFICULTY WITH NOISE
JUDGMENT_ADEQUATE_SAFELY_COMPLETE_DAILY_ACTIVITIES: YES
BATHING: INDEPENDENT
ADEQUATE_TO_COMPLETE_ADL: YES
TOILETING: INDEPENDENT
FEEDING YOURSELF: INDEPENDENT
LACK_OF_TRANSPORTATION: NO
JUDGMENT_ADEQUATE_SAFELY_COMPLETE_DAILY_ACTIVITIES: YES
DRESSING YOURSELF: INDEPENDENT
ASSISTIVE_DEVICE: EYEGLASSES
FEEDING YOURSELF: INDEPENDENT
ASSISTIVE_DEVICE: EYEGLASSES
HEARING - LEFT EAR: DIFFICULTY WITH NOISE

## 2024-09-11 ASSESSMENT — COGNITIVE AND FUNCTIONAL STATUS - GENERAL
CLIMB 3 TO 5 STEPS WITH RAILING: A LITTLE
DAILY ACTIVITIY SCORE: 24
TOILETING: A LITTLE
MOBILITY SCORE: 24
DAILY ACTIVITIY SCORE: 23
WALKING IN HOSPITAL ROOM: A LITTLE
PATIENT BASELINE BEDBOUND: NO
MOBILITY SCORE: 22

## 2024-09-11 ASSESSMENT — LIFESTYLE VARIABLES
AUDIT-C TOTAL SCORE: 0
HOW OFTEN DO YOU HAVE 6 OR MORE DRINKS ON ONE OCCASION: NEVER
AUDIT-C TOTAL SCORE: 0
SKIP TO QUESTIONS 9-10: 1
SUBSTANCE_ABUSE_PAST_12_MONTHS: NO
HOW OFTEN DO YOU HAVE A DRINK CONTAINING ALCOHOL: NEVER
PRESCIPTION_ABUSE_PAST_12_MONTHS: NO
HOW MANY STANDARD DRINKS CONTAINING ALCOHOL DO YOU HAVE ON A TYPICAL DAY: PATIENT DOES NOT DRINK
AUDIT-C TOTAL SCORE: 0
SKIP TO QUESTIONS 9-10: 1

## 2024-09-11 ASSESSMENT — PATIENT HEALTH QUESTIONNAIRE - PHQ9
1. LITTLE INTEREST OR PLEASURE IN DOING THINGS: NOT AT ALL
2. FEELING DOWN, DEPRESSED OR HOPELESS: NOT AT ALL
SUM OF ALL RESPONSES TO PHQ9 QUESTIONS 1 & 2: 0

## 2024-09-11 ASSESSMENT — PAIN - FUNCTIONAL ASSESSMENT: PAIN_FUNCTIONAL_ASSESSMENT: 0-10

## 2024-09-11 ASSESSMENT — PAIN SCALES - GENERAL
PAINLEVEL_OUTOF10: 0 - NO PAIN
PAINLEVEL_OUTOF10: 0 - NO PAIN

## 2024-09-11 NOTE — ED PROVIDER NOTES
"I performed a history and physical examination of Radhames Ken and discussed his management with the  MLP  I agree with the history, physical, assessment, and plan of care.    S: Patient presents for ongoing cough.  The cough worsens at night.  His wife describes severe coughing episode last night with him turning blue in the face and unable to catch his breath.  She gave him an albuterol treatment and improved.  He denies any fever or chills.  He had no nausea or vomiting.  There is no recent change in medication or diet.  He denies orthopnea or chest pain.    O: Visit Vitals  /71   Pulse 93   Temp 36.4 °C (97.5 °F) (Temporal)   Resp (!) 21   Ht 1.753 m (5' 9\")   Wt 73 kg (161 lb)   SpO2 99%   BMI 23.78 kg/m²   Smoking Status Never   BSA 1.89 m²   Alert male who does not appear in acute distress at this time  Head atraumatic  Neck supple 1+ JVD  Lung sounds diminished with basilar rales  Heart tones normal and regular  Abdomen nontender  No significant pedal edema    EKG: Sinus rhythm with occasional PVC, left axis deviation, right bundle branch block, inferior Q waves are present, no acute ST elevations appreciated.  Interpreted by CHILO Patino MD    A: Patient severe coughing episode last night turning blue in the face unable to catch his breath.  It appears to be more bronchitis or reactive airway disease problem is is no evidence of pneumonia COVID influenza or RSV.  Given his advanced age he will be placed in observation overnight and  a CT scan of his chest has been ordered for further evaluation.    All laboratory and radiology results have been personally reviewed by myself   LABS:  Labs Reviewed   CBC WITH AUTO DIFFERENTIAL - Abnormal       Result Value    WBC 8.2      nRBC 0.0      RBC 4.47 (*)     Hemoglobin 13.6      Hematocrit 40.7 (*)     MCV 91      MCH 30.4      MCHC 33.4      RDW 13.5      Platelets 139 (*)     Neutrophils % 69.9      Immature Granulocytes %, Automated 0.5      Lymphocytes " % 12.2      Monocytes % 6.2      Eosinophils % 9.7      Basophils % 1.5      Neutrophils Absolute 5.75 (*)     Immature Granulocytes Absolute, Automated 0.04      Lymphocytes Absolute 1.00      Monocytes Absolute 0.51      Eosinophils Absolute 0.80 (*)     Basophils Absolute 0.12 (*)    COMPREHENSIVE METABOLIC PANEL - Abnormal    Glucose 109 (*)     Sodium 131 (*)     Potassium 4.1      Chloride 97 (*)     Bicarbonate 26      Anion Gap 12      Urea Nitrogen 20      Creatinine 1.38 (*)     eGFR 49 (*)     Calcium 9.0      Albumin 3.9      Alkaline Phosphatase 142 (*)     Total Protein 6.1 (*)     AST 17      Bilirubin, Total 1.7 (*)     ALT 16     B-TYPE NATRIURETIC PEPTIDE - Abnormal     (*)     Narrative:        <100 pg/mL - Heart failure unlikely  100-299 pg/mL - Intermediate probability of acute heart                  failure exacerbation. Correlate with clinical                  context and patient history.    >=300 pg/mL - Heart Failure likely. Correlate with clinical                  context and patient history.    BNP testing is performed using different testing methodology at [facility] than at other [facility]. Direct result comparisons should only be made within the same method.      LACTATE - Abnormal    Lactate 2.1 (*)     Narrative:     Venipuncture immediately after or during the administration of Metamizole may lead to falsely low results. Testing should be performed immediately  prior to Metamizole dosing.   LACTATE - Abnormal    Lactate 2.2 (*)     Narrative:     Venipuncture immediately after or during the administration of Metamizole may lead to falsely low results. Testing should be performed immediately  prior to Metamizole dosing.   PROTIME-INR - Normal    Protime 12.3      INR 1.1     SERIAL TROPONIN-INITIAL - Normal    Troponin I, High Sensitivity 20      Narrative:     Less than 99th percentile of normal range cutoff-  Female and children under 18 years old <14  ng/L; Male <21 ng/L: Negative  Repeat testing should be performed if clinically indicated.     Female and children under 18 years old 14-50 ng/L; Male 21-50 ng/L:  Consistent with possible cardiac damage and possible increased clinical   risk. Serial measurements may help to assess extent of myocardial damage.     >50 ng/L: Consistent with cardiac damage, increased clinical risk and  myocardial infarction. Serial measurements may help assess extent of   myocardial damage.      NOTE: Children less than 1 year old may have higher baseline troponin   levels and results should be interpreted in conjunction with the overall   clinical context.     NOTE: Troponin I testing is performed using a different   testing methodology at [facility] than at other   [facility]. Direct result comparisons should only   be made within the same method.   SERIAL TROPONIN, 1 HOUR - Normal    Troponin I, High Sensitivity 18      Narrative:     Less than 99th percentile of normal range cutoff-  Female and children under 18 years old <14 ng/L; Male <21 ng/L: Negative  Repeat testing should be performed if clinically indicated.     Female and children under 18 years old 14-50 ng/L; Male 21-50 ng/L:  Consistent with possible cardiac damage and possible increased clinical   risk. Serial measurements may help to assess extent of myocardial damage.     >50 ng/L: Consistent with cardiac damage, increased clinical risk and  myocardial infarction. Serial measurements may help assess extent of   myocardial damage.      NOTE: Children less than 1 year old may have higher baseline troponin   levels and results should be interpreted in conjunction with the overall   clinical context.     NOTE: Troponin I testing is performed using a different   testing methodology at [facility] than at other   [facility]. Direct result comparisons should only   be made within the same method.   SARS-COV-2 PCR - Normal     Coronavirus 2019, PCR Not Detected      Narrative:     This assay has received FDA Emergency Use Authorization (EUA) and is only authorized for the duration of time that circumstances exist to justify the authorization of the emergency use of in vitro diagnostic tests for the detection of SARS-CoV-2 virus and/or diagnosis of COVID-19 infection under section 564(b)(1) of the Act, 21 U.S.C. 360bbb-3(b)(1). This assay is an in vitro diagnostic nucleic acid amplification test for the qualitative detection of SARS-CoV-2 from nasopharyngeal specimens and has been validated for use at [facility]. Negative results do not preclude COVID-19 infections and should not be used as the sole basis for diagnosis, treatment, or other management decisions.     INFLUENZA A AND B PCR - Normal    Flu A Result Not Detected      Flu B Result Not Detected      Narrative:     This assay is an in vitro diagnostic multiplex nucleic acid amplification test for the detection and discrimination of Influenza A & B from nasopharyngeal specimens, and has been validated for use at [facility]. Negative results do not preclude Influenza A/B infections, and should not be used as the sole basis for diagnosis, treatment, or other management decisions. If Influenza A/B and RSV PCR results are negative, testing for Parainfluenza virus, Adenovirus and Metapneumovirus is routinely performed for [facility] pediatric oncology and intensive care inpatients, and is available on other patients by placing an add-on request.   RSV PCR - Normal    RSV PCR Not Detected      Narrative:     This assay is an FDA-cleared, in vitro diagnostic nucleic acid amplification test for the detection of RSV from nasopharyngeal specimens, and has been validated for use at [facility]. Negative results do not preclude RSV infections, and should not be used as the sole basis for diagnosis, treatment, or other management  decisions. If Influenza A/B and RSV PCR results are negative, testing for Parainfluenza virus, Adenovirus and Metapneumovirus is routinely performed for pediatric oncology and intensive care inpatients at [facility], and is available on other patients by placing an add-on request.       TROPONIN SERIES- (INITIAL, 1 HR)    Narrative:     The following orders were created for panel order Troponin I Series, High Sensitivity (0, 1 HR).  Procedure                               Abnormality         Status                     ---------                               -----------         ------                     Troponin I, High Sensiti...[[id number]]  Normal              Final result               Troponin, High Sensitivi...[[id number]]  Normal              Final result                 Please view results for these tests on the individual orders.         RADIOLOGY:  Interpreted by Radiologist.  XR chest 2 views   Final Result   1. No acute pulmonary pathology.   2. Stable 1 cm density in the mid left lung. This may represent a   granuloma.   Signed by Chris Sims MD          Encounter Date: 09/11/24   ECG 12 lead   Result Value    Ventricular Rate 91    Atrial Rate 91    AL Interval 180    QRS Duration 126    QT Interval 354    QTC Calculation(Bazett) 435    P Axis 41    R Axis -65    T Axis 79    QRS Count 15    Q Onset 206    P Onset 116    P Offset 176    T Offset 383    QTC Fredericia 407    Narrative    Sinus rhythm with occasional Premature ventricular complexes  Left axis deviation  Right bundle branch block  Inferior infarct (cited on or before 22-NOV-2023)  Abnormal ECG  When compared with ECG of 22-NOV-2023 10:20,  Right bundle branch block is now Present       Clinical Course: Patient has been stable here in the ED.  His history of being blue in the face shortness of breath with a coughing  episode he could not control last night is of concern.  After consultation with the family and patient decision made to observe  the patient for further care and treatment.    1. Dyspnea, unspecified type        2. Persistent cough        3. Acute pulmonary embolism, unspecified pulmonary embolism type, unspecified whether acute cor pulmonale present (Multi)  Transthoracic Echo (TTE) Complete    Transthoracic Echo (TTE) Complete        See ML chart for ED course.    MDM:    Placed in observation    Disposition: Observation    PMitul Patino MD  09/11/24 1938       David Patino MD  09/13/24 0820       David Patino MD  09/15/24 0819

## 2024-09-12 ENCOUNTER — APPOINTMENT (OUTPATIENT)
Dept: RADIOLOGY | Facility: HOSPITAL | Age: 89
DRG: 176 | End: 2024-09-12
Payer: MEDICARE

## 2024-09-12 PROBLEM — J40 BRONCHITIS: Status: ACTIVE | Noted: 2024-09-12

## 2024-09-12 PROBLEM — N17.9 ACUTE RENAL FAILURE (ARF) (CMS-HCC): Status: ACTIVE | Noted: 2024-09-12

## 2024-09-12 PROBLEM — I50.42 CHRONIC COMBINED SYSTOLIC AND DIASTOLIC HEART FAILURE: Status: ACTIVE | Noted: 2024-09-12

## 2024-09-12 PROBLEM — R06.00 DYSPNEA: Status: RESOLVED | Noted: 2024-03-11 | Resolved: 2024-09-12

## 2024-09-12 PROBLEM — I25.10 CORONARY ARTERY DISEASE INVOLVING NATIVE CORONARY ARTERY OF NATIVE HEART WITHOUT ANGINA PECTORIS: Status: ACTIVE | Noted: 2024-09-12

## 2024-09-12 PROBLEM — J44.1 COPD EXACERBATION (MULTI): Status: RESOLVED | Noted: 2023-12-22 | Resolved: 2024-09-12

## 2024-09-12 PROBLEM — Z95.2 S/P TAVR (TRANSCATHETER AORTIC VALVE REPLACEMENT): Status: ACTIVE | Noted: 2024-09-12

## 2024-09-12 PROBLEM — R05.3 PERSISTENT COUGH: Status: RESOLVED | Noted: 2023-09-21 | Resolved: 2024-09-12

## 2024-09-12 PROBLEM — R06.00 DYSPNEA, UNSPECIFIED TYPE: Status: ACTIVE | Noted: 2024-09-12

## 2024-09-12 LAB
ANION GAP SERPL CALC-SCNC: 12 MMOL/L (ref 10–20)
BUN SERPL-MCNC: 19 MG/DL (ref 6–23)
CALCIUM SERPL-MCNC: 8.8 MG/DL (ref 8.6–10.3)
CHLORIDE SERPL-SCNC: 102 MMOL/L (ref 98–107)
CO2 SERPL-SCNC: 21 MMOL/L (ref 21–32)
CREAT SERPL-MCNC: 1.11 MG/DL (ref 0.5–1.3)
D DIMER PPP FEU-MCNC: 5648 NG/ML FEU
EGFRCR SERPLBLD CKD-EPI 2021: 63 ML/MIN/1.73M*2
ERYTHROCYTE [DISTWIDTH] IN BLOOD BY AUTOMATED COUNT: 13.2 % (ref 11.5–14.5)
GLUCOSE SERPL-MCNC: 151 MG/DL (ref 74–99)
HCT VFR BLD AUTO: 39.3 % (ref 41–52)
HGB BLD-MCNC: 13.1 G/DL (ref 13.5–17.5)
HOLD SPECIMEN: NORMAL
MCH RBC QN AUTO: 30 PG (ref 26–34)
MCHC RBC AUTO-ENTMCNC: 33.3 G/DL (ref 32–36)
MCV RBC AUTO: 90 FL (ref 80–100)
NRBC BLD-RTO: 0 /100 WBCS (ref 0–0)
PLATELET # BLD AUTO: 122 X10*3/UL (ref 150–450)
POTASSIUM SERPL-SCNC: 4.4 MMOL/L (ref 3.5–5.3)
RBC # BLD AUTO: 4.37 X10*6/UL (ref 4.5–5.9)
SODIUM SERPL-SCNC: 131 MMOL/L (ref 136–145)
WBC # BLD AUTO: 5 X10*3/UL (ref 4.4–11.3)

## 2024-09-12 PROCEDURE — 94760 N-INVAS EAR/PLS OXIMETRY 1: CPT

## 2024-09-12 PROCEDURE — 71275 CT ANGIOGRAPHY CHEST: CPT | Performed by: RADIOLOGY

## 2024-09-12 PROCEDURE — 85379 FIBRIN DEGRADATION QUANT: CPT

## 2024-09-12 PROCEDURE — 96376 TX/PRO/DX INJ SAME DRUG ADON: CPT

## 2024-09-12 PROCEDURE — 36415 COLL VENOUS BLD VENIPUNCTURE: CPT | Performed by: NURSE PRACTITIONER

## 2024-09-12 PROCEDURE — 80048 BASIC METABOLIC PNL TOTAL CA: CPT | Performed by: NURSE PRACTITIONER

## 2024-09-12 PROCEDURE — 85027 COMPLETE CBC AUTOMATED: CPT | Performed by: NURSE PRACTITIONER

## 2024-09-12 PROCEDURE — 2500000004 HC RX 250 GENERAL PHARMACY W/ HCPCS (ALT 636 FOR OP/ED): Mod: IPSPLIT

## 2024-09-12 PROCEDURE — 84145 PROCALCITONIN (PCT): CPT | Mod: GENLAB

## 2024-09-12 PROCEDURE — 2500000001 HC RX 250 WO HCPCS SELF ADMINISTERED DRUGS (ALT 637 FOR MEDICARE OP)

## 2024-09-12 PROCEDURE — 99223 1ST HOSP IP/OBS HIGH 75: CPT

## 2024-09-12 PROCEDURE — 71275 CT ANGIOGRAPHY CHEST: CPT | Mod: IPSPLIT

## 2024-09-12 PROCEDURE — 2500000004 HC RX 250 GENERAL PHARMACY W/ HCPCS (ALT 636 FOR OP/ED): Mod: IPSPLIT | Performed by: NURSE PRACTITIONER

## 2024-09-12 PROCEDURE — 2500000004 HC RX 250 GENERAL PHARMACY W/ HCPCS (ALT 636 FOR OP/ED): Mod: IPSPLIT | Performed by: INTERNAL MEDICINE

## 2024-09-12 PROCEDURE — 2550000001 HC RX 255 CONTRASTS: Mod: IPSPLIT | Performed by: INTERNAL MEDICINE

## 2024-09-12 PROCEDURE — 36415 COLL VENOUS BLD VENIPUNCTURE: CPT | Mod: IPSPLIT

## 2024-09-12 PROCEDURE — 94640 AIRWAY INHALATION TREATMENT: CPT

## 2024-09-12 PROCEDURE — 1100000001 HC PRIVATE ROOM DAILY: Mod: IPSPLIT

## 2024-09-12 PROCEDURE — 2500000002 HC RX 250 W HCPCS SELF ADMINISTERED DRUGS (ALT 637 FOR MEDICARE OP, ALT 636 FOR OP/ED)

## 2024-09-12 RX ORDER — SODIUM CHLORIDE 9 MG/ML
10 INJECTION, SOLUTION INTRAVENOUS CONTINUOUS PRN
Status: DISCONTINUED | OUTPATIENT
Start: 2024-09-12 | End: 2024-09-16 | Stop reason: HOSPADM

## 2024-09-12 RX ORDER — METOPROLOL SUCCINATE 25 MG/1
12.5 TABLET, EXTENDED RELEASE ORAL
Status: DISCONTINUED | OUTPATIENT
Start: 2024-09-12 | End: 2024-09-16 | Stop reason: HOSPADM

## 2024-09-12 RX ORDER — EPLERENONE 25 MG/1
25 TABLET, FILM COATED ORAL DAILY
Status: DISCONTINUED | OUTPATIENT
Start: 2024-09-12 | End: 2024-09-16 | Stop reason: HOSPADM

## 2024-09-12 RX ORDER — NAPROXEN SODIUM 220 MG/1
81 TABLET, FILM COATED ORAL DAILY
Status: DISCONTINUED | OUTPATIENT
Start: 2024-09-12 | End: 2024-09-16 | Stop reason: HOSPADM

## 2024-09-12 RX ORDER — FUROSEMIDE 40 MG/1
40 TABLET ORAL
Status: DISCONTINUED | OUTPATIENT
Start: 2024-09-12 | End: 2024-09-16 | Stop reason: HOSPADM

## 2024-09-12 RX ORDER — CEFTRIAXONE 1 G/50ML
1 INJECTION, SOLUTION INTRAVENOUS EVERY 24 HOURS
Status: DISCONTINUED | OUTPATIENT
Start: 2024-09-12 | End: 2024-09-13

## 2024-09-12 RX ORDER — ATORVASTATIN CALCIUM 40 MG/1
40 TABLET, FILM COATED ORAL DAILY
Status: DISCONTINUED | OUTPATIENT
Start: 2024-09-12 | End: 2024-09-16 | Stop reason: HOSPADM

## 2024-09-12 RX ORDER — BENZONATATE 100 MG/1
100 CAPSULE ORAL 3 TIMES DAILY PRN
Status: DISCONTINUED | OUTPATIENT
Start: 2024-09-12 | End: 2024-09-16 | Stop reason: HOSPADM

## 2024-09-12 RX ADMIN — IPRATROPIUM BROMIDE AND ALBUTEROL SULFATE 3 ML: 2.5; .5 SOLUTION RESPIRATORY (INHALATION) at 09:12

## 2024-09-12 RX ADMIN — ASPIRIN 81 MG CHEWABLE TABLET 81 MG: 81 TABLET CHEWABLE at 09:37

## 2024-09-12 RX ADMIN — ENOXAPARIN SODIUM 40 MG: 40 INJECTION SUBCUTANEOUS at 17:53

## 2024-09-12 RX ADMIN — CEFTRIAXONE 1 G: 1 INJECTION, SOLUTION INTRAVENOUS at 11:55

## 2024-09-12 RX ADMIN — BENZONATATE 100 MG: 100 CAPSULE ORAL at 11:55

## 2024-09-12 RX ADMIN — SODIUM CHLORIDE 10 ML/HR: 9 INJECTION, SOLUTION INTRAVENOUS at 11:55

## 2024-09-12 RX ADMIN — METOPROLOL SUCCINATE 12.5 MG: 25 TABLET, EXTENDED RELEASE ORAL at 09:37

## 2024-09-12 RX ADMIN — ATORVASTATIN CALCIUM 40 MG: 40 TABLET, FILM COATED ORAL at 21:31

## 2024-09-12 RX ADMIN — METHYLPREDNISOLONE SODIUM SUCCINATE 40 MG: 40 INJECTION, POWDER, FOR SOLUTION INTRAMUSCULAR; INTRAVENOUS at 14:23

## 2024-09-12 RX ADMIN — METHYLPREDNISOLONE SODIUM SUCCINATE 40 MG: 40 INJECTION, POWDER, FOR SOLUTION INTRAMUSCULAR; INTRAVENOUS at 21:31

## 2024-09-12 RX ADMIN — IPRATROPIUM BROMIDE AND ALBUTEROL SULFATE 3 ML: 2.5; .5 SOLUTION RESPIRATORY (INHALATION) at 21:55

## 2024-09-12 RX ADMIN — GUAIFENESIN 600 MG: 600 TABLET, EXTENDED RELEASE ORAL at 21:31

## 2024-09-12 RX ADMIN — AZITHROMYCIN MONOHYDRATE 500 MG: 500 INJECTION, POWDER, LYOPHILIZED, FOR SOLUTION INTRAVENOUS at 17:54

## 2024-09-12 RX ADMIN — IPRATROPIUM BROMIDE AND ALBUTEROL SULFATE 3 ML: 2.5; .5 SOLUTION RESPIRATORY (INHALATION) at 17:11

## 2024-09-12 RX ADMIN — GUAIFENESIN 600 MG: 600 TABLET, EXTENDED RELEASE ORAL at 09:37

## 2024-09-12 RX ADMIN — EPLERENONE 25 MG: 25 TABLET, FILM COATED ORAL at 09:37

## 2024-09-12 RX ADMIN — IOHEXOL 75 ML: 350 INJECTION, SOLUTION INTRAVENOUS at 11:45

## 2024-09-12 RX ADMIN — ALBUTEROL SULFATE 2.5 MG: 2.5 SOLUTION RESPIRATORY (INHALATION) at 04:31

## 2024-09-12 RX ADMIN — METHYLPREDNISOLONE SODIUM SUCCINATE 40 MG: 40 INJECTION, POWDER, FOR SOLUTION INTRAMUSCULAR; INTRAVENOUS at 06:04

## 2024-09-12 RX ADMIN — FUROSEMIDE 40 MG: 40 TABLET ORAL at 09:37

## 2024-09-12 ASSESSMENT — ENCOUNTER SYMPTOMS
WHEEZING: 1
CARDIOVASCULAR NEGATIVE: 1
NEUROLOGICAL NEGATIVE: 1
EYES NEGATIVE: 1
ENDOCRINE NEGATIVE: 1
MUSCULOSKELETAL NEGATIVE: 1
CONSTITUTIONAL NEGATIVE: 1
ALLERGIC/IMMUNOLOGIC NEGATIVE: 1
SHORTNESS OF BREATH: 1
PSYCHIATRIC NEGATIVE: 1
HEMATOLOGIC/LYMPHATIC NEGATIVE: 1
GASTROINTESTINAL NEGATIVE: 1
COUGH: 1

## 2024-09-12 ASSESSMENT — ACTIVITIES OF DAILY LIVING (ADL): LACK_OF_TRANSPORTATION: NO

## 2024-09-12 ASSESSMENT — PAIN SCALES - GENERAL
PAINLEVEL_OUTOF10: 0 - NO PAIN
PAINLEVEL_OUTOF10: 0 - NO PAIN

## 2024-09-12 NOTE — DISCHARGE INSTR - OTHER ORDERS
Thank you for choosing [facility] for your Health Care needs. As you transition from the hospital back to home, we hope we took your preferences into account on how you manage your health needs so you can manage your health at home.     You may receive a survey in the mail within the next couple weeks. Please take the time to complete it and return it. Your input is ALWAYS important to us. Thank you!  Your Care Transition Team - [facility] [employer] - [phone]    For questions about your medications listed on your discharge instructions, please call the Nurses Station at [phone].

## 2024-09-12 NOTE — PROGRESS NOTES
09/12/24 1103   Discharge Planning   Living Arrangements Spouse/significant other   Support Systems Spouse/significant other;Children   Assistance Needed AAOX3. Independent in ADL's. No DME used at baseline. He no longer drives. His son or daughters provide transportation. His children also assist the patient and his wife with shopping. His wife does the cooking and cleaning. Patient does not feel that he will need any further services on discahrge. He will be here for a few days for ATB. DC Plan: Home no needs when medically ready.   Type of Residence Private residence  (2 story single family home. Patient resides on the main floor.)   Number of Stairs to Enter Residence 4  (bilateral railing)   Do you have animals or pets at home? Yes   Type of Animals or Pets dog   Who is requesting discharge planning? Provider   Home or Post Acute Services None   Expected Discharge Disposition Home   Does the patient need discharge transport arranged? No   Financial Resource Strain   How hard is it for you to pay for the very basics like food, housing, medical care, and heating? Not very   Housing Stability   In the last 12 months, was there a time when you were not able to pay the mortgage or rent on time? N   In the past 12 months, how many times have you moved where you were living? 0   At any time in the past 12 months, were you homeless or living in a shelter (including now)? N   Transportation Needs   In the past 12 months, has lack of transportation kept you from medical appointments or from getting medications? no   In the past 12 months, has lack of transportation kept you from meetings, work, or from getting things needed for daily living? No

## 2024-09-12 NOTE — CARE PLAN
The clinical goals for the shift include Patient will have decreased SOB this shift.    Over the shift, the patient did not report SOB although lungs sound wheezy. Vitals remained stable this shift, no reports of pain. IV abx were tolerated well this shift with no adverse reactions. Patient has been up to the recliner, showered, and spent the rest of the shift resting in bed. Currently in bed with call light in reach and wife at bedside.

## 2024-09-12 NOTE — CARE PLAN
The patient's goals for the shift include  get some rest    The clinical goals for the shift include pt will remain safe and have breathing treatments during this shift    Over the shift, the patient was medicated as ordered. Pt was a late admission and daily meds not ordered. Night shift on call NP notified. No further orders. Pt complains of shortness of breath after coughing episodes and has an audible wheeze. Breathing treatment given during this shift.  Pt spouse at bedside. Call light within reach for assistance.

## 2024-09-12 NOTE — H&P
History Of Present Illness  Radhames Ken is a 89 y.o. male presenting with cough. Pt states that he has been feeling short of breath at home and having coughing fits. He sees Tyesha Gatica for pulmonology. He has no history of COPD or asthma and recently had normal PFTs. Imaging revealed bronchitis and mucous plugging. Pt is stable on RA. Moist cough noted on exam.     ED VS: T36.4, HR 91, RR 20, /66, Sp02 96%RA    Imaging: CXR- 1. No acute pulmonary pathology.  2. Stable 1 cm density in the mid left lung. This may represent a  granuloma.    -CT Chest: 1. No acute traumatic injury to the chest.  2.  There is some mild bronchial opacification in the lower lobes  bilaterally suggestive underlying bronchitis and/or mucous plugging.  This finding is new since prior exam.    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    Diarrhea, functional    Heart disease     HL (hearing loss)     Hypertension     Other disorders of plasma-protein metabolism, not elsewhere classified 05/30/2017    Serum albumin decreased    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    Personal history of diseases of the skin and subcutaneous tissue 02/18/2019    History of eczema    Personal history of other diseases of male genital organs     History of BPH    Personal history of other diseases of the circulatory system     History of hypertension    Personal history  of other diseases of the circulatory system     History of mitral valve insufficiency    Personal history of other diseases of the musculoskeletal system and connective tissue     Personal history of arthritis    Personal history of other endocrine, nutritional and metabolic disease     History of hyperlipidemia    Personal history of other specified conditions 11/09/2018    History of seizure    Personal history of other specified conditions 05/05/2022    History of vomiting    Stroke (Multi)     Visual impairment        Surgical History  Past Surgical History:   Procedure Laterality Date    CARDIAC SURGERY  04/28/2014    Heart Surgery    CHOLECYSTECTOMY  04/28/2014    Cholecystectomy    GALLBLADDER SURGERY  04/28/2014    Gallbladder Surgery    MR HEAD ANGIO WO IV CONTRAST  9/30/2018    MR HEAD ANGIO WO IV CONTRAST 9/30/2018 [facility]    MR NECK ANGIO WO IV CONTRAST  9/30/2018    MR NECK ANGIO WO IV CONTRAST 9/30/2018 [facility]    OTHER SURGICAL HISTORY  04/28/2014    Knee Repair    OTHER SURGICAL HISTORY  04/28/2014    Needle Biopsy Of Prostate    OTHER SURGICAL HISTORY  03/05/2021    Complete colonoscopy    OTHER SURGICAL HISTORY  03/05/2021    Endoscopy    OTHER SURGICAL HISTORY  10/08/2018    Common Bile Duct Stone Removal    PROSTATE SURGERY  10/08/2018    Prostate Surgery        Social History  He reports that he has never smoked. He has never used smokeless tobacco. He reports that he does not currently use alcohol. He reports that he does not use drugs.    Family History  Family History   Problem Relation Name Age of Onset    Heart disease Mother Abiola     Breast cancer Mother Abiola     Lung cancer Father      Cancer Brother Arpit     Cancer Brother Kevin     Cancer Brother Tere     Cancer Brother          Allergies  Patient has no known allergies.    Review of Systems   Constitutional: Negative.    HENT:  Positive for congestion.    Eyes: Negative.    Respiratory:  Positive for  "cough, shortness of breath and wheezing.    Cardiovascular: Negative.    Gastrointestinal: Negative.    Endocrine: Negative.    Genitourinary: Negative.    Musculoskeletal: Negative.    Skin: Negative.    Allergic/Immunologic: Negative.    Neurological: Negative.    Hematological: Negative.    Psychiatric/Behavioral: Negative.          Physical Exam  Vitals reviewed.   HENT:      Head: Normocephalic and atraumatic.      Right Ear: External ear normal.      Left Ear: External ear normal.      Nose: Nose normal.      Mouth/Throat:      Pharynx: Oropharynx is clear.   Eyes:      Conjunctiva/sclera: Conjunctivae normal.   Cardiovascular:      Rate and Rhythm: Normal rate and regular rhythm.      Pulses: Normal pulses.      Heart sounds: Normal heart sounds.   Pulmonary:      Breath sounds: Wheezing and rhonchi present.   Abdominal:      General: Bowel sounds are normal.      Palpations: Abdomen is soft.   Musculoskeletal:         General: Normal range of motion.      Cervical back: Normal range of motion and neck supple.   Skin:     General: Skin is dry.   Neurological:      General: No focal deficit present.      Mental Status: He is alert and oriented to person, place, and time.   Psychiatric:         Mood and Affect: Mood normal.         Behavior: Behavior normal.          Last Recorded Vitals  Blood pressure 137/75, pulse 87, temperature 36.4 °C (97.5 °F), temperature source Temporal, resp. rate 17, height 1.803 m (5' 11\"), weight 64.5 kg (142 lb 1.6 oz), SpO2 94%.    Relevant Results  Scheduled medications  aspirin, 81 mg, oral, Daily  atorvastatin, 40 mg, oral, Daily  azithromycin, 500 mg, intravenous, q24h  cefTRIAXone, 1 g, intravenous, q24h  enoxaparin, 40 mg, subcutaneous, q24h  eplerenone, 25 mg, oral, Daily  furosemide, 40 mg, oral, Daily  guaiFENesin, 600 mg, oral, BID  ipratropium-albuteroL, 3 mL, nebulization, TID  methylPREDNISolone sodium succinate (PF), 40 mg, intravenous, q8h LAKISHA  metoprolol succinate " XL, 12.5 mg, oral, Daily  pantoprazole, 40 mg, oral, Daily before breakfast      Continuous medications  sodium chloride 0.9%, 10 mL/hr      PRN medications  PRN medications: acetaminophen, acetaminophen, albuterol, benzocaine-menthol, benzonatate, ondansetron, polyethylene glycol, sodium chloride 0.9%    Results for orders placed or performed during the hospital encounter of 09/11/24 (from the past 24 hour(s))   CBC with Differential   Result Value Ref Range    WBC 8.2 4.4 - 11.3 x10*3/uL    nRBC 0.0 0.0 - 0.0 /100 WBCs    RBC 4.47 (L) 4.50 - 5.90 x10*6/uL    Hemoglobin 13.6 13.5 - 17.5 g/dL    Hematocrit 40.7 (L) 41.0 - 52.0 %    MCV 91 80 - 100 fL    MCH 30.4 26.0 - 34.0 pg    MCHC 33.4 32.0 - 36.0 g/dL    RDW 13.5 11.5 - 14.5 %    Platelets 139 (L) 150 - 450 x10*3/uL    Neutrophils % 69.9 40.0 - 80.0 %    Immature Granulocytes %, Automated 0.5 0.0 - 0.9 %    Lymphocytes % 12.2 13.0 - 44.0 %    Monocytes % 6.2 2.0 - 10.0 %    Eosinophils % 9.7 0.0 - 6.0 %    Basophils % 1.5 0.0 - 2.0 %    Neutrophils Absolute 5.75 (H) 1.60 - 5.50 x10*3/uL    Immature Granulocytes Absolute, Automated 0.04 0.00 - 0.50 x10*3/uL    Lymphocytes Absolute 1.00 0.80 - 3.00 x10*3/uL    Monocytes Absolute 0.51 0.05 - 0.80 x10*3/uL    Eosinophils Absolute 0.80 (H) 0.00 - 0.40 x10*3/uL    Basophils Absolute 0.12 (H) 0.00 - 0.10 x10*3/uL   Comprehensive Metabolic Panel   Result Value Ref Range    Glucose 109 (H) 74 - 99 mg/dL    Sodium 131 (L) 136 - 145 mmol/L    Potassium 4.1 3.5 - 5.3 mmol/L    Chloride 97 (L) 98 - 107 mmol/L    Bicarbonate 26 21 - 32 mmol/L    Anion Gap 12 10 - 20 mmol/L    Urea Nitrogen 20 6 - 23 mg/dL    Creatinine 1.38 (H) 0.50 - 1.30 mg/dL    eGFR 49 (L) >60 mL/min/1.73m*2    Calcium 9.0 8.6 - 10.3 mg/dL    Albumin 3.9 3.4 - 5.0 g/dL    Alkaline Phosphatase 142 (H) 33 - 136 U/L    Total Protein 6.1 (L) 6.4 - 8.2 g/dL    AST 17 9 - 39 U/L    Bilirubin, Total 1.7 (H) 0.0 - 1.2 mg/dL    ALT 16 10 - 52 U/L   Brain  Natriuretic Peptide   Result Value Ref Range     (H) 0 - 99 pg/mL   Protime-INR   Result Value Ref Range    Protime 12.3 9.8 - 12.8 seconds    INR 1.1 0.9 - 1.1   Lactate   Result Value Ref Range    Lactate 2.1 (H) 0.4 - 2.0 mmol/L   Troponin I, High Sensitivity, Initial   Result Value Ref Range    Troponin I, High Sensitivity 20 0 - 20 ng/L   ECG 12 lead   Result Value Ref Range    Ventricular Rate 91 BPM    Atrial Rate 91 BPM    MO Interval 180 ms    QRS Duration 126 ms    QT Interval 354 ms    QTC Calculation(Bazett) 435 ms    P Axis 41 degrees    R Axis -65 degrees    T Axis 79 degrees    QRS Count 15 beats    Q Onset 206 ms    P Onset 116 ms    P Offset 176 ms    T Offset 383 ms    QTC Fredericia 407 ms   Sars-CoV-2 PCR   Result Value Ref Range    Coronavirus 2019, PCR Not Detected Not Detected   Influenza A, and B PCR   Result Value Ref Range    Flu A Result Not Detected Not Detected    Flu B Result Not Detected Not Detected   RSV PCR   Result Value Ref Range    RSV PCR Not Detected Not Detected   Troponin, High Sensitivity, 1 Hour   Result Value Ref Range    Troponin I, High Sensitivity 18 0 - 20 ng/L   Lactate   Result Value Ref Range    Lactate 2.2 (H) 0.4 - 2.0 mmol/L   CBC   Result Value Ref Range    WBC 5.0 4.4 - 11.3 x10*3/uL    nRBC 0.0 0.0 - 0.0 /100 WBCs    RBC 4.37 (L) 4.50 - 5.90 x10*6/uL    Hemoglobin 13.1 (L) 13.5 - 17.5 g/dL    Hematocrit 39.3 (L) 41.0 - 52.0 %    MCV 90 80 - 100 fL    MCH 30.0 26.0 - 34.0 pg    MCHC 33.3 32.0 - 36.0 g/dL    RDW 13.2 11.5 - 14.5 %    Platelets 122 (L) 150 - 450 x10*3/uL   Basic metabolic panel   Result Value Ref Range    Glucose 151 (H) 74 - 99 mg/dL    Sodium 131 (L) 136 - 145 mmol/L    Potassium 4.4 3.5 - 5.3 mmol/L    Chloride 102 98 - 107 mmol/L    Bicarbonate 21 21 - 32 mmol/L    Anion Gap 12 10 - 20 mmol/L    Urea Nitrogen 19 6 - 23 mg/dL    Creatinine 1.11 0.50 - 1.30 mg/dL    eGFR 63 >60 mL/min/1.73m*2    Calcium 8.8 8.6 - 10.3 mg/dL   D-dimer,  VTE Exclusion   Result Value Ref Range    D-Dimer, Quantitative VTE Exclusion 5,648 (H) <=500 ng/mL FEU   SST TOP   Result Value Ref Range    Extra Tube Hold for add-ons.         Assessment/Plan   Assessment & Plan  COPD exacerbation (Multi) (Resolved: 9/12/2024)    Persistent cough (Resolved: 9/12/2024)    Shortness of breath    Bronchitis    Acute renal failure (ARF) (CMS-HCC)    Chronic combined systolic and diastolic heart failure (Multi)    S/P TAVR (transcatheter aortic valve replacement)    HTN (hypertension)    Coronary artery disease involving native coronary artery of native heart without angina pectoris    Hyperlipemia      #Shortness of breath  #Bronchitis  -Increasing SOB with coughing fits at home   -CXR: 1. No acute pulmonary pathology.  2. Stable 1 cm density in the mid left lung. This may represent a  granuloma.  -CT Chest: 1. No acute traumatic injury to the chest.  2.  There is some mild bronchial opacification in the lower lobes  bilaterally suggestive underlying bronchitis and/or mucous plugging.  -D-dimer 5648  -CT PE pending   -WBC 8.2  -Covid/flu/RSV neg   -Lactate 2.1 > 2.2   -No hx of asthma/COPD; recent workup with pulm and PFTs WNL  -Mucinex BID  -Bronchodilators  -Stable on RA  -IV azithro, ceftriaxone (Day 1)  -Solumedrol q8    #Acute renal failure, improved   -Bun/creat 20/1.38 > 10/1.11  -Received 500ml bolus in ED  -Given CHF history, will hold off additional fluids and monitor renal function  -Renally dose medications as able  -Daily BMP    #Chronic combined systolic and diastolic heart failure   #S/P TAVR (3/24)  #Essential HTN  #CAD  #HLD  -Echo (5/24): - The left ventricle is normal in size. There is mild left ventricular   hypertrophy. Left ventricular systolic function is normal. EF = 53 ± 5% (2D   biplane) RWMA as above.   - The right ventricle is normal in size. Right ventricular systolic function is   normal.   - The left atrial cavity is dilated.   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 Eccentric MR - may be   underestimated.   - S/P transcatheter aortic valve replacement. Grace S3 Ultra prosthetic aortic   valve (size #26). There is no aortic valve regurgitation. The peak gradient is 16   mmHg, the mean gradient is 9 mmHg and the dimensionless valve index is 0.44. Prior    peak/mean gradients were 9/5 mmHg.   -Trop 20 > 18  -  -Continue ASA, atorvastatin, eplerenone, furosemide, metoprolol    DVT ppx  -lovenox    PUD ppx  -pantoprazole    F: PRN  E: Replete per protocol  N: Cardiac  A: PIV    Disposition: Pt requires less than 2 inpatient days at this time   Code Status:        Socorro Patterson APRN-CNP  Attending Attestation:    I was present with the APRN-CNP who participated in the documentation of this note. I have personally seen and re-examined the patient and performed the medical decision-making components (assessment and plan of care). I have reviewed the documentation and verified the findings in the note as written with additions or exceptions as stated in the body of this note.     Dr. Angeles Tanner MD  Internal Medicine

## 2024-09-13 ENCOUNTER — APPOINTMENT (OUTPATIENT)
Dept: CARDIOLOGY | Facility: HOSPITAL | Age: 89
DRG: 176 | End: 2024-09-13
Payer: MEDICARE

## 2024-09-13 PROBLEM — E87.20 LACTIC ACIDOSIS: Status: ACTIVE | Noted: 2024-09-13

## 2024-09-13 PROBLEM — D69.6 THROMBOCYTOPENIA (CMS-HCC): Status: ACTIVE | Noted: 2024-09-13

## 2024-09-13 PROBLEM — I26.99 ACUTE PULMONARY EMBOLISM (MULTI): Status: ACTIVE | Noted: 2024-09-13

## 2024-09-13 LAB
ANION GAP SERPL CALC-SCNC: 11 MMOL/L (ref 10–20)
AORTIC VALVE MEAN GRADIENT: 13 MMHG
AORTIC VALVE PEAK VELOCITY: 2.32 M/S
AV PEAK GRADIENT: 21.5 MMHG
AVA (PEAK VEL): 2.26 CM2
AVA (VTI): 2.44 CM2
BUN SERPL-MCNC: 29 MG/DL (ref 6–23)
CALCIUM SERPL-MCNC: 8.7 MG/DL (ref 8.6–10.3)
CHLORIDE SERPL-SCNC: 101 MMOL/L (ref 98–107)
CO2 SERPL-SCNC: 25 MMOL/L (ref 21–32)
CREAT SERPL-MCNC: 1.21 MG/DL (ref 0.5–1.3)
EGFRCR SERPLBLD CKD-EPI 2021: 57 ML/MIN/1.73M*2
EJECTION FRACTION APICAL 4 CHAMBER: 45.6
EJECTION FRACTION: 58 %
ERYTHROCYTE [DISTWIDTH] IN BLOOD BY AUTOMATED COUNT: 13.3 % (ref 11.5–14.5)
GLUCOSE SERPL-MCNC: 160 MG/DL (ref 74–99)
HCT VFR BLD AUTO: 39 % (ref 41–52)
HGB BLD-MCNC: 13.1 G/DL (ref 13.5–17.5)
HOLD SPECIMEN: NORMAL
HOLD SPECIMEN: NORMAL
LACTATE SERPL-SCNC: 1.4 MMOL/L (ref 0.4–2)
LEFT VENTRICULAR OUTFLOW TRACT DIAMETER: 2.3 CM
MCH RBC QN AUTO: 30.3 PG (ref 26–34)
MCHC RBC AUTO-ENTMCNC: 33.6 G/DL (ref 32–36)
MCV RBC AUTO: 90 FL (ref 80–100)
MITRAL VALVE E/A RATIO: 0.72
NRBC BLD-RTO: 0 /100 WBCS (ref 0–0)
PLATELET # BLD AUTO: 136 X10*3/UL (ref 150–450)
POTASSIUM SERPL-SCNC: 4.6 MMOL/L (ref 3.5–5.3)
PROCALCITONIN SERPL-MCNC: 0.04 NG/ML
RBC # BLD AUTO: 4.33 X10*6/UL (ref 4.5–5.9)
RIGHT VENTRICLE FREE WALL PEAK S': 14.1 CM/S
RIGHT VENTRICLE PEAK SYSTOLIC PRESSURE: 27 MMHG
SODIUM SERPL-SCNC: 132 MMOL/L (ref 136–145)
TRICUSPID ANNULAR PLANE SYSTOLIC EXCURSION: 2.5 CM
WBC # BLD AUTO: 9.7 X10*3/UL (ref 4.4–11.3)

## 2024-09-13 PROCEDURE — 2500000001 HC RX 250 WO HCPCS SELF ADMINISTERED DRUGS (ALT 637 FOR MEDICARE OP): Mod: IPSPLIT

## 2024-09-13 PROCEDURE — 2500000004 HC RX 250 GENERAL PHARMACY W/ HCPCS (ALT 636 FOR OP/ED): Mod: IPSPLIT | Performed by: INTERNAL MEDICINE

## 2024-09-13 PROCEDURE — 94640 AIRWAY INHALATION TREATMENT: CPT | Mod: IPSPLIT

## 2024-09-13 PROCEDURE — 36415 COLL VENOUS BLD VENIPUNCTURE: CPT | Mod: IPSPLIT

## 2024-09-13 PROCEDURE — 82374 ASSAY BLOOD CARBON DIOXIDE: CPT | Mod: IPSPLIT

## 2024-09-13 PROCEDURE — 2500000004 HC RX 250 GENERAL PHARMACY W/ HCPCS (ALT 636 FOR OP/ED): Mod: IPSPLIT

## 2024-09-13 PROCEDURE — 2500000004 HC RX 250 GENERAL PHARMACY W/ HCPCS (ALT 636 FOR OP/ED): Mod: IPSPLIT | Performed by: NURSE PRACTITIONER

## 2024-09-13 PROCEDURE — 9420000001 HC RT PATIENT EDUCATION 5 MIN: Mod: IPSPLIT

## 2024-09-13 PROCEDURE — 2500000002 HC RX 250 W HCPCS SELF ADMINISTERED DRUGS (ALT 637 FOR MEDICARE OP, ALT 636 FOR OP/ED): Mod: IPSPLIT

## 2024-09-13 PROCEDURE — 94760 N-INVAS EAR/PLS OXIMETRY 1: CPT | Mod: IPSPLIT

## 2024-09-13 PROCEDURE — 1100000001 HC PRIVATE ROOM DAILY: Mod: IPSPLIT

## 2024-09-13 PROCEDURE — 80048 BASIC METABOLIC PNL TOTAL CA: CPT | Mod: IPSPLIT

## 2024-09-13 PROCEDURE — 83605 ASSAY OF LACTIC ACID: CPT | Mod: IPSPLIT

## 2024-09-13 PROCEDURE — 85027 COMPLETE CBC AUTOMATED: CPT | Mod: IPSPLIT

## 2024-09-13 PROCEDURE — 93306 TTE W/DOPPLER COMPLETE: CPT | Mod: IPSPLIT

## 2024-09-13 PROCEDURE — 99233 SBSQ HOSP IP/OBS HIGH 50: CPT

## 2024-09-13 RX ORDER — CEFUROXIME AXETIL 250 MG/1
500 TABLET ORAL 2 TIMES DAILY
Status: DISCONTINUED | OUTPATIENT
Start: 2024-09-14 | End: 2024-09-16 | Stop reason: HOSPADM

## 2024-09-13 RX ORDER — AZITHROMYCIN 250 MG/1
500 TABLET, FILM COATED ORAL
Status: COMPLETED | OUTPATIENT
Start: 2024-09-13 | End: 2024-09-13

## 2024-09-13 RX ADMIN — APIXABAN 10 MG: 5 TABLET, FILM COATED ORAL at 20:09

## 2024-09-13 RX ADMIN — EPLERENONE 25 MG: 25 TABLET, FILM COATED ORAL at 09:03

## 2024-09-13 RX ADMIN — APIXABAN 10 MG: 5 TABLET, FILM COATED ORAL at 09:02

## 2024-09-13 RX ADMIN — GUAIFENESIN 600 MG: 600 TABLET, EXTENDED RELEASE ORAL at 20:09

## 2024-09-13 RX ADMIN — CEFTRIAXONE 1 G: 1 INJECTION, SOLUTION INTRAVENOUS at 11:47

## 2024-09-13 RX ADMIN — ASPIRIN 81 MG CHEWABLE TABLET 81 MG: 81 TABLET CHEWABLE at 09:03

## 2024-09-13 RX ADMIN — IPRATROPIUM BROMIDE AND ALBUTEROL SULFATE 3 ML: 2.5; .5 SOLUTION RESPIRATORY (INHALATION) at 14:53

## 2024-09-13 RX ADMIN — METOPROLOL SUCCINATE 12.5 MG: 25 TABLET, EXTENDED RELEASE ORAL at 06:34

## 2024-09-13 RX ADMIN — AZITHROMYCIN DIHYDRATE 500 MG: 250 TABLET, FILM COATED ORAL at 17:25

## 2024-09-13 RX ADMIN — GUAIFENESIN 600 MG: 600 TABLET, EXTENDED RELEASE ORAL at 09:02

## 2024-09-13 RX ADMIN — ATORVASTATIN CALCIUM 40 MG: 40 TABLET, FILM COATED ORAL at 09:07

## 2024-09-13 RX ADMIN — IPRATROPIUM BROMIDE AND ALBUTEROL SULFATE 3 ML: 2.5; .5 SOLUTION RESPIRATORY (INHALATION) at 08:27

## 2024-09-13 RX ADMIN — METHYLPREDNISOLONE SODIUM SUCCINATE 40 MG: 40 INJECTION, POWDER, FOR SOLUTION INTRAMUSCULAR; INTRAVENOUS at 06:34

## 2024-09-13 RX ADMIN — FUROSEMIDE 40 MG: 40 TABLET ORAL at 06:34

## 2024-09-13 RX ADMIN — METHYLPREDNISOLONE SODIUM SUCCINATE 40 MG: 40 INJECTION, POWDER, FOR SOLUTION INTRAMUSCULAR; INTRAVENOUS at 17:25

## 2024-09-13 RX ADMIN — SODIUM CHLORIDE 10 ML/HR: 9 INJECTION, SOLUTION INTRAVENOUS at 11:48

## 2024-09-13 RX ADMIN — IPRATROPIUM BROMIDE AND ALBUTEROL SULFATE 3 ML: 2.5; .5 SOLUTION RESPIRATORY (INHALATION) at 22:50

## 2024-09-13 ASSESSMENT — ENCOUNTER SYMPTOMS
SHORTNESS OF BREATH: 1
FEVER: 0
ABDOMINAL DISTENTION: 0
CHILLS: 0
ABDOMINAL PAIN: 0
COUGH: 1
WEAKNESS: 0
WHEEZING: 1
PALPITATIONS: 0
DIZZINESS: 0

## 2024-09-13 ASSESSMENT — PAIN SCALES - GENERAL
PAINLEVEL_OUTOF10: 0 - NO PAIN

## 2024-09-13 ASSESSMENT — PAIN - FUNCTIONAL ASSESSMENT: PAIN_FUNCTIONAL_ASSESSMENT: 0-10

## 2024-09-13 NOTE — CONSULTS
Inpatient consult to Cardiology  Consult performed by: VELMA Koch-CNP  Consult ordered by: Melissa Dominguez PA-C  Reason for consult: PE          History Of Present Illness  Radhames Ken is a 89 y.o. male presenting at the direction of his PCP for shortness of breath and congestion.  He is a somewhat poor historian and does not know exactly why he was sent in or what his exact symptoms were.  According to his PCP note he presented in the office with wheezing, coughing, and emesis.  He denies any chest pain, dizziness, palpitations.    Lab work in the ER showed glucose 109, sodium 131, potassium 4.1, BUN/creatinine 20/1.38, alk phos 142, lactate 2.1, , troponin negative, INR 1.1, WBCs 8.2, H&H 13.6/40.7, chest x-ray showed a stable 1 cm density in the mid left lung, CT of the chest without contrast showed mild bronchial opacification in the lower lobes bilaterally suggestive of underlying bronchitis and/or mucous plugging.  D-dimer elevated at 5648.  CTA of the chest showed a small pulmonary embolus in the distal right upper lobar branch,    EKG showed SR, inferior infarct, no acute ischemic changes.    He was then started on a heparin gtt.    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    Diarrhea, functional    Heart disease     HL (hearing loss)     Hypertension     Other disorders of plasma-protein metabolism, not elsewhere classified 05/30/2017    Serum albumin decreased    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    Personal history of diseases  of the skin and subcutaneous tissue 02/18/2019    History of eczema    Personal history of other diseases of male genital organs     History of BPH    Personal history of other diseases of the circulatory system     History of hypertension    Personal history of other diseases of the circulatory system     History of mitral valve insufficiency    Personal history of other diseases of the musculoskeletal system and connective tissue     Personal history of arthritis    Personal history of other endocrine, nutritional and metabolic disease     History of hyperlipidemia    Personal history of other specified conditions 11/09/2018    History of seizure    Personal history of other specified conditions 05/05/2022    History of vomiting    Stroke (Multi)     Visual impairment    Aortic stenosis s/p TAVR    Surgical History  Past Surgical History:   Procedure Laterality Date    CARDIAC SURGERY  04/28/2014    Heart Surgery    CHOLECYSTECTOMY  04/28/2014    Cholecystectomy    GALLBLADDER SURGERY  04/28/2014    Gallbladder Surgery    MR HEAD ANGIO WO IV CONTRAST  9/30/2018    MR HEAD ANGIO WO IV CONTRAST 9/30/2018 [facility]ICAL [facility]GAC[facility]    MR NECK ANGIO WO IV CONTRAST  9/30/2018    MR NECK ANGIO WO IV CONTRAST 9/30/2018 [facility] CLINICAL LEGAC[facility]    OTHER SURGICAL HISTORY  04/28/2014    Knee Repair    OTHER SURGICAL HISTORY  04/28/2014    Needle Biopsy Of Prostate    OTHER SURGICAL HISTORY  03/05/2021    Complete colonoscopy    OTHER SURGICAL HISTORY  03/05/2021    Endoscopy    OTHER SURGICAL HISTORY  10/08/2018    Common Bile Duct Stone Removal    PROSTATE SURGERY  10/08/2018    Prostate Surgery        Social History  He reports that he has never smoked. He has never used smokeless tobacco. He reports that he does not currently use alcohol. He reports that he does not use drugs.    Family History  Family History   Problem Relation Name Age of Onset    Heart disease Mother Abiola     Breast cancer Mother Abiola     Lung cancer Father    "   Cancer Brother Arpit     Cancer Brother Kevin     Cancer Brother Tere     Cancer Brother          Allergies  Patient has no known allergies.    Review of Systems  Review of Systems   Constitutional:  Negative for chills and fever.   Respiratory:  Positive for cough, shortness of breath and wheezing.    Cardiovascular:  Negative for chest pain, palpitations and leg swelling.   Gastrointestinal:  Negative for abdominal distention and abdominal pain.   Musculoskeletal:  Negative for gait problem.   Neurological:  Negative for dizziness and weakness.   All other systems reviewed and are negative.         Physical Exam  Constitutional: Well developed, awake/alert/oriented x3, no distress, alert and cooperative  Eyes: PERRL, EOMI, clear sclera  ENMT: mucous membranes moist, no apparent injury, no lesions seen  Head/Neck: Neck supple, no apparent injury, thyroid without mass or tenderness, No JVD, trachea midline, no bruits  Respiratory/Thorax: Patent airways, CTAB, normal breath sounds with good chest expansion, thorax symmetric  Cardiovascular: Regular, rate and rhythm, no murmurs, 2+ equal pulses of the extremities, normal S 1and S 2  Gastrointestinal: Nondistended, soft, non-tender, no rebound tenderness or guarding, no masses palpable, no organomegaly, +BS, no bruits  Musculoskeletal: ROM intact, no joint swelling, normal strength  Extremities: normal extremities, no cyanosis edema, contusions or wounds, no clubbing  Neurological: alert and oriented x3, intact senses, motor, response and reflexes, normal strength  Lymphatic: No significant lymphadenopathy  Psychological: Appropriate mood and behavior  Skin: Warm and dry, no lesions, no rashes       Last Recorded Vitals  Blood pressure 120/71, pulse 84, temperature 36.5 °C (97.7 °F), temperature source Temporal, resp. rate 18, height 1.803 m (5' 11\"), weight 64.5 kg (142 lb 1.6 oz), SpO2 95%.    Relevant Results    Scheduled medications  apixaban, 10 mg, oral, " BID   Followed by  [START ON 9/20/2024] apixaban, 5 mg, oral, BID  aspirin, 81 mg, oral, Daily  atorvastatin, 40 mg, oral, Daily  azithromycin, 500 mg, intravenous, q24h  cefTRIAXone, 1 g, intravenous, q24h  eplerenone, 25 mg, oral, Daily  furosemide, 40 mg, oral, Daily  guaiFENesin, 600 mg, oral, BID  ipratropium-albuteroL, 3 mL, nebulization, TID  methylPREDNISolone sodium succinate (PF), 40 mg, intravenous, q12h  metoprolol succinate XL, 12.5 mg, oral, Daily  pantoprazole, 40 mg, oral, Daily before breakfast      Continuous medications  sodium chloride 0.9%, 10 mL/hr, Last Rate: 10 mL/hr (09/13/24 1148)      PRN medications  PRN medications: acetaminophen, acetaminophen, albuterol, benzocaine-menthol, benzonatate, ondansetron, polyethylene glycol, sodium chloride 0.9%    Results for orders placed or performed during the hospital encounter of 09/11/24 (from the past 24 hour(s))   Procalcitonin   Result Value Ref Range    Procalcitonin 0.04 <=0.07 ng/mL   CBC   Result Value Ref Range    WBC 9.7 4.4 - 11.3 x10*3/uL    nRBC 0.0 0.0 - 0.0 /100 WBCs    RBC 4.33 (L) 4.50 - 5.90 x10*6/uL    Hemoglobin 13.1 (L) 13.5 - 17.5 g/dL    Hematocrit 39.0 (L) 41.0 - 52.0 %    MCV 90 80 - 100 fL    MCH 30.3 26.0 - 34.0 pg    MCHC 33.6 32.0 - 36.0 g/dL    RDW 13.3 11.5 - 14.5 %    Platelets 136 (L) 150 - 450 x10*3/uL   Basic Metabolic Panel   Result Value Ref Range    Glucose 160 (H) 74 - 99 mg/dL    Sodium 132 (L) 136 - 145 mmol/L    Potassium 4.6 3.5 - 5.3 mmol/L    Chloride 101 98 - 107 mmol/L    Bicarbonate 25 21 - 32 mmol/L    Anion Gap 11 10 - 20 mmol/L    Urea Nitrogen 29 (H) 6 - 23 mg/dL    Creatinine 1.21 0.50 - 1.30 mg/dL    eGFR 57 (L) >60 mL/min/1.73m*2    Calcium 8.7 8.6 - 10.3 mg/dL   SST TOP   Result Value Ref Range    Extra Tube Hold for add-ons.    SST TOP   Result Value Ref Range    Extra Tube Hold for add-ons.    Lactate   Result Value Ref Range    Lactate 1.4 0.4 - 2.0 mmol/L       CT angio chest for pulmonary  embolism   Final Result   Small pulmonary embolus distal right upper lobar branch as described        MACRO:   None        Signed by: Carlos Feldman 9/12/2024 12:07 PM   Dictation workstation:   [id number]      CT chest wo IV contrast   Final Result   1. No acute traumatic injury to the chest.   2.  There is some mild bronchial opacification in the lower lobes   bilaterally suggestive underlying bronchitis and/or mucous plugging.   This finding is new since prior exam.   Signed by Gordon Blackmon MD      XR chest 2 views   Final Result   1. No acute pulmonary pathology.   2. Stable 1 cm density in the mid left lung. This may represent a   granuloma.   Signed by Chris Sims MD      Transthoracic Echo (TTE) Complete    (Results Pending)       Transthoracic Echo (TTE) Complete    Result Date: 11/26/2023   [facility], [address] [place] [address]              Tel [phone] and Fax [phone] TRANSTHORACIC ECHOCARDIOGRAM REPORT  Patient Name:      MELISSA ZAMUDIO     Reading Physician:    [id number] Eamon Kirby MD Study Date:        11/22/2023           Ordering Provider:    [id number] LEANNA ANDERSON MRN/PID:           [id number]             Fellow: Accession#:        [id number]         Nurse: Date of Birth/Age: 11/11/1933 / 89      Sonographer:          Wilver Sun RDCS                    years Gender:            M                    Additional Staff: Height:            180.34 cm            Admit Date:           11/22/2023 Weight:            73.48 kg             Admission Status:     Inpatient -                                                               Routine BSA:               1.93 m2              Encounter#:           [id number]                                         Department Location:  [facility]                                                                Department Blood Pressure: 153 /64 mmHg Study Type:    TRANSTHORACIC ECHO (TTE) COMPLETE Diagnosis/ICD: Chronic diastolic (congestive) heart failure (CHF)-I50.32 Indication:    Congestive Heart Failure CPT Code:      Echo Complete w Full Doppler-[id number] Patient History: Pertinent History: HTN, HLD, CVA, AS, CHF. Study Detail: The following Echo studies were performed: 2D, M-Mode, Doppler and               color flow.  PHYSICIAN INTERPRETATION: Left Ventricle: The left ventricular systolic function is moderately decreased, with an estimated ejection fraction of 35-40%. Wall motion is abnormal. The left ventricular cavity size is mildly dilated. There is mild concentric left ventricular hypertrophy. Findings are consistent with ischemic cardiomyopathy. Spectral Doppler shows an impaired relaxation pattern of left ventricular diastolic filling. LV Wall Scoring: The basal and mid inferior wall and basal and mid inferolateral wall are akinetic. The basal and mid anterolateral wall and apical inferior segment are hypokinetic. All remaining scored segments are normal. Left Atrium: The left atrium is normal in size. Right Ventricle: The right ventricle is normal in size. There is normal right ventricular global systolic function. Right Atrium: The right atrium is normal in size. Aortic Valve: The aortic valve is probably trileaflet. There is moderate to severe aortic valve cusp calcification. There is evidence of severe aortic valve stenosis. There is mild aortic valve regurgitation. The peak instantaneous gradient of the aortic valve is 59.6 mmHg. The mean gradient of the aortic valve is 33.0 mmHg. Mitral Valve: The mitral valve is mildly thickened. There is moderate mitral annular calcification. There is moderate mitral valve regurgitation which is eccentrically directed. 2 MR jets : one anteriorly and one posteriorly directed. Tricuspid Valve: The tricuspid valve is structurally normal. There is mild tricuspid  regurgitation. Pulmonic Valve: The pulmonic valve is structurally normal. There is mild pulmonic valve regurgitation. Pericardium: There is no pericardial effusion noted. Aorta: The aortic root is normal. Pulmonary Artery: The tricuspid regurgitant velocity is 2.74 m/s, and with an estimated right atrial pressure of 3 mmHg, the estimated pulmonary artery pressure is mildly elevated with the RVSP at 33.0 mmHg. Pulmonary Veins: The pulmonary veins appear normal and return normally to the left atrium. Systemic Veins: The inferior vena cava appears to be of normal size. There is IVC inspiratory collapse greater than 50%.  CONCLUSIONS:  1. Left ventricular systolic function is moderately decreased with a 35-40% estimated ejection fraction.  2. Basal and mid inferior wall, basal and mid inferolateral wall, basal and mid anterolateral wall, and apical inferior segment are abnormal.  3. Spectral Doppler shows an impaired relaxation pattern of left ventricular diastolic filling.  4. There is ischemic cardiomyopathy.  5. 2 MR jets : one anteriorly and one posteriorly directed.  6. There is moderate mitral annular calcification.  7. Moderate mitral valve regurgitation.  8. Severe aortic valve stenosis.  9. There is moderate to severe aortic valve cusp calcification. 10. Mild aortic valve regurgitation. 11. Mild pulmonary HTN, estimated CVP is normal. QUANTITATIVE DATA SUMMARY: 2D MEASUREMENTS:                    Normal Ranges: Ao Root d: 3.00 cm (2.0-3.7cm) LA VOLUME:                               Normal Ranges: LA Vol A4C:        50.8 ml    (22+/-6mL/m2) LA Vol A2C:        55.8 ml LA Vol BP:         54.2 ml LA Vol Index A4C:  26.4ml/m2 LA Vol Index A2C:  28.9 ml/m2 LA Vol Index BP:   28.1 ml/m2 LA Area A4C:       17.8 cm2 LA Area A2C:       19.0 cm2 LA Major Axis A4C: 5.3 cm LA Major Axis A2C: 5.5 cm RA VOLUME BY A/L METHOD:                               Normal Ranges: RA Vol A4C:        31.4 ml    (8.3-19.5ml) RA Vol Index  A4C:  16.3 ml/m2 RA Area A4C:       12.6 cm2 RA Major Axis A4C: 4.3 cm AORTA MEASUREMENTS:                    Normal Ranges: Asc Ao, d: 3.00 cm (2.1-3.4cm) LV SYSTOLIC FUNCTION BY 2D PLANIMETRY (MOD):                     Normal Ranges: EF-A4C View: 48.5 % (>=55%) EF-A2C View: 52.0 % EF-Biplane:  50.4 % LV DIASTOLIC FUNCTION:                               Normal Ranges: MV Peak E:        0.92 m/s    (0.7-1.2 m/s) MV Peak A:        1.47 m/s    (0.42-0.7 m/s) E/A Ratio:        0.63        (1.0-2.2) MV e'             0.06 m/s    (>8.0) MV lateral e'     0.07 m/s MV medial e'      0.06 m/s E/e' Ratio:       14.18       (<8.0) PulmV Sys Josep:    56.20 cm/s PulmV Cohen Josep:   32.10 cm/s PulmV S/D Josep:    1.80 PulmV A Revs Josep: 58.60 cm/s PulmV A Revs Dur: 123.00 msec MITRAL VALVE:                 Normal Ranges: MV DT: 175 msec (150-240msec) MITRAL INSUFFICIENCY:                           Normal Ranges: PISA Radius:  0.7 cm MR VTI:       198.00 cm MR Vmax:      621.00 cm/s MR Alias Josep: 38.5 cm/s MR Volume:    37.79 ml MR Flow Rt:   118.53 ml/s MR EROA:      0.19 cm2 AORTIC VALVE:                                    Normal Ranges: AoV Vmax:                3.86 m/s  (<=1.7m/s) AoV Peak P.6 mmHg (<20mmHg) AoV Mean P.0 mmHg (1.7-11.5mmHg) LVOT Max Josep:            1.10 m/s  (<=1.1m/s) AoV VTI:                 87.10 cm  (18-25cm) LVOT VTI:                22.30 cm LVOT Diameter:           2.00 cm   (1.8-2.4cm) AoV Area, VTI:           0.80 cm2  (2.5-5.5cm2) AoV Area,Vmax:           0.90 cm2  (2.5-4.5cm2) AoV Dimensionless Index: 0.26  RIGHT VENTRICLE: RV Basal 2.60 cm RV Mid   1.20 cm RV Major 6.4 cm TAPSE:   24.8 mm RV s'    0.11 m/s TRICUSPID VALVE/RVSP:                             Normal Ranges: Peak TR Velocity: 2.74 m/s RV Syst Pressure: 33.0 mmHg (< 30mmHg) IVC Diam:         1.80 cm Pulmonary Veins: PulmV A Revs Dur: 123.00 msec PulmV A Revs Josep: 58.60 cm/s PulmV Cohen Josep:   32.10 cm/s PulmV  S/D Josep:    1.80 PulmV Sys Josep:    56.20 cm/s  [id number] Eamon Kirby MD Electronically signed on 11/26/2023 at 3:16:08 PM  Wall Scoring  ** Final **         Assessment/Plan   Bronchitis  -CXR showed negative  -CT chest showed bronchitis, mucous plugging  -antibiotics  -bronchodilators  -oxygen support  -sputum culture    2. Acute Pulmonary emboli  -CTA of the chest reviewed  -Started on heparin gtt and now transitioned to Eliquis  -Check echo  -Currently on room air    3. S/p TAVR  -March 2024 at [facility]  -Check echo    4. Chronic diastolic/systolic chf  -appears euvolemic  -  -2gm na diet  -eplerenone, furosemide, metoprolol succinate   -Check echo    5. Hypertension  -stable  -2gm na diet  -cont meds  -monitor    6. Hyperlipidemia  -Cont statin          Martita Valentino, VELMA-CNP

## 2024-09-13 NOTE — CARE PLAN
The patient's goals for the shift include  Get some rest tonight    The clinical goals for the shift include Pt will have no reports of SOB throughout shift. Will remain on RA.    Over the shift, the patient remained stable and free from injuries. VS WNL. Pt pleasant and cooperative. Pt had no increased c/o of SOB. Pt's wife remained at bedside all night

## 2024-09-13 NOTE — PROGRESS NOTES
09/13/24 1213   Discharge Planning   Assistance Needed AAOX3. Independent in ADL's. No DME used at baseline. He no longer drives. His son or daughters provide transportation. His children also assist the patient and his wife with shopping. His wife does the cooking and cleaning. Patient does not feel that he will need any further services on discharge. He will be here for a few days for ATB. CT +PE, started on Apixaban. Consult cardiology and echo ordered.  DC Plan: Home no needs when medically ready.   Expected Discharge Disposition Home

## 2024-09-13 NOTE — CONSULTS
Patients name: Radhames Ken    Room # 235    COPD education not appropriate. Pt has no history of COPD and recently had a normal PFT.

## 2024-09-13 NOTE — CARE PLAN
The clinical goals for the shift include Patient will tolerate IV abx this shift.    Over the shift, the patient met the above goal, IV abx were tolerared well. Vitals remained stable this shift, no reports of pain. Patient has been up to bathroom and has sat at edge of the bed for meals. Appetite has been good. Currently sitting at edge of bed with call light in reach and wife at bedside.

## 2024-09-13 NOTE — PROGRESS NOTES
"Radhames Ken is a 89 y.o. male on day 1 of admission presenting with Shortness of breath.    Subjective     No overnight events reported.     Patient ambulatory in the room this morning, wife at bedside. He denies any cough, chest pain, or shortness of breath this morning. CT findings and new diagnosis of PE reviewed with patient and family, patient has been started on apixaban. Cardiology is consulted, echo to be completed today. Plan of care discussed with patient and wife, all questions answered.         Objective     Physical Exam  Constitutional:       General: He is not in acute distress.     Appearance: He is not toxic-appearing.   HENT:      Head: Normocephalic and atraumatic.      Mouth/Throat:      Mouth: Mucous membranes are moist.   Eyes:      Conjunctiva/sclera: Conjunctivae normal.   Cardiovascular:      Rate and Rhythm: Normal rate and regular rhythm.   Pulmonary:      Effort: No respiratory distress.      Breath sounds: Rhonchi present. No wheezing.      Comments: On room air   Abdominal:      General: There is no distension.      Palpations: Abdomen is soft.      Tenderness: There is no abdominal tenderness.   Musculoskeletal:      Right lower leg: No edema.      Left lower leg: No edema.   Skin:     General: Skin is warm and dry.   Neurological:      Mental Status: He is alert and oriented to person, place, and time.   Psychiatric:         Mood and Affect: Mood normal.         Behavior: Behavior normal.         Last Recorded Vitals  Blood pressure 145/76, pulse 89, temperature 36.2 °C (97.2 °F), temperature source Temporal, resp. rate 18, height 1.803 m (5' 11\"), weight 64.5 kg (142 lb 1.6 oz), SpO2 95%.  Intake/Output last 3 Shifts:  I/O last 3 completed shifts:  In: 1040 (16.1 mL/kg) [P.O.:240; IV Piggyback:800]  Out: - (0 mL/kg)   Weight: 64.5 kg     Relevant Results        Scheduled medications  apixaban, 10 mg, oral, BID   Followed by  [START ON 9/20/2024] apixaban, 5 mg, oral, " BID  aspirin, 81 mg, oral, Daily  atorvastatin, 40 mg, oral, Daily  azithromycin, 500 mg, intravenous, q24h  cefTRIAXone, 1 g, intravenous, q24h  eplerenone, 25 mg, oral, Daily  furosemide, 40 mg, oral, Daily  guaiFENesin, 600 mg, oral, BID  ipratropium-albuteroL, 3 mL, nebulization, TID  methylPREDNISolone sodium succinate (PF), 40 mg, intravenous, q12h  metoprolol succinate XL, 12.5 mg, oral, Daily  pantoprazole, 40 mg, oral, Daily before breakfast      Continuous medications  sodium chloride 0.9%, 10 mL/hr, Last Rate: 10 mL/hr (09/13/24 1148)      PRN medications  PRN medications: acetaminophen, acetaminophen, albuterol, benzocaine-menthol, benzonatate, ondansetron, polyethylene glycol, sodium chloride 0.9%    Results for orders placed or performed during the hospital encounter of 09/11/24 (from the past 24 hour(s))   Procalcitonin   Result Value Ref Range    Procalcitonin 0.04 <=0.07 ng/mL   CBC   Result Value Ref Range    WBC 9.7 4.4 - 11.3 x10*3/uL    nRBC 0.0 0.0 - 0.0 /100 WBCs    RBC 4.33 (L) 4.50 - 5.90 x10*6/uL    Hemoglobin 13.1 (L) 13.5 - 17.5 g/dL    Hematocrit 39.0 (L) 41.0 - 52.0 %    MCV 90 80 - 100 fL    MCH 30.3 26.0 - 34.0 pg    MCHC 33.6 32.0 - 36.0 g/dL    RDW 13.3 11.5 - 14.5 %    Platelets 136 (L) 150 - 450 x10*3/uL   Basic Metabolic Panel   Result Value Ref Range    Glucose 160 (H) 74 - 99 mg/dL    Sodium 132 (L) 136 - 145 mmol/L    Potassium 4.6 3.5 - 5.3 mmol/L    Chloride 101 98 - 107 mmol/L    Bicarbonate 25 21 - 32 mmol/L    Anion Gap 11 10 - 20 mmol/L    Urea Nitrogen 29 (H) 6 - 23 mg/dL    Creatinine 1.21 0.50 - 1.30 mg/dL    eGFR 57 (L) >60 mL/min/1.73m*2    Calcium 8.7 8.6 - 10.3 mg/dL   SST TOP   Result Value Ref Range    Extra Tube Hold for add-ons.    SST TOP   Result Value Ref Range    Extra Tube Hold for add-ons.    Lactate   Result Value Ref Range    Lactate 1.4 0.4 - 2.0 mmol/L       CT angio chest for pulmonary embolism    Result Date: 9/12/2024  Interpreted By:  Francia  Carlos, STUDY: CT ANGIO CHEST FOR PULMONARY EMBOLISM;  9/12/2024 11:44 am   INDICATION: 89 y/o   M with  Signs/Symptoms:elevated d-dimer.   COMPARISON: None available.   ACCESSION NUMBER(S): [id number]   ORDERING CLINICIAN: CAMPBELL LUNSFORD   TECHNIQUE: CT images of the chest obtained following the administration of  75 ml of IV contrast (Omnipaque 350) in the pulmonary arterial phase of contrast. Axial and coronal MIP images, as well as coronal sagittal reformatted images generated and reviewed.   FINDINGS: LIMITATIONS/LINES:   None.   PULMONARY ARTERIES:   Small filling defect at distal right upper lobar artery with extension into origin of segmental branches. No other filling defects identified.   HEART:   Normal-size. Endovascular aortic valve prosthesis. No pericardial effusion.   MEDIASTINUM/LUIS MIGUEL:   Unremarkable.   PLEURA:   Segmental branches   LUNG PARENCHYMA:   Unremarkable.   BONES:   Unremarkable.   CHEST WALL/OTHER:   Unremarkable.       Small pulmonary embolus distal right upper lobar branch as described   MACRO: None   Signed by: Carlos Feldman 9/12/2024 12:07 PM Dictation workstation:   [id number]    CT chest wo IV contrast    Result Date: 9/11/2024  STUDY: CT Chest without IV Contrast; 09/11/2024 6:43 pm INDICATION: Cough.  Shortness of breath. COMPARISON: XR Chest 09/11/2024;  CT Chest 12/22/2023 and CTA Chest 11/22/2023. ACCESSION NUMBER(S): [id number] ORDERING CLINICIAN: ANGEL JEFFRIES TECHNIQUE:  CT of the chest was performed without contrast.  Automated mA/kV exposure control was utilized and patient examination was performed in strict accordance with principles of ALARA. FINDINGS: MEDIASTINUM: The heart is normal in size without pericardial effusion.  Coronary artery calcifications present.  Aortic valve replacement noted.  LUNGS/PLEURA: There is no pleural effusion, pleural thickening, or pneumothorax. The airways are patent. There some mild linear reticular nodular infiltrate right apex  anteriorly could represent scarring similar prior exam.  Subpleural calcification anterior lateral left lung noted likely prior trauma or infection with some scarring similar prior study.  There is some mild bronchial opacification in the subsegmental branches in the lower lobes bilaterally suggestive underlying bronchitis and/or mucous plugging.  This finding is new since prior exam.  The other findings are stable. LYMPH NODES: Thoracic lymph nodes are not enlarged. UPPER ABDOMEN: Pneumobilia.  Scattered hypodensities throughout the left hepatic lobe 1.5 cm in size not significantly changed probably cysts. BONES: There are no acute fractures.  No suspicious bony lesions.       1. No acute traumatic injury to the chest. 2.  There is some mild bronchial opacification in the lower lobes bilaterally suggestive underlying bronchitis and/or mucous plugging. This finding is new since prior exam. Signed by Gordon Blackmon MD    ECG 12 lead    Result Date: 9/11/2024  Sinus rhythm with occasional Premature ventricular complexes Left axis deviation Right bundle branch block Inferior infarct (cited on or before 22-NOV-2023) Abnormal ECG When compared with ECG of 22-NOV-2023 10:20, Right bundle branch block is now Present    XR chest 2 views    Result Date: 9/11/2024  STUDY: Chest Radiographs;  [INSERT month/day/year xx/x/xxxx and Time x:xx using AM or PM)] INDICATION: Coughing and wheezing. COMPARISON: None Available ACCESSION NUMBER(S): [id number] ORDERING CLINICIAN: LEANNA ANDERSON TECHNIQUE:  Frontal and lateral chest. FINDINGS: CARDIOMEDIASTINAL SILHOUETTE: Cardiomediastinal silhouette is normal in size and configuration.  LUNGS: Lungs are clear. There is a stable 1 cm density in the mid left lung.  ABDOMEN: No remarkable upper abdominal findings.  BONES: No acute osseous changes.    1. No acute pulmonary pathology. 2. Stable 1 cm density in the mid left lung. This may represent a granuloma. Signed by Chris Sims MD                   Assessment/Plan   Assessment & Plan  Shortness of breath    Bronchitis    Acute renal failure (ARF) (CMS-[facility])    Chronic combined systolic and diastolic heart failure (Multi)    S/P TAVR (transcatheter aortic valve replacement)    HTN (hypertension)    Coronary artery disease involving native coronary artery of native heart without angina pectoris    Dyspnea, unspecified type    Acute pulmonary embolism (Multi)    Lactic acidosis    Thrombocytopenia (CMS-HCC)    #Bronchitis (improving)   #Acute PE   #Lactic acidosis (resolved)   -Increasing SOB with coughing fits at home   -No hx of asthma/COPD; recent workup with pulm and PFTs WNL  -WBC 8.2 > 5.0 > 9.7   -Covid/flu/RSV neg   -Lactate 2.1 > 2.2 > 1.4   -Procal 0.04   -CXR: 1. No acute pulmonary pathology. 2. Stable 1 cm density in the mid left lung. This may represent a granuloma.  -CT Chest: 1. No acute traumatic injury to the chest. 2.  There is some mild bronchial opacification in the lower lobes bilaterally suggestive underlying bronchitis and/or mucous plugging.  -D-dimer 5648  -CT PE: Small pulmonary embolus distal right upper lobar branch as described   -Start apixaban 10 mg BID x 7 days then 5 mg BID   -Echo pending to rule out right heart strain  -IV azithro, ceftriaxone (Day 2)  -Solumedrol 40 mg IV q12h   -Mucinex BID  -Bronchodilators TID  -Tessalon Perles TID PRN for cough   -Stable on RA, continue to monitor SpO2   -Cardiology consulted for acute PE, appreciate recs      #Acute renal failure, resolved   -Bun/creat 20/1.38 >> 29/1.21 today   -Received 500ml bolus in ED  -Given CHF history, will hold off additional fluids and monitor renal function  -Renally dose medications as able  -Daily BMP     #Thrombocytopenia (improving)  - Plts 122 > 136  - Monitor for active bleeding on apixaban   - Daily CBC     #Chronic combined systolic and diastolic heart failure   #S/P TAVR (3/24)  #Essential HTN  #CAD  #HLD  -Echo (5/24): The left  ventricle is normal in size. There is mild left ventricular hypertrophy. Left ventricular systolic function is normal. EF = 53 ± 5% (2D biplane)   -Repeat echo pending in setting of acute PE   -Trop 20 > 18  -, appears euvolemic on exam   -Continue ASA, atorvastatin, eplerenone, furosemide, metoprolol succinate   -2g Na diet     DVT PPx: Apixaban   GI PPx: Protonix   Diet: 2g Na   Code Status: Full     Disposition: Patient requires inpatient management at this time.          Melissa Dominguez PA-C

## 2024-09-14 LAB
ANION GAP SERPL CALC-SCNC: 14 MMOL/L (ref 10–20)
BUN SERPL-MCNC: 30 MG/DL (ref 6–23)
CALCIUM SERPL-MCNC: 8.3 MG/DL (ref 8.6–10.3)
CHLORIDE SERPL-SCNC: 101 MMOL/L (ref 98–107)
CO2 SERPL-SCNC: 20 MMOL/L (ref 21–32)
CREAT SERPL-MCNC: 1.21 MG/DL (ref 0.5–1.3)
EGFRCR SERPLBLD CKD-EPI 2021: 57 ML/MIN/1.73M*2
ERYTHROCYTE [DISTWIDTH] IN BLOOD BY AUTOMATED COUNT: 13.2 % (ref 11.5–14.5)
GLUCOSE SERPL-MCNC: 145 MG/DL (ref 74–99)
HCT VFR BLD AUTO: 39.2 % (ref 41–52)
HGB BLD-MCNC: 13.1 G/DL (ref 13.5–17.5)
MCH RBC QN AUTO: 30.3 PG (ref 26–34)
MCHC RBC AUTO-ENTMCNC: 33.4 G/DL (ref 32–36)
MCV RBC AUTO: 91 FL (ref 80–100)
NRBC BLD-RTO: 0 /100 WBCS (ref 0–0)
PLATELET # BLD AUTO: 141 X10*3/UL (ref 150–450)
POTASSIUM SERPL-SCNC: 4.1 MMOL/L (ref 3.5–5.3)
RBC # BLD AUTO: 4.33 X10*6/UL (ref 4.5–5.9)
SODIUM SERPL-SCNC: 131 MMOL/L (ref 136–145)
WBC # BLD AUTO: 8.7 X10*3/UL (ref 4.4–11.3)

## 2024-09-14 PROCEDURE — 36415 COLL VENOUS BLD VENIPUNCTURE: CPT | Mod: IPSPLIT

## 2024-09-14 PROCEDURE — 2500000004 HC RX 250 GENERAL PHARMACY W/ HCPCS (ALT 636 FOR OP/ED): Mod: IPSPLIT | Performed by: NURSE PRACTITIONER

## 2024-09-14 PROCEDURE — 99232 SBSQ HOSP IP/OBS MODERATE 35: CPT | Performed by: NURSE PRACTITIONER

## 2024-09-14 PROCEDURE — 2500000002 HC RX 250 W HCPCS SELF ADMINISTERED DRUGS (ALT 637 FOR MEDICARE OP, ALT 636 FOR OP/ED): Mod: IPSPLIT

## 2024-09-14 PROCEDURE — 82374 ASSAY BLOOD CARBON DIOXIDE: CPT | Mod: IPSPLIT

## 2024-09-14 PROCEDURE — 80048 BASIC METABOLIC PNL TOTAL CA: CPT | Mod: IPSPLIT

## 2024-09-14 PROCEDURE — 2500000001 HC RX 250 WO HCPCS SELF ADMINISTERED DRUGS (ALT 637 FOR MEDICARE OP): Mod: IPSPLIT | Performed by: NURSE PRACTITIONER

## 2024-09-14 PROCEDURE — 2500000001 HC RX 250 WO HCPCS SELF ADMINISTERED DRUGS (ALT 637 FOR MEDICARE OP): Mod: IPSPLIT

## 2024-09-14 PROCEDURE — 1100000001 HC PRIVATE ROOM DAILY: Mod: IPSPLIT

## 2024-09-14 PROCEDURE — 2500000004 HC RX 250 GENERAL PHARMACY W/ HCPCS (ALT 636 FOR OP/ED): Mod: IPSPLIT

## 2024-09-14 PROCEDURE — 94760 N-INVAS EAR/PLS OXIMETRY 1: CPT | Mod: IPSPLIT

## 2024-09-14 PROCEDURE — 85027 COMPLETE CBC AUTOMATED: CPT | Mod: IPSPLIT

## 2024-09-14 PROCEDURE — 9420000001 HC RT PATIENT EDUCATION 5 MIN: Mod: IPSPLIT

## 2024-09-14 PROCEDURE — 94640 AIRWAY INHALATION TREATMENT: CPT | Mod: IPSPLIT

## 2024-09-14 RX ORDER — PREDNISONE 20 MG/1
40 TABLET ORAL DAILY
Status: DISCONTINUED | OUTPATIENT
Start: 2024-09-14 | End: 2024-09-16 | Stop reason: HOSPADM

## 2024-09-14 RX ADMIN — IPRATROPIUM BROMIDE AND ALBUTEROL SULFATE 3 ML: 2.5; .5 SOLUTION RESPIRATORY (INHALATION) at 16:28

## 2024-09-14 RX ADMIN — IPRATROPIUM BROMIDE AND ALBUTEROL SULFATE 3 ML: 2.5; .5 SOLUTION RESPIRATORY (INHALATION) at 11:15

## 2024-09-14 RX ADMIN — CEFUROXIME AXETIL 500 MG: 250 TABLET, FILM COATED ORAL at 20:38

## 2024-09-14 RX ADMIN — EPLERENONE 25 MG: 25 TABLET, FILM COATED ORAL at 08:57

## 2024-09-14 RX ADMIN — GUAIFENESIN 600 MG: 600 TABLET, EXTENDED RELEASE ORAL at 20:38

## 2024-09-14 RX ADMIN — METHYLPREDNISOLONE SODIUM SUCCINATE 40 MG: 40 INJECTION, POWDER, FOR SOLUTION INTRAMUSCULAR; INTRAVENOUS at 06:19

## 2024-09-14 RX ADMIN — ASPIRIN 81 MG CHEWABLE TABLET 81 MG: 81 TABLET CHEWABLE at 08:57

## 2024-09-14 RX ADMIN — PANTOPRAZOLE SODIUM 40 MG: 40 TABLET, DELAYED RELEASE ORAL at 06:19

## 2024-09-14 RX ADMIN — ATORVASTATIN CALCIUM 40 MG: 40 TABLET, FILM COATED ORAL at 08:57

## 2024-09-14 RX ADMIN — CEFUROXIME AXETIL 500 MG: 250 TABLET, FILM COATED ORAL at 08:57

## 2024-09-14 RX ADMIN — PREDNISONE 40 MG: 20 TABLET ORAL at 12:48

## 2024-09-14 RX ADMIN — APIXABAN 10 MG: 5 TABLET, FILM COATED ORAL at 20:38

## 2024-09-14 RX ADMIN — FUROSEMIDE 40 MG: 40 TABLET ORAL at 06:18

## 2024-09-14 RX ADMIN — APIXABAN 10 MG: 5 TABLET, FILM COATED ORAL at 08:57

## 2024-09-14 RX ADMIN — METOPROLOL SUCCINATE 12.5 MG: 25 TABLET, EXTENDED RELEASE ORAL at 06:18

## 2024-09-14 RX ADMIN — GUAIFENESIN 600 MG: 600 TABLET, EXTENDED RELEASE ORAL at 08:57

## 2024-09-14 RX ADMIN — IPRATROPIUM BROMIDE AND ALBUTEROL SULFATE 3 ML: 2.5; .5 SOLUTION RESPIRATORY (INHALATION) at 21:31

## 2024-09-14 ASSESSMENT — COGNITIVE AND FUNCTIONAL STATUS - GENERAL
CLIMB 3 TO 5 STEPS WITH RAILING: A LITTLE
STANDING UP FROM CHAIR USING ARMS: A LITTLE
TOILETING: A LITTLE
MOBILITY SCORE: 20
DAILY ACTIVITIY SCORE: 23
MOVING TO AND FROM BED TO CHAIR: A LITTLE
WALKING IN HOSPITAL ROOM: A LITTLE

## 2024-09-14 ASSESSMENT — PAIN SCALES - GENERAL
PAINLEVEL_OUTOF10: 0 - NO PAIN
PAINLEVEL_OUTOF10: 0 - NO PAIN

## 2024-09-14 ASSESSMENT — PAIN - FUNCTIONAL ASSESSMENT: PAIN_FUNCTIONAL_ASSESSMENT: 0-10

## 2024-09-14 NOTE — PROGRESS NOTES
Radhames Ken is a 89 y.o. male on day 2 of admission presenting with Shortness of breath.      Subjective     Radhames was laying down in bed with wife at bedside. He denies having any current shortness of breath, but wife is very concerned about his wheezing. Questions answered about bronchitis and treatment plan. Echocardiogram results were reviewed with them.     Review of systems:  Constitutional: negative for fever, chills, or malaise  Neuro: negative for dizziness, headache, numbness, tingling  ENT: Negative for nasal congestion or sore throat  Resp: negative for shortness of breath, positive for  cough and wheezing  CV: negative for chest pain, palpitations  GI: negative for abd pain, nausea, vomiting or diarrhea  : negative for dysuria, frequency, or urgency  Skin: negative for lesions, wounds, or rash  Musculoskeletal: Negative for weakness, myalgia, or arthralgia  Endocrine: Negative for polyuria or polydipsia    Objective   Constitutional: Well developed, awake/alert/oriented x3, no distress, alert and cooperative  Eyes: PERRL, EOMI, clear sclera  ENMT: mucous membranes moist, no apparent injury, no lesions seen  Head/Neck: Neck supple, no apparent injury, thyroid without mass or tenderness, No JVD, trachea midline, no bruits  Respiratory/Thorax: Patent airways, CTAB, normal breath sounds with good chest expansion, thorax symmetric  Cardiovascular: Regular, rate and rhythm, no murmurs, 2+ equal pulses of the extremities, normal S 1and S 2  Gastrointestinal: Nondistended, soft, non-tender, no rebound tenderness or guarding, no masses palpable, no organomegaly, +BS, no bruits  Musculoskeletal: ROM intact, no joint swelling, normal strength  Extremities: normal extremities, no cyanosis edema, contusions or wounds, no clubbing  Neurological: alert and oriented x3, intact senses, motor, response and reflexes, normal strength  Lymphatic: No significant lymphadenopathy  Psychological: Appropriate mood and  "behavior  Skin: Warm and dry, no lesions, no rashes      Last Recorded Vitals  /82 (BP Location: Left arm, Patient Position: Lying)   Pulse 79   Temp 36.4 °C (97.5 °F) (Temporal)   Resp 16   Ht 1.803 m (5' 11\")   Wt 64.5 kg (142 lb 1.6 oz)   SpO2 93%   BMI 19.82 kg/m²     Intake/Output last 3 Shifts:  I/O last 3 completed shifts:  In: 395 (6.1 mL/kg) [P.O.:240; I.V.:105 (1.6 mL/kg); IV Piggyback:50]  Out: - (0 mL/kg)   Weight: 64.5 kg   I/O this shift:  In: 360 [P.O.:360]  Out: -     Relevant Results  Scheduled medications  apixaban, 10 mg, oral, BID   Followed by  [START ON 9/20/2024] apixaban, 5 mg, oral, BID  aspirin, 81 mg, oral, Daily  atorvastatin, 40 mg, oral, Daily  cefuroxime, 500 mg, oral, BID  eplerenone, 25 mg, oral, Daily  furosemide, 40 mg, oral, Daily  guaiFENesin, 600 mg, oral, BID  ipratropium-albuteroL, 3 mL, nebulization, TID  methylPREDNISolone sodium succinate (PF), 40 mg, intravenous, q12h  metoprolol succinate XL, 12.5 mg, oral, Daily  pantoprazole, 40 mg, oral, Daily before breakfast      Continuous medications  sodium chloride 0.9%, 10 mL/hr, Last Rate: 10 mL/hr (09/14/24 0549)      PRN medications  PRN medications: acetaminophen, acetaminophen, albuterol, benzocaine-menthol, benzonatate, ondansetron, polyethylene glycol, sodium chloride 0.9%    Results for orders placed or performed during the hospital encounter of 09/11/24 (from the past 24 hour(s))   Transthoracic Echo (TTE) Complete   Result Value Ref Range    AV pk saritha 2.32 m/s    AV mn grad 13.0 mmHg    LVOT diam 2.30 cm    MV E/A ratio 0.72     Tricuspid annular plane systolic excursion 2.5 cm    LV EF 58 %    RV free wall pk S' 14.10 cm/s    RVSP 27.0 mmHg    Aortic Valve Area by Continuity of VTI 2.44 cm2    Aortic Valve Area by Continuity of Peak Velocity 2.26 cm2    AV pk grad 21.5 mmHg    LV A4C EF 45.6    Basic Metabolic Panel   Result Value Ref Range    Glucose 145 (H) 74 - 99 mg/dL    Sodium 131 (L) 136 - 145 " mmol/L    Potassium 4.1 3.5 - 5.3 mmol/L    Chloride 101 98 - 107 mmol/L    Bicarbonate 20 (L) 21 - 32 mmol/L    Anion Gap 14 10 - 20 mmol/L    Urea Nitrogen 30 (H) 6 - 23 mg/dL    Creatinine 1.21 0.50 - 1.30 mg/dL    eGFR 57 (L) >60 mL/min/1.73m*2    Calcium 8.3 (L) 8.6 - 10.3 mg/dL   CBC   Result Value Ref Range    WBC 8.7 4.4 - 11.3 x10*3/uL    nRBC 0.0 0.0 - 0.0 /100 WBCs    RBC 4.33 (L) 4.50 - 5.90 x10*6/uL    Hemoglobin 13.1 (L) 13.5 - 17.5 g/dL    Hematocrit 39.2 (L) 41.0 - 52.0 %    MCV 91 80 - 100 fL    MCH 30.3 26.0 - 34.0 pg    MCHC 33.4 32.0 - 36.0 g/dL    RDW 13.2 11.5 - 14.5 %    Platelets 141 (L) 150 - 450 x10*3/uL       Transthoracic Echo (TTE) Complete   Final Result      CT angio chest for pulmonary embolism   Final Result   Small pulmonary embolus distal right upper lobar branch as described        MACRO:   None        Signed by: Carlos Feldman 9/12/2024 12:07 PM   Dictation workstation:   [id number]      CT chest wo IV contrast   Final Result   1. No acute traumatic injury to the chest.   2.  There is some mild bronchial opacification in the lower lobes   bilaterally suggestive underlying bronchitis and/or mucous plugging.   This finding is new since prior exam.   Signed by Gordon Blackmon MD      XR chest 2 views   Final Result   1. No acute pulmonary pathology.   2. Stable 1 cm density in the mid left lung. This may represent a   granuloma.   Signed by Chris Sims MD          Transthoracic Echo (TTE) Complete    Result Date: 9/13/2024   [facility], [address] [address]              Tel [phone] and Fax [phone] TRANSTHORACIC ECHOCARDIOGRAM REPORT  Patient Name:      MELISSAROBERT ZAMUDIO     Reading Physician:    [id number] Eamon Kirby MD Study Date:        9/13/2024            Ordering Provider:    [id number] NOEMY MCNEAL MRN/PID:            [id number]             Fellow: Accession#:        [id number]         Nurse: Date of Birth/Age: 11/11/1933 / 89      Sonographer:          Zuleyka Lopez RDCS                    years Gender:            M                    Additional Staff: Height:            180.34 cm            Admit Date:           9/11/2024 Weight:            64.41 kg             Admission Status:     Inpatient -                                                               Routine BSA / BMI:         1.82 m2 / 19.81      Encounter#:           [id number]                    kg/m2 Blood Pressure:    120/71 mmHg          Department Location:  [facility] Study Type:    TRANSTHORACIC ECHO (TTE) COMPLETE Diagnosis/ICD: Other pulmonary embolism without acute cor pulmonale-I26.99 Indication:    small PE CPT Code:      Echo Complete w Full Doppler-00130 Patient History: Pertinent History: PE, HTN, HLD, CHF, CAD, SOB, AS s/p TAVR 26 singh 3 crystal                    3/20/23. Study Detail: The following Echo studies were performed: 2D, M-Mode, Doppler and               color flow. Technically challenging study due to prominent lung               artifact.  PHYSICIAN INTERPRETATION: Left Ventricle: The left ventricular systolic function is normal, with a visually estimated ejection fraction of 55-60%. There are no regional left ventricular wall motion abnormalities. The left ventricular cavity size is normal. There is mild left ventricular hypertrophy involving the septal wall. Spectral Doppler shows an impaired relaxation pattern of left ventricular diastolic filling. Left Atrium: The left atrium is mildly dilated. Right Ventricle: The right ventricle is normal in size. There is normal right ventricular global systolic function. Right Atrium: The right atrium is normal in size. Aortic Valve: There is a prosthetic aortic valve present. The aortic valve dimensionless index is  0.59. There is transcatheter aortic valve replacement. Echo findings are consistent with normal aortic valve prosthesis structure and function. There is no evidence of aortic valve regurgitation. The peak instantaneous gradient of the aortic valve is 21.5 mmHg. The mean gradient of the aortic valve is 13.0 mmHg. Mitral Valve: The mitral valve is mildly thickened. There is moderate mitral valve regurgitation which is anteriorly directed. There is flow reversal in the right pulmonary vein. Tricuspid Valve: The tricuspid valve is structurally normal. There is mild tricuspid regurgitation. Pulmonic Valve: The pulmonic valve is structurally normal. There is physiologic pulmonic valve regurgitation. Pericardium: There is no pericardial effusion noted. Aorta: The aortic root is normal. Pulmonary Artery: The tricuspid regurgitant velocity is 2.45 m/s, and with an estimated right atrial pressure of 3 mmHg, the estimated pulmonary artery pressure is borderline elevated with the RVSP at 27.0 mmHg. Systemic Veins: The inferior vena cava appears to be of normal size, IVC inspiratory collapse greater than 50%.  CONCLUSIONS:  1. The left ventricular systolic function is normal, with a visually estimated ejection fraction of 55-60%.  2. Spectral Doppler shows an impaired relaxation pattern of left ventricular diastolic filling.  3. There is normal right ventricular global systolic function.  4. The left atrium is mildly dilated.  5. Moderate mitral valve regurgitation.  6. There is transcatheter aortic valve replacement.  7. Echo findings are consistent with normal aortic valve prosthesis structure and function. QUANTITATIVE DATA SUMMARY:  2D MEASUREMENTS:          Normal Ranges: Ao Root d:       2.50 cm  (2.0-3.7cm) LVEDV Index:     73 ml/m2  LV SYSTOLIC FUNCTION BY 2D PLANIMETRY (MOD):                      Normal Ranges: EF-A4C View:    46 % (>=55%) EF-A2C View:    51 % EF-Biplane:     49 % EF-Visual:      58 % LV EF Reported: 58  %  LV DIASTOLIC FUNCTION:             Normal Ranges: MV Peak E:             1.01 m/s    (0.7-1.2 m/s) MV Peak A:             1.41 m/s    (0.42-0.7 m/s) E/A Ratio:             0.72        (1.0-2.2) MV e'                  0.095 m/s   (>8.0) MV lateral e'          0.10 m/s MV medial e'           0.09 m/s MV A Dur:              127.00 msec E/e' Ratio:            10.61       (<8.0) MV DT:                 174 msec    (150-240 msec)  MITRAL VALVE:          Normal Ranges: MV DT:        174 msec (150-240msec)  MITRAL INSUFFICIENCY:             Normal Ranges: PISA Radius:          0.5 cm MR VTI:               98.80 cm MR Vmax:              339.00 cm/s  AORTIC VALVE:                      Normal Ranges: AoV Vmax:                2.32 m/s  (<=1.7m/s) AoV Peak P.5 mmHg (<20mmHg) AoV Mean P.0 mmHg (1.7-11.5mmHg) LVOT Max Josep:            1.26 m/s  (<=1.1m/s) AoV VTI:                 46.20 cm  (18-25cm) LVOT VTI:                27.10 cm LVOT Diameter:           2.30 cm   (1.8-2.4cm) AoV Area, VTI:           2.44 cm2  (2.5-5.5cm2) AoV Area,Vmax:           2.26 cm2  (2.5-4.5cm2) AoV Dimensionless Index: 0.59  RIGHT VENTRICLE: TAPSE: 25.0 mm RV s'  0.14 m/s  TRICUSPID VALVE/RVSP:          Normal Ranges: Peak TR Velocity:     2.45 m/s RV Syst Pressure:     27 mmHg  (< 30mmHg) IVC Diam:             1.82 cm  [id number] Eamon Kirby MD Electronically signed on 2024 at 6:58:40 PM  ** Final **     Transthoracic Echo (TTE) Complete    Result Date: 2023   [facility], [address] [place] [address]              Tel [phone] and Fax [phone] TRANSTHORACIC ECHOCARDIOGRAM REPORT  Patient Name:      MELISSA Kelly Physician:    [id number] Eamon Kirby MD Study Date:        2023           Ordering Provider:    [id number] WOO                                                                JUSTIN MRN/PID:           [id number]             Fellow: Accession#:        [id number]         Nurse: Date of Birth/Age: 11/11/1933 / 89      Sonographer:          Wilver Sun RDCS                    years Gender:            M                    Additional Staff: Height:            180.34 cm            Admit Date:           11/22/2023 Weight:            73.48 kg             Admission Status:     Inpatient -                                                               Routine BSA:               1.93 m2              Encounter#:           [id number]                                         Department Location:  [facility]                                                               Department Blood Pressure: 153 /64 mmHg Study Type:    TRANSTHORACIC ECHO (TTE) COMPLETE Diagnosis/ICD: Chronic diastolic (congestive) heart failure (CHF)-I50.32 Indication:    Congestive Heart Failure CPT Code:      Echo Complete w Full Doppler-46914 Patient History: Pertinent History: HTN, HLD, CVA, AS, CHF. Study Detail: The following Echo studies were performed: 2D, M-Mode, Doppler and               color flow.  PHYSICIAN INTERPRETATION: Left Ventricle: The left ventricular systolic function is moderately decreased, with an estimated ejection fraction of 35-40%. Wall motion is abnormal. The left ventricular cavity size is mildly dilated. There is mild concentric left ventricular hypertrophy. Findings are consistent with ischemic cardiomyopathy. Spectral Doppler shows an impaired relaxation pattern of left ventricular diastolic filling. LV Wall Scoring: The basal and mid inferior wall and basal and mid inferolateral wall are akinetic. The basal and mid anterolateral wall and apical inferior segment are hypokinetic. All remaining scored segments are normal. Left Atrium: The left atrium is normal in size. Right Ventricle: The right ventricle is normal in size. There is normal right ventricular global systolic function. Right Atrium:  The right atrium is normal in size. Aortic Valve: The aortic valve is probably trileaflet. There is moderate to severe aortic valve cusp calcification. There is evidence of severe aortic valve stenosis. There is mild aortic valve regurgitation. The peak instantaneous gradient of the aortic valve is 59.6 mmHg. The mean gradient of the aortic valve is 33.0 mmHg. Mitral Valve: The mitral valve is mildly thickened. There is moderate mitral annular calcification. There is moderate mitral valve regurgitation which is eccentrically directed. 2 MR jets : one anteriorly and one posteriorly directed. Tricuspid Valve: The tricuspid valve is structurally normal. There is mild tricuspid regurgitation. Pulmonic Valve: The pulmonic valve is structurally normal. There is mild pulmonic valve regurgitation. Pericardium: There is no pericardial effusion noted. Aorta: The aortic root is normal. Pulmonary Artery: The tricuspid regurgitant velocity is 2.74 m/s, and with an estimated right atrial pressure of 3 mmHg, the estimated pulmonary artery pressure is mildly elevated with the RVSP at 33.0 mmHg. Pulmonary Veins: The pulmonary veins appear normal and return normally to the left atrium. Systemic Veins: The inferior vena cava appears to be of normal size. There is IVC inspiratory collapse greater than 50%.  CONCLUSIONS:  1. Left ventricular systolic function is moderately decreased with a 35-40% estimated ejection fraction.  2. Basal and mid inferior wall, basal and mid inferolateral wall, basal and mid anterolateral wall, and apical inferior segment are abnormal.  3. Spectral Doppler shows an impaired relaxation pattern of left ventricular diastolic filling.  4. There is ischemic cardiomyopathy.  5. 2 MR jets : one anteriorly and one posteriorly directed.  6. There is moderate mitral annular calcification.  7. Moderate mitral valve regurgitation.  8. Severe aortic valve stenosis.  9. There is moderate to severe aortic valve cusp  calcification. 10. Mild aortic valve regurgitation. 11. Mild pulmonary HTN, estimated CVP is normal. QUANTITATIVE DATA SUMMARY: 2D MEASUREMENTS:                    Normal Ranges: Ao Root d: 3.00 cm (2.0-3.7cm) LA VOLUME:                               Normal Ranges: LA Vol A4C:        50.8 ml    (22+/-6mL/m2) LA Vol A2C:        55.8 ml LA Vol BP:         54.2 ml LA Vol Index A4C:  26.4ml/m2 LA Vol Index A2C:  28.9 ml/m2 LA Vol Index BP:   28.1 ml/m2 LA Area A4C:       17.8 cm2 LA Area A2C:       19.0 cm2 LA Major Axis A4C: 5.3 cm LA Major Axis A2C: 5.5 cm RA VOLUME BY A/L METHOD:                               Normal Ranges: RA Vol A4C:        31.4 ml    (8.3-19.5ml) RA Vol Index A4C:  16.3 ml/m2 RA Area A4C:       12.6 cm2 RA Major Axis A4C: 4.3 cm AORTA MEASUREMENTS:                    Normal Ranges: Asc Ao, d: 3.00 cm (2.1-3.4cm) LV SYSTOLIC FUNCTION BY 2D PLANIMETRY (MOD):                     Normal Ranges: EF-A4C View: 48.5 % (>=55%) EF-A2C View: 52.0 % EF-Biplane:  50.4 % LV DIASTOLIC FUNCTION:                               Normal Ranges: MV Peak E:        0.92 m/s    (0.7-1.2 m/s) MV Peak A:        1.47 m/s    (0.42-0.7 m/s) E/A Ratio:        0.63        (1.0-2.2) MV e'             0.06 m/s    (>8.0) MV lateral e'     0.07 m/s MV medial e'      0.06 m/s E/e' Ratio:       14.18       (<8.0) PulmV Sys Josep:    56.20 cm/s PulmV Cohen Josep:   32.10 cm/s PulmV S/D Josep:    1.80 PulmV A Revs Josep: 58.60 cm/s PulmV A Revs Dur: 123.00 msec MITRAL VALVE:                 Normal Ranges: MV DT: 175 msec (150-240msec) MITRAL INSUFFICIENCY:                           Normal Ranges: PISA Radius:  0.7 cm MR VTI:       198.00 cm MR Vmax:      621.00 cm/s MR Alias Josep: 38.5 cm/s MR Volume:    37.79 ml MR Flow Rt:   118.53 ml/s MR EROA:      0.19 cm2 AORTIC VALVE:                                    Normal Ranges: AoV Vmax:                3.86 m/s  (<=1.7m/s) AoV Peak P.6 mmHg (<20mmHg) AoV Mean P.0  mmHg (1.7-11.5mmHg) LVOT Max Josep:            1.10 m/s  (<=1.1m/s) AoV VTI:                 87.10 cm  (18-25cm) LVOT VTI:                22.30 cm LVOT Diameter:           2.00 cm   (1.8-2.4cm) AoV Area, VTI:           0.80 cm2  (2.5-5.5cm2) AoV Area,Vmax:           0.90 cm2  (2.5-4.5cm2) AoV Dimensionless Index: 0.26  RIGHT VENTRICLE: RV Basal 2.60 cm RV Mid   1.20 cm RV Major 6.4 cm TAPSE:   24.8 mm RV s'    0.11 m/s TRICUSPID VALVE/RVSP:                             Normal Ranges: Peak TR Velocity: 2.74 m/s RV Syst Pressure: 33.0 mmHg (< 30mmHg) IVC Diam:         1.80 cm Pulmonary Veins: PulmV A Revs Dur: 123.00 msec PulmV A Revs Josep: 58.60 cm/s PulmV Cohen Josep:   32.10 cm/s PulmV S/D Josep:    1.80 PulmV Sys Josep:    56.20 cm/s  [id number] Eamon Kirby MD Electronically signed on 11/26/2023 at 3:16:08 PM  Wall Scoring  ** Final **           Assessment/Plan   Principal Problem:    Shortness of breath  Active Problems:    HTN (hypertension)    Hyperlipemia    Bronchitis    Acute renal failure (ARF) (CMS-[facility])    Chronic combined systolic and diastolic heart failure (Multi)    S/P TAVR (transcatheter aortic valve replacement)    Coronary artery disease involving native coronary artery of native heart without angina pectoris    Dyspnea, unspecified type    Acute pulmonary embolism (Multi)    Lactic acidosis    Thrombocytopenia (CMS-[facility])    Bronchitis  -CXR showed negative  -CT chest showed bronchitis, mucous plugging  -antibiotics  -bronchodilators  -oxygen support  -sputum culture     2. Acute Pulmonary emboli  -CTA of the chest reviewed  -Started on heparin gtt and now transitioned to Eliquis  -Echocardiogram reviewed as above, no evidence of right heart strain.   -Currently on room air     3. S/p TAVR  -March 2024 at [facility]  -Echocardiogram reviewed as above    4. Chronic diastolic/systolic chf  -appears euvolemic  -  -2gm na diet  -eplerenone, furosemide, metoprolol succinate   -Check echo     5.  Hypertension  -stable  -2gm na diet  -cont meds  -monitor     6. Hyperlipidemia  -Cont statin         VELMA Olson-CNP

## 2024-09-14 NOTE — NURSING NOTE
Assumed care of pt.  BSSR received. Pt. Awake, in bed, HOB elevated, spouse sleeping beside pt. In bed.  Pt. Denies pain, denies needs at this time.  Bed low, brake on, belongings and call light in reach.

## 2024-09-14 NOTE — CARE PLAN
The patient's goals for the shift include      The clinical goals for the shift include The patient's resp status will remain stable through out the shift

## 2024-09-14 NOTE — NURSING NOTE
Assumed patient care. No immediate needs at this time. Patient resting in bed. Call light within reach.

## 2024-09-14 NOTE — CARE PLAN
The patient's goals for the shift include      The clinical goals for the shift include The patient's resp status will remain stable through out the shift    Over the shift, the patient did not make progress toward the following goals. Barriers to progression include   Problem: Pain - Adult  Goal: Verbalizes/displays adequate comfort level or baseline comfort level  Outcome: Progressing     Problem: Discharge Planning  Goal: Discharge to home or other facility with appropriate resources  Outcome: Progressing   . Recommendations to address these barriers include .

## 2024-09-14 NOTE — PROGRESS NOTES
Radhames Ken is a 89 y.o. male on day 2 of admission presenting with Shortness of breath.      Subjective   Quiet on rounds - wife does most of the talking - pt does deny chest pain, acute sob, n/v/c/d       Objective     Last Recorded Vitals  /82 (BP Location: Left arm, Patient Position: Lying)   Pulse 79   Temp 36.4 °C (97.5 °F) (Temporal)   Resp 16   Wt 64.5 kg (142 lb 1.6 oz)   SpO2 93%   Intake/Output last 3 Shifts:    Intake/Output Summary (Last 24 hours) at 9/14/2024 1423  Last data filed at 9/14/2024 0900  Gross per 24 hour   Intake 465 ml   Output --   Net 465 ml       Admission Weight  Weight: 73 kg (161 lb) (09/11/24 1258)    Daily Weight  09/11/24 : 64.5 kg (142 lb 1.6 oz)    Image Results  Transthoracic Echo (TTE) Complete     [facility], [address] [place] [address]               Tel [phone] and Fax [phone]    TRANSTHORACIC ECHOCARDIOGRAM REPORT       Patient Name:      RADHAMES KEN     Reading Physician:    [id number] Eamon Kirby MD  Study Date:        9/13/2024            Ordering Provider:    [id number] MCNEAL  MRN/PID:           [id number]             Fellow:  Accession#:        [id number]         Nurse:  Date of Birth/Age: 11/11/1933 / 89      Sonographer:          Zuleyka Lopez RDCS                     years  Gender:            M                    Additional Staff:  Height:            180.34 cm            Admit Date:           9/11/2024  Weight:            64.41 kg             Admission Status:     Inpatient -                                                                Routine  BSA / BMI:         1.82 m2 / 19.81      Encounter#:           [id number]                     kg/m2  Blood Pressure:    120/71 mmHg          Department Location:  [facility]                                                                 Floor    Study Type:    TRANSTHORACIC ECHO (TTE) COMPLETE  Diagnosis/ICD: Other pulmonary embolism without acute cor pulmonale-I26.99  Indication:    small PE  CPT Code:      Echo Complete w Full Doppler-22761    Patient History:  Pertinent History: PE, HTN, HLD, CHF, CAD, SOB, AS s/p TAVR 26 singh 3 crystal                     3/20/23.    Study Detail: The following Echo studies were performed: 2D, M-Mode, Doppler and                color flow. Technically challenging study due to prominent lung                artifact.       PHYSICIAN INTERPRETATION:  Left Ventricle: The left ventricular systolic function is normal, with a visually estimated ejection fraction of 55-60%. There are no regional left ventricular wall motion abnormalities. The left ventricular cavity size is normal. There is mild left ventricular hypertrophy involving the septal wall. Spectral Doppler shows an impaired relaxation pattern of left ventricular diastolic filling.  Left Atrium: The left atrium is mildly dilated.  Right Ventricle: The right ventricle is normal in size. There is normal right ventricular global systolic function.  Right Atrium: The right atrium is normal in size.  Aortic Valve: There is a prosthetic aortic valve present. The aortic valve dimensionless index is 0.59. There is transcatheter aortic valve replacement. Echo findings are consistent with normal aortic valve prosthesis structure and function. There is no evidence of aortic valve regurgitation. The peak instantaneous gradient of the aortic valve is 21.5 mmHg. The mean gradient of the aortic valve is 13.0 mmHg.  Mitral Valve: The mitral valve is mildly thickened. There is moderate mitral valve regurgitation which is anteriorly directed. There is flow reversal in the right pulmonary vein.  Tricuspid Valve: The tricuspid valve is structurally normal. There is mild tricuspid regurgitation.  Pulmonic Valve: The pulmonic valve is structurally normal. There is physiologic  pulmonic valve regurgitation.  Pericardium: There is no pericardial effusion noted.  Aorta: The aortic root is normal.  Pulmonary Artery: The tricuspid regurgitant velocity is 2.45 m/s, and with an estimated right atrial pressure of 3 mmHg, the estimated pulmonary artery pressure is borderline elevated with the RVSP at 27.0 mmHg.  Systemic Veins: The inferior vena cava appears to be of normal size, IVC inspiratory collapse greater than 50%.       CONCLUSIONS:   1. The left ventricular systolic function is normal, with a visually estimated ejection fraction of 55-60%.   2. Spectral Doppler shows an impaired relaxation pattern of left ventricular diastolic filling.   3. There is normal right ventricular global systolic function.   4. The left atrium is mildly dilated.   5. Moderate mitral valve regurgitation.   6. There is transcatheter aortic valve replacement.   7. Echo findings are consistent with normal aortic valve prosthesis structure and function.    QUANTITATIVE DATA SUMMARY:     2D MEASUREMENTS:          Normal Ranges:  Ao Root d:       2.50 cm  (2.0-3.7cm)  LVEDV Index:     73 ml/m2       LV SYSTOLIC FUNCTION BY 2D PLANIMETRY (MOD):                       Normal Ranges:  EF-A4C View:    46 % (>=55%)  EF-A2C View:    51 %  EF-Biplane:     49 %  EF-Visual:      58 %  LV EF Reported: 58 %       LV DIASTOLIC FUNCTION:             Normal Ranges:  MV Peak E:             1.01 m/s    (0.7-1.2 m/s)  MV Peak A:             1.41 m/s    (0.42-0.7 m/s)  E/A Ratio:             0.72        (1.0-2.2)  MV e'                  0.095 m/s   (>8.0)  MV lateral e'          0.10 m/s  MV medial e'           0.09 m/s  MV A Dur:              127.00 msec  E/e' Ratio:            10.61       (<8.0)  MV DT:                 174 msec    (150-240 msec)       MITRAL VALVE:          Normal Ranges:  MV DT:        174 msec (150-240msec)       MITRAL INSUFFICIENCY:             Normal Ranges:  PISA Radius:          0.5 cm  MR VTI:                98.80 cm  MR Vmax:              339.00 cm/s       AORTIC VALVE:                      Normal Ranges:  AoV Vmax:                2.32 m/s  (<=1.7m/s)  AoV Peak P.5 mmHg (<20mmHg)  AoV Mean P.0 mmHg (1.7-11.5mmHg)  LVOT Max Josep:            1.26 m/s  (<=1.1m/s)  AoV VTI:                 46.20 cm  (18-25cm)  LVOT VTI:                27.10 cm  LVOT Diameter:           2.30 cm   (1.8-2.4cm)  AoV Area, VTI:           2.44 cm2  (2.5-5.5cm2)  AoV Area,Vmax:           2.26 cm2  (2.5-4.5cm2)  AoV Dimensionless Index: 0.59       RIGHT VENTRICLE:  TAPSE: 25.0 mm  RV s'  0.14 m/s       TRICUSPID VALVE/RVSP:          Normal Ranges:  Peak TR Velocity:     2.45 m/s  RV Syst Pressure:     27 mmHg  (< 30mmHg)  IVC Diam:             1.82 cm       [id number] Eamon Kirby MD  Electronically signed on 2024 at 6:58:40 PM       ** Final **      Physical Exam  Constitutional:       Appearance: Normal appearance.   HENT:      Head: Normocephalic.      Mouth/Throat:      Mouth: Mucous membranes are moist.   Eyes:      Extraocular Movements: Extraocular movements intact.   Cardiovascular:      Rate and Rhythm: Normal rate and regular rhythm.      Pulses: Normal pulses.      Heart sounds: Normal heart sounds.   Pulmonary:      Effort: Pulmonary effort is normal.      Breath sounds: Normal breath sounds.   Abdominal:      General: Bowel sounds are normal.      Palpations: Abdomen is soft.   Musculoskeletal:         General: Normal range of motion.      Cervical back: Normal range of motion and neck supple.   Skin:     General: Skin is warm and dry.      Capillary Refill: Capillary refill takes less than 2 seconds.   Neurological:      General: No focal deficit present.      Mental Status: He is alert and oriented to person, place, and time.   Psychiatric:         Mood and Affect: Mood normal.         Behavior: Behavior normal.         Relevant Results               Assessment/Plan      Assessment &  Plan  Shortness of breath    Bronchitis    Acute renal failure (ARF) (CMS-[facility])    Chronic combined systolic and diastolic heart failure (Multi)    S/P TAVR (transcatheter aortic valve replacement)    HTN (hypertension)    Coronary artery disease involving native coronary artery of native heart without angina pectoris    Dyspnea, unspecified type    Acute pulmonary embolism (Multi)    Lactic acidosis    Thrombocytopenia (CMS-[facility])    #Bronchitis (improving)   #Acute PE   #Lactic acidosis (resolved)   -Increasing SOB with coughing fits at home   -No hx of asthma/COPD; recent workup with pulm and PFTs WNL  -WBC 8.2 > 5.0 > 9.7   -Covid/flu/RSV neg   -Lactate 2.1 > 2.2 > 1.4   -Procal 0.04   -CXR: 1. No acute pulmonary pathology. 2. Stable 1 cm density in the mid left lung. This may represent a granuloma.  -CT Chest: 1. No acute traumatic injury to the chest. 2.  There is some mild bronchial opacification in the lower lobes bilaterally suggestive underlying bronchitis and/or mucous plugging.  -D-dimer 5648  -CT PE: Small pulmonary embolus distal right upper lobar branch as described   -Start apixaban 10 mg BID x 7 days then 5 mg BID   -Echo pending to rule out right heart strain  -IV azithro, ceftriaxone (Day 4)  -Solumedrol 40 mg IV q12h to prednisone   -Mucinex BID  -Bronchodilators TID  -Tessalon Perles TID PRN for cough   -Stable on RA, continue to monitor SpO2   -Cardiology consulted for acute PE, appreciate recs   - apixaban dosing for PE     #Acute renal failure, resolved   -Bun/creat 20/1.38 >> 29/1.21 today 30/1.2  -Received 500ml bolus in ED  -Given CHF history, will hold off additional fluids and monitor renal function  -Renally dose medications as able  -Daily BMP     #Thrombocytopenia (improving)  - Plts 122 > 136>141  - Monitor for active bleeding on apixaban   - Daily CBC      #Chronic combined systolic and diastolic heart failure   #S/P TAVR (3/24)  #Essential HTN  #CAD  #HLD  -Echo (5/24): The left  ventricle is normal in size. There is mild left ventricular hypertrophy. Left ventricular systolic function is normal. EF = 53 ± 5% (2D biplane)   -Repeat echo pending in setting of acute PE - nl right ventricular systolic function, mod MR, TAVR replacement  -Trop 20 > 18  -, appears euvolemic on exam   -Continue ASA, atorvastatin, eplerenone, furosemide, metoprolol succinate   -2g Na diet      DVT PPx: Apixaban   GI PPx: Protonix   Diet: 2g Na   Code Status: Full     Total accumulated time spent face to face and not face to face preparing to see the patient, obtaining and reviewing separately obtained history; performing a medically appropriate examination and/or evaluation; counseling and educating the patient, family; ordering medications, tests, or procedures; referring and communicating with other health care professionals; documenting clinical information in the patient's medical record; independently interpreting results and communicating the results to the patient, family; and care coordination was 45 minutes            VELMA Carlin-CNP

## 2024-09-15 VITALS
DIASTOLIC BLOOD PRESSURE: 71 MMHG | HEIGHT: 71 IN | TEMPERATURE: 98.8 F | SYSTOLIC BLOOD PRESSURE: 131 MMHG | HEART RATE: 86 BPM | RESPIRATION RATE: 18 BRPM | WEIGHT: 142.1 LBS | BODY MASS INDEX: 19.9 KG/M2 | OXYGEN SATURATION: 95 %

## 2024-09-15 LAB
ANION GAP SERPL CALC-SCNC: 12 MMOL/L (ref 10–20)
ATRIAL RATE: 91 BPM
BUN SERPL-MCNC: 26 MG/DL (ref 6–23)
CALCIUM SERPL-MCNC: 8.3 MG/DL (ref 8.6–10.3)
CHLORIDE SERPL-SCNC: 101 MMOL/L (ref 98–107)
CO2 SERPL-SCNC: 23 MMOL/L (ref 21–32)
CREAT SERPL-MCNC: 1.1 MG/DL (ref 0.5–1.3)
EGFRCR SERPLBLD CKD-EPI 2021: 64 ML/MIN/1.73M*2
ERYTHROCYTE [DISTWIDTH] IN BLOOD BY AUTOMATED COUNT: 13.2 % (ref 11.5–14.5)
GLUCOSE SERPL-MCNC: 123 MG/DL (ref 74–99)
HCT VFR BLD AUTO: 39.7 % (ref 41–52)
HGB BLD-MCNC: 13.1 G/DL (ref 13.5–17.5)
MCH RBC QN AUTO: 29.8 PG (ref 26–34)
MCHC RBC AUTO-ENTMCNC: 33 G/DL (ref 32–36)
MCV RBC AUTO: 90 FL (ref 80–100)
NRBC BLD-RTO: 0 /100 WBCS (ref 0–0)
P AXIS: 41 DEGREES
P OFFSET: 176 MS
P ONSET: 116 MS
PLATELET # BLD AUTO: 126 X10*3/UL (ref 150–450)
POTASSIUM SERPL-SCNC: 4.1 MMOL/L (ref 3.5–5.3)
PR INTERVAL: 180 MS
Q ONSET: 206 MS
QRS COUNT: 15 BEATS
QRS DURATION: 126 MS
QT INTERVAL: 354 MS
QTC CALCULATION(BAZETT): 435 MS
QTC FREDERICIA: 407 MS
R AXIS: -65 DEGREES
RBC # BLD AUTO: 4.39 X10*6/UL (ref 4.5–5.9)
SODIUM SERPL-SCNC: 132 MMOL/L (ref 136–145)
T AXIS: 79 DEGREES
T OFFSET: 383 MS
VENTRICULAR RATE: 91 BPM
WBC # BLD AUTO: 6.5 X10*3/UL (ref 4.4–11.3)

## 2024-09-15 PROCEDURE — 2500000004 HC RX 250 GENERAL PHARMACY W/ HCPCS (ALT 636 FOR OP/ED): Mod: IPSPLIT | Performed by: NURSE PRACTITIONER

## 2024-09-15 PROCEDURE — 80048 BASIC METABOLIC PNL TOTAL CA: CPT | Mod: IPSPLIT | Performed by: NURSE PRACTITIONER

## 2024-09-15 PROCEDURE — 85027 COMPLETE CBC AUTOMATED: CPT | Mod: IPSPLIT | Performed by: NURSE PRACTITIONER

## 2024-09-15 PROCEDURE — 1100000001 HC PRIVATE ROOM DAILY: Mod: IPSPLIT

## 2024-09-15 PROCEDURE — 36415 COLL VENOUS BLD VENIPUNCTURE: CPT | Mod: IPSPLIT | Performed by: NURSE PRACTITIONER

## 2024-09-15 PROCEDURE — 94760 N-INVAS EAR/PLS OXIMETRY 1: CPT | Mod: IPSPLIT

## 2024-09-15 PROCEDURE — 2500000001 HC RX 250 WO HCPCS SELF ADMINISTERED DRUGS (ALT 637 FOR MEDICARE OP): Mod: IPSPLIT | Performed by: NURSE PRACTITIONER

## 2024-09-15 PROCEDURE — 2500000002 HC RX 250 W HCPCS SELF ADMINISTERED DRUGS (ALT 637 FOR MEDICARE OP, ALT 636 FOR OP/ED): Mod: IPSPLIT

## 2024-09-15 PROCEDURE — 2500000001 HC RX 250 WO HCPCS SELF ADMINISTERED DRUGS (ALT 637 FOR MEDICARE OP): Mod: IPSPLIT

## 2024-09-15 PROCEDURE — 94640 AIRWAY INHALATION TREATMENT: CPT | Mod: IPSPLIT

## 2024-09-15 PROCEDURE — 99233 SBSQ HOSP IP/OBS HIGH 50: CPT

## 2024-09-15 RX ADMIN — PREDNISONE 40 MG: 20 TABLET ORAL at 08:42

## 2024-09-15 RX ADMIN — FUROSEMIDE 40 MG: 40 TABLET ORAL at 06:41

## 2024-09-15 RX ADMIN — CEFUROXIME AXETIL 500 MG: 250 TABLET, FILM COATED ORAL at 21:28

## 2024-09-15 RX ADMIN — CEFUROXIME AXETIL 500 MG: 250 TABLET, FILM COATED ORAL at 08:42

## 2024-09-15 RX ADMIN — EPLERENONE 25 MG: 25 TABLET, FILM COATED ORAL at 08:42

## 2024-09-15 RX ADMIN — APIXABAN 10 MG: 5 TABLET, FILM COATED ORAL at 21:28

## 2024-09-15 RX ADMIN — IPRATROPIUM BROMIDE AND ALBUTEROL SULFATE 3 ML: 2.5; .5 SOLUTION RESPIRATORY (INHALATION) at 08:48

## 2024-09-15 RX ADMIN — ATORVASTATIN CALCIUM 40 MG: 40 TABLET, FILM COATED ORAL at 08:42

## 2024-09-15 RX ADMIN — IPRATROPIUM BROMIDE AND ALBUTEROL SULFATE 3 ML: 2.5; .5 SOLUTION RESPIRATORY (INHALATION) at 22:05

## 2024-09-15 RX ADMIN — ASPIRIN 81 MG CHEWABLE TABLET 81 MG: 81 TABLET CHEWABLE at 08:42

## 2024-09-15 RX ADMIN — APIXABAN 10 MG: 5 TABLET, FILM COATED ORAL at 08:41

## 2024-09-15 RX ADMIN — GUAIFENESIN 600 MG: 600 TABLET, EXTENDED RELEASE ORAL at 08:42

## 2024-09-15 RX ADMIN — GUAIFENESIN 600 MG: 600 TABLET, EXTENDED RELEASE ORAL at 21:28

## 2024-09-15 RX ADMIN — PANTOPRAZOLE SODIUM 40 MG: 40 TABLET, DELAYED RELEASE ORAL at 06:48

## 2024-09-15 RX ADMIN — IPRATROPIUM BROMIDE AND ALBUTEROL SULFATE 3 ML: 2.5; .5 SOLUTION RESPIRATORY (INHALATION) at 15:43

## 2024-09-15 RX ADMIN — METOPROLOL SUCCINATE 12.5 MG: 25 TABLET, EXTENDED RELEASE ORAL at 06:53

## 2024-09-15 ASSESSMENT — COGNITIVE AND FUNCTIONAL STATUS - GENERAL
DAILY ACTIVITIY SCORE: 24
MOBILITY SCORE: 23
CLIMB 3 TO 5 STEPS WITH RAILING: A LITTLE

## 2024-09-15 ASSESSMENT — PAIN - FUNCTIONAL ASSESSMENT: PAIN_FUNCTIONAL_ASSESSMENT: 0-10

## 2024-09-15 ASSESSMENT — PAIN SCALES - GENERAL
PAINLEVEL_OUTOF10: 0 - NO PAIN
PAINLEVEL_OUTOF10: 0 - NO PAIN

## 2024-09-15 NOTE — CARE PLAN
The patient's goals for the shift include      The clinical goals for the shift include Pt will have no falls or injuries by the end of the shift    Over the shift, the patient did make progress toward the following goals. Patient remained free from any falls or injuries and rested throughout the night  Problem: Pain - Adult  Goal: Verbalizes/displays adequate comfort level or baseline comfort level  Outcome: Progressing

## 2024-09-15 NOTE — PROGRESS NOTES
"Radhames Ken is a 89 y.o. male on day 3 of admission presenting with Shortness of breath.    Subjective     No overnight events reported.     Patient on room air this morning, wife at bedside. He reports a dry cough overnight and says he feels himself wheezing. No chest pain reported.        Objective     Physical Exam  Constitutional:       General: He is not in acute distress.     Appearance: He is not toxic-appearing.   HENT:      Head: Normocephalic and atraumatic.      Mouth/Throat:      Mouth: Mucous membranes are moist.   Eyes:      Conjunctiva/sclera: Conjunctivae normal.   Cardiovascular:      Rate and Rhythm: Normal rate and regular rhythm.   Pulmonary:      Effort: No respiratory distress.      Breath sounds: No wheezing.      Comments: Diminished breath sounds, on room air   Abdominal:      General: There is no distension.      Palpations: Abdomen is soft.      Tenderness: There is no abdominal tenderness.   Musculoskeletal:      Right lower leg: No edema.      Left lower leg: No edema.   Skin:     General: Skin is warm and dry.   Neurological:      Mental Status: He is alert and oriented to person, place, and time.   Psychiatric:         Mood and Affect: Mood normal.         Behavior: Behavior normal.         Last Recorded Vitals  Blood pressure 154/82, pulse 86, temperature 36.5 °C (97.7 °F), temperature source Temporal, resp. rate 16, height 1.803 m (5' 11\"), weight 64.5 kg (142 lb 1.6 oz), SpO2 94%.  Intake/Output last 3 Shifts:  I/O last 3 completed shifts:  In: 825 (12.8 mL/kg) [P.O.:780; I.V.:45 (0.7 mL/kg)]  Out: - (0 mL/kg)   Weight: 64.5 kg     Relevant Results          Scheduled medications  apixaban, 10 mg, oral, BID   Followed by  [START ON 9/20/2024] apixaban, 5 mg, oral, BID  aspirin, 81 mg, oral, Daily  atorvastatin, 40 mg, oral, Daily  cefuroxime, 500 mg, oral, BID  eplerenone, 25 mg, oral, Daily  furosemide, 40 mg, oral, Daily  guaiFENesin, 600 mg, oral, BID  ipratropium-albuteroL, " 3 mL, nebulization, TID  metoprolol succinate XL, 12.5 mg, oral, Daily  pantoprazole, 40 mg, oral, Daily before breakfast  predniSONE, 40 mg, oral, Daily      Continuous medications  sodium chloride 0.9%, 10 mL/hr, Last Rate: 10 mL/hr (09/14/24 0549)      PRN medications  PRN medications: acetaminophen, acetaminophen, albuterol, benzocaine-menthol, benzonatate, ondansetron, polyethylene glycol, sodium chloride 0.9%    Results for orders placed or performed during the hospital encounter of 09/11/24 (from the past 24 hour(s))   Basic Metabolic Panel   Result Value Ref Range    Glucose 123 (H) 74 - 99 mg/dL    Sodium 132 (L) 136 - 145 mmol/L    Potassium 4.1 3.5 - 5.3 mmol/L    Chloride 101 98 - 107 mmol/L    Bicarbonate 23 21 - 32 mmol/L    Anion Gap 12 10 - 20 mmol/L    Urea Nitrogen 26 (H) 6 - 23 mg/dL    Creatinine 1.10 0.50 - 1.30 mg/dL    eGFR 64 >60 mL/min/1.73m*2    Calcium 8.3 (L) 8.6 - 10.3 mg/dL   CBC   Result Value Ref Range    WBC 6.5 4.4 - 11.3 x10*3/uL    nRBC 0.0 0.0 - 0.0 /100 WBCs    RBC 4.39 (L) 4.50 - 5.90 x10*6/uL    Hemoglobin 13.1 (L) 13.5 - 17.5 g/dL    Hematocrit 39.7 (L) 41.0 - 52.0 %    MCV 90 80 - 100 fL    MCH 29.8 26.0 - 34.0 pg    MCHC 33.0 32.0 - 36.0 g/dL    RDW 13.2 11.5 - 14.5 %    Platelets 126 (L) 150 - 450 x10*3/uL       ECG 12 lead    Result Date: 9/15/2024  Sinus rhythm with occasional Premature ventricular complexes Left axis deviation Right bundle branch block Inferior infarct (cited on or before 22-NOV-2023) Abnormal ECG When compared with ECG of 22-NOV-2023 10:20, Right bundle branch block is now Present See ED provider note for full interpretation and clinical correlation Confirmed by Mili Cardona ([id number]) on 9/15/2024 12:55:35 PM    Transthoracic Echo (TTE) Complete    Result Date: 9/13/2024   [facility], [address] [address]              Tel [phone] and Fax [phone] TRANSTHORACIC ECHOCARDIOGRAM REPORT  Patient Name:      MELISSA  JUAN ZAMUDIO     Reading Physician:    [id number] Eamon Kirby MD Study Date:        9/13/2024            Ordering Provider:    [id number] NOEMY MCNEAL MRN/PID:           [id number]             Fellow: Accession#:        [id number]         Nurse: Date of Birth/Age: 11/11/1933 / 89      Sonographer:          Zuleyka Lopez RDCS                    years Gender:            M                    Additional Staff: Height:            180.34 cm            Admit Date:           9/11/2024 Weight:            64.41 kg             Admission Status:     Inpatient -                                                               Routine BSA / BMI:         1.82 m2 / 19.81      Encounter#:           [id number]                    kg/m2 Blood Pressure:    120/71 mmHg          Department Location:  [facility] Study Type:    TRANSTHORACIC ECHO (TTE) COMPLETE Diagnosis/ICD: Other pulmonary embolism without acute cor pulmonale-I26.99 Indication:    small PE CPT Code:      Echo Complete w Full Doppler-19243 Patient History: Pertinent History: PE, HTN, HLD, CHF, CAD, SOB, AS s/p TAVR 26 singh 3 crystal                    3/20/23. Study Detail: The following Echo studies were performed: 2D, M-Mode, Doppler and               color flow. Technically challenging study due to prominent lung               artifact.  PHYSICIAN INTERPRETATION: Left Ventricle: The left ventricular systolic function is normal, with a visually estimated ejection fraction of 55-60%. There are no regional left ventricular wall motion abnormalities. The left ventricular cavity size is normal. There is mild left ventricular hypertrophy involving the septal wall. Spectral Doppler shows an impaired relaxation pattern of left ventricular diastolic filling. Left Atrium: The left atrium  is mildly dilated. Right Ventricle: The right ventricle is normal in size. There is normal right ventricular global systolic function. Right Atrium: The right atrium is normal in size. Aortic Valve: There is a prosthetic aortic valve present. The aortic valve dimensionless index is 0.59. There is transcatheter aortic valve replacement. Echo findings are consistent with normal aortic valve prosthesis structure and function. There is no evidence of aortic valve regurgitation. The peak instantaneous gradient of the aortic valve is 21.5 mmHg. The mean gradient of the aortic valve is 13.0 mmHg. Mitral Valve: The mitral valve is mildly thickened. There is moderate mitral valve regurgitation which is anteriorly directed. There is flow reversal in the right pulmonary vein. Tricuspid Valve: The tricuspid valve is structurally normal. There is mild tricuspid regurgitation. Pulmonic Valve: The pulmonic valve is structurally normal. There is physiologic pulmonic valve regurgitation. Pericardium: There is no pericardial effusion noted. Aorta: The aortic root is normal. Pulmonary Artery: The tricuspid regurgitant velocity is 2.45 m/s, and with an estimated right atrial pressure of 3 mmHg, the estimated pulmonary artery pressure is borderline elevated with the RVSP at 27.0 mmHg. Systemic Veins: The inferior vena cava appears to be of normal size, IVC inspiratory collapse greater than 50%.  CONCLUSIONS:  1. The left ventricular systolic function is normal, with a visually estimated ejection fraction of 55-60%.  2. Spectral Doppler shows an impaired relaxation pattern of left ventricular diastolic filling.  3. There is normal right ventricular global systolic function.  4. The left atrium is mildly dilated.  5. Moderate mitral valve regurgitation.  6. There is transcatheter aortic valve replacement.  7. Echo findings are consistent with normal aortic valve prosthesis structure and function. QUANTITATIVE DATA SUMMARY:  2D  MEASUREMENTS:          Normal Ranges: Ao Root d:       2.50 cm  (2.0-3.7cm) LVEDV Index:     73 ml/m2  LV SYSTOLIC FUNCTION BY 2D PLANIMETRY (MOD):                      Normal Ranges: EF-A4C View:    46 % (>=55%) EF-A2C View:    51 % EF-Biplane:     49 % EF-Visual:      58 % LV EF Reported: 58 %  LV DIASTOLIC FUNCTION:             Normal Ranges: MV Peak E:             1.01 m/s    (0.7-1.2 m/s) MV Peak A:             1.41 m/s    (0.42-0.7 m/s) E/A Ratio:             0.72        (1.0-2.2) MV e'                  0.095 m/s   (>8.0) MV lateral e'          0.10 m/s MV medial e'           0.09 m/s MV A Dur:              127.00 msec E/e' Ratio:            10.61       (<8.0) MV DT:                 174 msec    (150-240 msec)  MITRAL VALVE:          Normal Ranges: MV DT:        174 msec (150-240msec)  MITRAL INSUFFICIENCY:             Normal Ranges: PISA Radius:          0.5 cm MR VTI:               98.80 cm MR Vmax:              339.00 cm/s  AORTIC VALVE:                      Normal Ranges: AoV Vmax:                2.32 m/s  (<=1.7m/s) AoV Peak P.5 mmHg (<20mmHg) AoV Mean P.0 mmHg (1.7-11.5mmHg) LVOT Max Josep:            1.26 m/s  (<=1.1m/s) AoV VTI:                 46.20 cm  (18-25cm) LVOT VTI:                27.10 cm LVOT Diameter:           2.30 cm   (1.8-2.4cm) AoV Area, VTI:           2.44 cm2  (2.5-5.5cm2) AoV Area,Vmax:           2.26 cm2  (2.5-4.5cm2) AoV Dimensionless Index: 0.59  RIGHT VENTRICLE: TAPSE: 25.0 mm RV s'  0.14 m/s  TRICUSPID VALVE/RVSP:          Normal Ranges: Peak TR Velocity:     2.45 m/s RV Syst Pressure:     27 mmHg  (< 30mmHg) IVC Diam:             1.82 cm  [id number] Eamon Kirby MD Electronically signed on 2024 at 6:58:40 PM  ** Final **     CT angio chest for pulmonary embolism    Result Date: 2024  Interpreted By:  Carlos Feldman, STUDY: CT ANGIO CHEST FOR PULMONARY EMBOLISM;  2024 11:44 am   INDICATION: 89 y/o   M with   Signs/Symptoms:elevated d-dimer.   COMPARISON: None available.   ACCESSION NUMBER(S): [id number]   ORDERING CLINICIAN: CAMPBELL LUNSFORD   TECHNIQUE: CT images of the chest obtained following the administration of  75 ml of IV contrast (Omnipaque 350) in the pulmonary arterial phase of contrast. Axial and coronal MIP images, as well as coronal sagittal reformatted images generated and reviewed.   FINDINGS: LIMITATIONS/LINES:   None.   PULMONARY ARTERIES:   Small filling defect at distal right upper lobar artery with extension into origin of segmental branches. No other filling defects identified.   HEART:   Normal-size. Endovascular aortic valve prosthesis. No pericardial effusion.   MEDIASTINUM/LUIS MIGUEL:   Unremarkable.   PLEURA:   Segmental branches   LUNG PARENCHYMA:   Unremarkable.   BONES:   Unremarkable.   CHEST WALL/OTHER:   Unremarkable.       Small pulmonary embolus distal right upper lobar branch as described   MACRO: None   Signed by: Carlos Feldman 9/12/2024 12:07 PM Dictation workstation:   [id number]    CT chest wo IV contrast    Result Date: 9/11/2024  STUDY: CT Chest without IV Contrast; 09/11/2024 6:43 pm INDICATION: Cough.  Shortness of breath. COMPARISON: XR Chest 09/11/2024;  CT Chest 12/22/2023 and CTA Chest 11/22/2023. ACCESSION NUMBER(S): [id number] ORDERING CLINICIAN: ANGEL JEFFRIES TECHNIQUE:  CT of the chest was performed without contrast.  Automated mA/kV exposure control was utilized and patient examination was performed in strict accordance with principles of ALARA. FINDINGS: MEDIASTINUM: The heart is normal in size without pericardial effusion.  Coronary artery calcifications present.  Aortic valve replacement noted.  LUNGS/PLEURA: There is no pleural effusion, pleural thickening, or pneumothorax. The airways are patent. There some mild linear reticular nodular infiltrate right apex anteriorly could represent scarring similar prior exam.  Subpleural calcification anterior lateral left  lung noted likely prior trauma or infection with some scarring similar prior study.  There is some mild bronchial opacification in the subsegmental branches in the lower lobes bilaterally suggestive underlying bronchitis and/or mucous plugging.  This finding is new since prior exam.  The other findings are stable. LYMPH NODES: Thoracic lymph nodes are not enlarged. UPPER ABDOMEN: Pneumobilia.  Scattered hypodensities throughout the left hepatic lobe 1.5 cm in size not significantly changed probably cysts. BONES: There are no acute fractures.  No suspicious bony lesions.       1. No acute traumatic injury to the chest. 2.  There is some mild bronchial opacification in the lower lobes bilaterally suggestive underlying bronchitis and/or mucous plugging. This finding is new since prior exam. Signed by Gordon Blackmon MD    XR chest 2 views    Result Date: 9/11/2024  STUDY: Chest Radiographs;  [INSERT month/day/year xx/x/xxxx and Time x:xx using AM or PM)] INDICATION: Coughing and wheezing. COMPARISON: None Available ACCESSION NUMBER(S): [id number] ORDERING CLINICIAN: LEANNA ANDERSON TECHNIQUE:  Frontal and lateral chest. FINDINGS: CARDIOMEDIASTINAL SILHOUETTE: Cardiomediastinal silhouette is normal in size and configuration.  LUNGS: Lungs are clear. There is a stable 1 cm density in the mid left lung.  ABDOMEN: No remarkable upper abdominal findings.  BONES: No acute osseous changes.    1. No acute pulmonary pathology. 2. Stable 1 cm density in the mid left lung. This may represent a granuloma. Signed by Chris Sims MD            Assessment/Plan   Assessment & Plan  Shortness of breath    Bronchitis    Acute renal failure (ARF) (CMS-HCC)    Chronic combined systolic and diastolic heart failure (Multi)    S/P TAVR (transcatheter aortic valve replacement)    HTN (hypertension)    Coronary artery disease involving native coronary artery of native heart without angina pectoris    Dyspnea, unspecified type    Acute  pulmonary embolism (Multi)    Lactic acidosis    Thrombocytopenia (CMS-HCC)    #Bronchitis (improving)   #Acute PE   #Lactic acidosis (resolved)   -Increasing SOB with coughing fits at home   -No hx of asthma/COPD; recent workup with pulm and PFTs WNL  -WBC 8.2 > 5.0 > 9.7 > 8.7 > 6.5   -Covid/flu/RSV neg   -Lactate 2.1 > 2.2 > 1.4   -Procal 0.04   -CXR: 1. No acute pulmonary pathology. 2. Stable 1 cm density in the mid left lung. This may represent a granuloma.  -CT Chest: 1. No acute traumatic injury to the chest. 2.  There is some mild bronchial opacification in the lower lobes bilaterally suggestive underlying bronchitis and/or mucous plugging.  -D-dimer 5648  -CT PE: Small pulmonary embolus distal right upper lobar branch as described   -Continue apixaban 10 mg BID x 7 days then 5 mg BID   -Echo with LVEF 65-70%, no right heart strain  -Completed 3 days of azithromycin, now on cefuroxime 500 mg BID   -Solumedrol transitioned to prednisone 40 mg every day x 5 days   -Mucinex BID  -Bronchodilators TID  -Tessalon Perles TID PRN for cough   -Stable on RA, continue to monitor SpO2   -Cardiology consulted for acute PE     #Acute renal failure, resolved   -Bun/creat 20/1.38 >> 26/1.10 today   -Received 500ml bolus in ED  -Given CHF history, will hold off additional fluids and monitor renal function  -Renally dose medications as able  -Daily BMP     #Thrombocytopenia  - Plts 141 > 126   - Monitor for active bleeding on apixaban   - Daily CBC      #Chronic combined systolic and diastolic heart failure   #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 There is mild left ventricular hypertrophy. Left ventricular systolic function is normal. EF = 53 ± 5% (2D biplane)   -Repeat echo as above   -Trop 20 > 18  -, appears euvolemic on exam   -Continue ASA, atorvastatin, eplerenone, furosemide, metoprolol succinate   -2g Na diet      DVT PPx: Apixaban   GI PPx: Protonix    Diet: 2g Na   Code Status: Full      Disposition: Patient requires inpatient management at this time.         Melissa Dominguez PA-C

## 2024-09-15 NOTE — ED PROVIDER NOTES
HPI   Chief Complaint   Patient presents with    Cough     Pt's family states that pt was sent to ED by PCP for coughing the PCP stated that he need a work up, pt vomiting yesterday and they are concerned that he may have aspirated verses CHF or a pneumonia. Pt had an aortic valve a few months ago.         History of present illness:  89-year-old male presents emergency room for complaints of persistent cough.  The patient is companied by his family provides a primary history.  They state the patient began having a dry nonproductive cough couple weeks ago and they state over the past week he has become increasingly productive with clear sputum and now yellow sputum.  They state that they went to the primary care doctor and he was evaluated and they explained to the family that they had concerns that the patient might be suffering from an aspiration pneumonia.  They requested the patient be brought here to the emergency room this time for further care and evaluation as they are unsure if he is suffering from congestive heart failure exacerbation versus possible aspiration pneumonia.  At this time the patient states he just feels tired and states he does get short of breath with exertion which is uncommon for him but denies any chest pain or any other symptoms at this time.  He has a past medical history of congestive heart failure, sinusitis, hypertension hyperlipidemia and a history of seizure.    Social history: Negative for alcohol and drug use.    Review of systems:   Gen.: No weight loss, fatigue, anorexia, insomnia, fever.   Eyes: No vision loss, double vision, drainage, eye pain.   ENT: No pharyngitis, dry mouth.   Cardiac: No chest pain, palpitations, syncope, near syncope.   Pulmonary: No hemoptysis.   Heme/lymph: No swollen glands, fever, bleeding.   GI: No abdominal pain, change in bowel habits, melena, hematemesis, hematochezia, nausea, vomiting, diarrhea.   : No discharge, dysuria, frequency, urgency,  hematuria.   Musculoskeletal: No limb pain, joint pain, joint swelling.   Skin: No rashes.   Review of systems is otherwise negative unless stated above or in history of present illness.      Physical exam:  General: Vitals noted, no distress. Afebrile.   EENT: No lymphadenopathy appreciated  Cardiac: Regular, rate, rhythm, no murmur.   Pulmonary: Lungs clear bilaterally with good aeration. No adventitious breath sounds.   Abdomen: Soft, nonsurgical. Nontender. No peritoneal signs. Normoactive bowel sounds.   Extremities: No peripheral edema.   Skin: No rash.   Neuro: No focal neurologic deficits      Medical decision making:   Testing: CBC CMP chest x-ray COVID testing BNP lactate: Testing shows BNP that is elevated over 200 COVID influenza RSV testing negative chest x-ray unremarkable CBC CMP unremarkable lactate was 2.1 and repeat lactate was 2.2 after 500 cc of IV fluids  Treatment: IV antibiotics started  Reevaluation:   Plan: 89-year-old male presents emergency room for complaints of persistent cough.  The patient is companied by his family provides a primary history.  They state the patient began having a dry nonproductive cough couple weeks ago and they state over the past week he has become increasingly productive with clear sputum and now yellow sputum.  They state that they went to the primary care doctor and he was evaluated and they explained to the family that they had concerns that the patient might be suffering from an aspiration pneumonia.  They requested the patient be brought here to the emergency room this time for further care and evaluation as they are unsure if he is suffering from congestive heart failure exacerbation versus possible aspiration pneumonia.  At this time the patient states he just feels tired and states he does get short of breath with exertion which is uncommon for him but denies any chest pain or any other symptoms at this time.  He has a past medical history of congestive  heart failure, sinusitis, hypertension hyperlipidemia and a history of seizure. EENT: No lymphadenopathy appreciated  Cardiac: Regular, rate, rhythm, no murmur. Pulmonary: Lungs clear bilaterally with good aeration. No adventitious breath sounds.  Vitals are appropriate this time and explained to the patient the testing.  He was evaluated by the attending physician we discussed the possibility of admission of the family who is in agreement this plan.  The patient is admitted to the hospitalist for further care and treatment.  Impression:   1.  Dyspnea  2.  Cough          History provided by:  Patient   used: No            Patient History   Past Medical History:   Diagnosis Date    CHF (congestive heart failure) (Multi)     Chronic sinusitis, unspecified 03/28/2017    Sinobronchitis    COVID-19 05/05/2022    COVID-19    COVID-19 05/05/2022    Pneumonia due to COVID-19 virus    Eczema     Elevated prostate specific antigen (PSA)     Rising PSA level    Encounter for screening for malignant neoplasm, site unspecified 07/24/2018    Cancer screening    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    Diarrhea, functional    Heart disease     HL (hearing loss)     Hypertension     Other disorders of plasma-protein metabolism, not elsewhere classified 05/30/2017    Serum albumin decreased    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    Personal history of diseases of the skin and subcutaneous tissue 02/18/2019    History of eczema    Personal history of other diseases of male genital organs     History of BPH    Personal history of other diseases of the circulatory system     History of hypertension    Personal history of other diseases of the circulatory system     History of mitral valve insufficiency    Personal history of other diseases of the musculoskeletal system and connective tissue     Personal history of arthritis    Personal history of other endocrine, nutritional and metabolic  disease     History of hyperlipidemia    Personal history of other specified conditions 11/09/2018    History of seizure    Personal history of other specified conditions 05/05/2022    History of vomiting    Stroke (Multi)     Visual impairment      Past Surgical History:   Procedure Laterality Date    CARDIAC SURGERY  04/28/2014    Heart Surgery    CHOLECYSTECTOMY  04/28/2014    Cholecystectomy    GALLBLADDER SURGERY  04/28/2014    Gallbladder Surgery    MR HEAD ANGIO WO IV CONTRAST  9/30/2018    MR HEAD ANGIO WO IV CONTRAST 9/30/2018 [facility] CLINICAL LEGAC[facility]    MR NECK ANGIO WO IV CONTRAST  9/30/2018    MR NECK ANGIO WO IV CONTRAST 9/30/2018 [facility] CLINICAL LEGACY    OTHER SURGICAL HISTORY  04/28/2014    Knee Repair    OTHER SURGICAL HISTORY  04/28/2014    Needle Biopsy Of Prostate    OTHER SURGICAL HISTORY  03/05/2021    Complete colonoscopy    OTHER SURGICAL HISTORY  03/05/2021    Endoscopy    OTHER SURGICAL HISTORY  10/08/2018    Common Bile Duct Stone Removal    PROSTATE SURGERY  10/08/2018    Prostate Surgery     Family History   Problem Relation Name Age of Onset    Heart disease Mother Abiola     Breast cancer Mother Abiola     Lung cancer Father      Cancer Brother Arpit     Cancer Brother Kevin     Cancer Brother Tere     Cancer Brother       Social History     Tobacco Use    Smoking status: Never    Smokeless tobacco: Never   Vaping Use    Vaping status: Never Used   Substance Use Topics    Alcohol use: Not Currently    Drug use: Never       Physical Exam   ED Triage Vitals   Temp Heart Rate Resp BP   09/11/24 1258 09/11/24 1258 09/11/24 1258 09/11/24 1258   36.4 °C (97.5 °F) 91 20 131/66      SpO2 Temp Source Heart Rate Source Patient Position   09/11/24 1258 09/11/24 1258 09/11/24 1258 09/11/24 1806   96 % Temporal Monitor Sitting      BP Location FiO2 (%)     09/11/24 1806 --     Left arm        Physical Exam      ED Course & MDM   Diagnoses as of 09/14/24 2022   Dyspnea, unspecified type   Persistent  cough     I agree with the workup evaluation and plan for this patient please see separate ED attending note.  CHILO Patino MD            No data recorded     García Coma Scale Score: 14 (09/13/24 2010 : Katina Khalil RN)                           Medical Decision Making      Procedure  Procedures     Juan Hernandez PA-C  09/14/24 2026       David Patino MD  09/15/24 2736

## 2024-09-15 NOTE — CARE PLAN
Problem: Heart Failure  Goal: Report improvement of dyspnea/breathlessness this shift  Outcome: Progressing     Vital signs stable. SpO2 remained > 94% room air. Spouse remained at bedside. Plan for discharge home on Eliquis.

## 2024-09-16 ENCOUNTER — PHARMACY VISIT (OUTPATIENT)
Dept: PHARMACY | Facility: CLINIC | Age: 89
End: 2024-09-16
Payer: COMMERCIAL

## 2024-09-16 VITALS
RESPIRATION RATE: 17 BRPM | DIASTOLIC BLOOD PRESSURE: 77 MMHG | HEIGHT: 71 IN | SYSTOLIC BLOOD PRESSURE: 138 MMHG | BODY MASS INDEX: 19.9 KG/M2 | OXYGEN SATURATION: 97 % | TEMPERATURE: 98.2 F | HEART RATE: 81 BPM | WEIGHT: 142.1 LBS

## 2024-09-16 LAB
ANION GAP SERPL CALC-SCNC: 10 MMOL/L (ref 10–20)
BUN SERPL-MCNC: 25 MG/DL (ref 6–23)
CALCIUM SERPL-MCNC: 8.2 MG/DL (ref 8.6–10.3)
CHLORIDE SERPL-SCNC: 101 MMOL/L (ref 98–107)
CO2 SERPL-SCNC: 26 MMOL/L (ref 21–32)
CREAT SERPL-MCNC: 1.15 MG/DL (ref 0.5–1.3)
EGFRCR SERPLBLD CKD-EPI 2021: 60 ML/MIN/1.73M*2
ERYTHROCYTE [DISTWIDTH] IN BLOOD BY AUTOMATED COUNT: 13.2 % (ref 11.5–14.5)
GLUCOSE SERPL-MCNC: 98 MG/DL (ref 74–99)
HCT VFR BLD AUTO: 38.9 % (ref 41–52)
HGB BLD-MCNC: 13 G/DL (ref 13.5–17.5)
MCH RBC QN AUTO: 29.8 PG (ref 26–34)
MCHC RBC AUTO-ENTMCNC: 33.4 G/DL (ref 32–36)
MCV RBC AUTO: 89 FL (ref 80–100)
NRBC BLD-RTO: 0 /100 WBCS (ref 0–0)
PLATELET # BLD AUTO: 115 X10*3/UL (ref 150–450)
POTASSIUM SERPL-SCNC: 3.9 MMOL/L (ref 3.5–5.3)
RBC # BLD AUTO: 4.36 X10*6/UL (ref 4.5–5.9)
SODIUM SERPL-SCNC: 133 MMOL/L (ref 136–145)
WBC # BLD AUTO: 5.5 X10*3/UL (ref 4.4–11.3)

## 2024-09-16 PROCEDURE — 99239 HOSP IP/OBS DSCHRG MGMT >30: CPT

## 2024-09-16 PROCEDURE — 85027 COMPLETE CBC AUTOMATED: CPT | Mod: IPSPLIT | Performed by: NURSE PRACTITIONER

## 2024-09-16 PROCEDURE — 36415 COLL VENOUS BLD VENIPUNCTURE: CPT | Mod: IPSPLIT | Performed by: NURSE PRACTITIONER

## 2024-09-16 PROCEDURE — 2500000002 HC RX 250 W HCPCS SELF ADMINISTERED DRUGS (ALT 637 FOR MEDICARE OP, ALT 636 FOR OP/ED): Mod: IPSPLIT

## 2024-09-16 PROCEDURE — 9420000001 HC RT PATIENT EDUCATION 5 MIN: Mod: IPSPLIT

## 2024-09-16 PROCEDURE — 2500000004 HC RX 250 GENERAL PHARMACY W/ HCPCS (ALT 636 FOR OP/ED): Mod: IPSPLIT | Performed by: NURSE PRACTITIONER

## 2024-09-16 PROCEDURE — RXMED WILLOW AMBULATORY MEDICATION CHARGE

## 2024-09-16 PROCEDURE — 2500000001 HC RX 250 WO HCPCS SELF ADMINISTERED DRUGS (ALT 637 FOR MEDICARE OP): Mod: IPSPLIT

## 2024-09-16 PROCEDURE — 80048 BASIC METABOLIC PNL TOTAL CA: CPT | Mod: IPSPLIT | Performed by: NURSE PRACTITIONER

## 2024-09-16 PROCEDURE — 94640 AIRWAY INHALATION TREATMENT: CPT | Mod: IPSPLIT

## 2024-09-16 PROCEDURE — 94760 N-INVAS EAR/PLS OXIMETRY 1: CPT | Mod: IPSPLIT

## 2024-09-16 PROCEDURE — 2500000001 HC RX 250 WO HCPCS SELF ADMINISTERED DRUGS (ALT 637 FOR MEDICARE OP): Mod: IPSPLIT | Performed by: NURSE PRACTITIONER

## 2024-09-16 RX ORDER — IPRATROPIUM BROMIDE AND ALBUTEROL SULFATE 2.5; .5 MG/3ML; MG/3ML
3 SOLUTION RESPIRATORY (INHALATION) 2 TIMES DAILY PRN
Start: 2024-09-16

## 2024-09-16 RX ORDER — PREDNISONE 20 MG/1
40 TABLET ORAL DAILY
Qty: 6 TABLET | Refills: 0 | Status: SHIPPED | OUTPATIENT
Start: 2024-09-16 | End: 2024-09-19

## 2024-09-16 RX ORDER — CEFUROXIME AXETIL 500 MG/1
500 TABLET ORAL 2 TIMES DAILY
Qty: 6 TABLET | Refills: 0 | Status: SHIPPED | OUTPATIENT
Start: 2024-09-16 | End: 2024-09-19

## 2024-09-16 RX ADMIN — ASPIRIN 81 MG CHEWABLE TABLET 81 MG: 81 TABLET CHEWABLE at 10:03

## 2024-09-16 RX ADMIN — ATORVASTATIN CALCIUM 40 MG: 40 TABLET, FILM COATED ORAL at 10:03

## 2024-09-16 RX ADMIN — APIXABAN 10 MG: 5 TABLET, FILM COATED ORAL at 10:03

## 2024-09-16 RX ADMIN — IPRATROPIUM BROMIDE AND ALBUTEROL SULFATE 3 ML: 2.5; .5 SOLUTION RESPIRATORY (INHALATION) at 08:33

## 2024-09-16 RX ADMIN — PREDNISONE 40 MG: 20 TABLET ORAL at 10:03

## 2024-09-16 RX ADMIN — METOPROLOL SUCCINATE 12.5 MG: 25 TABLET, EXTENDED RELEASE ORAL at 06:45

## 2024-09-16 RX ADMIN — PANTOPRAZOLE SODIUM 40 MG: 40 TABLET, DELAYED RELEASE ORAL at 06:44

## 2024-09-16 RX ADMIN — FUROSEMIDE 40 MG: 40 TABLET ORAL at 06:44

## 2024-09-16 RX ADMIN — EPLERENONE 25 MG: 25 TABLET, FILM COATED ORAL at 10:03

## 2024-09-16 RX ADMIN — GUAIFENESIN 600 MG: 600 TABLET, EXTENDED RELEASE ORAL at 10:04

## 2024-09-16 RX ADMIN — CEFUROXIME AXETIL 500 MG: 250 TABLET, FILM COATED ORAL at 10:03

## 2024-09-16 ASSESSMENT — ENCOUNTER SYMPTOMS
ARTHRALGIAS: 0
BLOOD IN STOOL: 0
PALPITATIONS: 0
NAUSEA: 0
SORE THROAT: 0
VOMITING: 1
SHORTNESS OF BREATH: 1
COUGH: 1
FLANK PAIN: 0
ACTIVITY CHANGE: 1
HEMATURIA: 0
DYSPHORIC MOOD: 0
JOINT SWELLING: 0
DIARRHEA: 0
SINUS PRESSURE: 0
APPETITE CHANGE: 1
EYE ITCHING: 0
WHEEZING: 0
EYE DISCHARGE: 0
CHEST TIGHTNESS: 1
HEADACHES: 0
WEAKNESS: 0
RHINORRHEA: 0
FEVER: 0
SLEEP DISTURBANCE: 0
NUMBNESS: 0
FATIGUE: 1
MYALGIAS: 0
DIZZINESS: 0
NERVOUS/ANXIOUS: 0
LIGHT-HEADEDNESS: 0
DYSURIA: 0
ABDOMINAL PAIN: 0
CONSTIPATION: 0
UNEXPECTED WEIGHT CHANGE: 0

## 2024-09-16 ASSESSMENT — COGNITIVE AND FUNCTIONAL STATUS - GENERAL
MOBILITY SCORE: 23
CLIMB 3 TO 5 STEPS WITH RAILING: A LITTLE
DAILY ACTIVITIY SCORE: 24

## 2024-09-16 ASSESSMENT — PAIN SCALES - GENERAL: PAINLEVEL_OUTOF10: 0 - NO PAIN

## 2024-09-16 NOTE — DISCHARGE SUMMARY
Discharge Diagnosis  Shortness of breath    Issues Requiring Follow-Up    Follow up with your PCP and cardiology (Dr. Knutson) on discharge      Discharge Meds     Medication List      START taking these medications     cefuroxime 500 mg tablet; Commonly known as: Ceftin; Take 1 tablet (500   mg) by mouth 2 times a day for 6 doses. Take with food   Eliquis 5 mg tablet; Generic drug: apixaban; Take 2 tablets (10 mg) by   mouth 2 times a day for 4 days, THEN 1 tablet (5 mg) 2 times a day there   after.; Start taking on: September 16, 2024   predniSONE 20 mg tablet; Commonly known as: Deltasone; Take 2 tablets   (40 mg) by mouth once daily for 3 doses. Take with food     CHANGE how you take these medications     ipratropium-albuteroL 0.5-2.5 mg/3 mL nebulizer solution; Commonly known   as: Duo-Neb; Take 3 mL by nebulization 2 times a day as needed for   wheezing or shortness of breath.; What changed: when to take this, reasons   to take this     CONTINUE taking these medications     albuterol 90 mcg/actuation inhaler; Commonly known as: Ventolin HFA;   Inhale 2 puffs every 4 hours if needed for wheezing or shortness of   breath.   Arnuity Ellipta 100 mcg/actuation inhaler; Generic drug: fluticasone   furoate; Inhale 1 puff once daily.   Aspirin Childrens 81 mg chewable tablet; Generic drug: aspirin   atorvastatin 40 mg tablet; Commonly known as: Lipitor   eplerenone 25 mg tablet; Commonly known as: Inspra   furosemide 40 mg tablet; Commonly known as: Lasix   guaiFENesin 600 mg 12 hr tablet; Commonly known as: Mucinex; Take 1   tablet (600 mg) by mouth 2 times a day. Do not crush, chew, or split.   metoprolol succinate XL 25 mg 24 hr tablet; Commonly known as: Toprol-XL       Test Results Pending At Discharge  Pending Labs       No current pending labs.          History Of Present Illness:   Radhames Ken is a 89 y.o. male presenting with cough. Pt states that he has been feeling short of breath at home and having  coughing fits. He sees Tyesha Gatica for pulmonology. He has no history of COPD or asthma and recently had normal PFTs. Imaging revealed bronchitis and mucous plugging. Pt is stable on RA. Moist cough noted on exam.      ED VS: T36.4, HR 91, RR 20, /66, Sp02 96%RA     Imaging: CXR- 1. No acute pulmonary pathology.  2. Stable 1 cm density in the mid left lung. This may represent a  granuloma.     -CT Chest: 1. No acute traumatic injury to the chest.  2.  There is some mild bronchial opacification in the lower lobes  bilaterally suggestive underlying bronchitis and/or mucous plugging.  This finding is new since prior exam.     Labs: Glu 109, Na 131, K 4.1, Bun/creat 20/1.38, Lactate 2.1, , Trop 20, WBC 8.2, H/H 13.6/40.7, Plt 139, covid/flu/rsv neg     Hospital Course:     #Bronchitis (improving)   #Acute PE   #Lactic acidosis (resolved)   -Increasing SOB with coughing fits at home   -No hx of asthma/COPD; recent workup with pulm and PFTs WNL  -WBC 8.2 > 5.0 > 9.7 > 8.7 > 6.5 > 5.5   -Covid/flu/RSV neg   -Lactate 2.1 > 2.2 > 1.4   -Procal 0.04   -CXR: 1. No acute pulmonary pathology. 2. Stable 1 cm density in the mid left lung. This may represent a granuloma.  -CT Chest: 1. No acute traumatic injury to the chest. 2.  There is some mild bronchial opacification in the lower lobes bilaterally suggestive underlying bronchitis and/or mucous plugging.  -D-dimer 5648  -CT PE: Small pulmonary embolus distal right upper lobar branch as described   -Continue apixaban 10 mg BID x 7 days then 5 mg BID   -Echo with LVEF 65-70%, no right heart strain  -Completed 3 days of azithromycin, now on cefuroxime 500 mg BID   -Solumedrol transitioned to prednisone 40 mg every day x 5 days   -Mucinex BID  -Bronchodilators TID  -Tessalon Perles TID PRN for cough   -Stable on RA, continue to monitor SpO2   -Cardiology consulted for acute PE     #Acute renal failure, resolved   -Bun/creat 20/1.38 >> 25/1.15 today   -Received 500ml bolus  in ED  -Given CHF history, will hold off additional fluids and monitor renal function  -Renally dose medications as able  -Daily BMP     #Thrombocytopenia  - Plts 141 > 126 > 115   - Monitor for active bleeding on apixaban   - Daily CBC      #Chronic combined systolic and diastolic heart failure   #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 There is mild left ventricular hypertrophy. Left ventricular systolic function is normal. EF = 53 ± 5% (2D biplane)   -Repeat echo as above   -Trop 20 > 18  -, appears euvolemic on exam   -Continue ASA, atorvastatin, eplerenone, furosemide, metoprolol succinate   -2g Na diet      DVT PPx: Apixaban   GI PPx: Protonix   Diet: 2g Na   Code Status: Full      Disposition: Patient stable for discharge home. He remains on room air with no respiratory distress. He is discharged on cefuroxime and prednisone for pneumonia. He is discharged on apixaban for PE; Rx delivered to bedside by pharmacy prior to discharge. Patient will follow up with his PCP and cardiology on discharge. Discharge plan reviewed with patient and wife at bedside, all questions answered.     Pertinent Physical Exam At Time of Discharge  Physical Exam  Constitutional:       General: He is not in acute distress.     Appearance: He is not toxic-appearing.   HENT:      Head: Normocephalic and atraumatic.      Mouth/Throat:      Mouth: Mucous membranes are moist.   Eyes:      Conjunctiva/sclera: Conjunctivae normal.   Cardiovascular:      Rate and Rhythm: Normal rate and regular rhythm.   Pulmonary:      Effort: No respiratory distress.      Breath sounds: Normal breath sounds.      Comments: On room air   Abdominal:      General: There is no distension.      Palpations: Abdomen is soft.      Tenderness: There is no abdominal tenderness.   Musculoskeletal:      Right lower leg: No edema.      Left lower leg: No edema.   Skin:     General: Skin is warm and dry.    Neurological:      Mental Status: He is alert and oriented to person, place, and time.   Psychiatric:         Mood and Affect: Mood normal.         Behavior: Behavior normal.         Outpatient Follow-Up  No future appointments.      Melissa Dominguez PA-C

## 2024-09-16 NOTE — CARE PLAN
The patient's goals for the shift include      The clinical goals for the shift include spo2 will remain above 92% or above    Over the shift, the patient did   Problem: Pain - Adult  Goal: Verbalizes/displays adequate comfort level or baseline comfort level  Outcome: Progressing

## 2024-09-16 NOTE — PROGRESS NOTES
Radhames Ken is a 89 y.o. male on day 4 of admission presenting with Shortness of breath.      Subjective   He is sitting up in the bed, feels better today. No complaints.       Review of systems:  Constitutional: negative for fever, chills, or malaise  Neuro: negative for dizziness, headache, numbness, tingling  ENT: Negative for nasal congestion or sore throat  Resp: negative for shortness of breath, positive for  cough and wheezing  CV: negative for chest pain, palpitations  GI: negative for abd pain, nausea, vomiting or diarrhea  : negative for dysuria, frequency, or urgency  Skin: negative for lesions, wounds, or rash  Musculoskeletal: Negative for weakness, myalgia, or arthralgia  Endocrine: Negative for polyuria or polydipsia    Objective   Constitutional: Well developed, awake/alert/oriented x3, no distress, alert and cooperative  Eyes: PERRL, EOMI, clear sclera  ENMT: mucous membranes moist, no apparent injury, no lesions seen  Head/Neck: Neck supple, no apparent injury, thyroid without mass or tenderness, No JVD, trachea midline, no bruits  Respiratory/Thorax: Patent airways, CTAB, normal breath sounds with good chest expansion, thorax symmetric  Cardiovascular: Regular, rate and rhythm, no murmurs, 2+ equal pulses of the extremities, normal S 1and S 2  Gastrointestinal: Nondistended, soft, non-tender, no rebound tenderness or guarding, no masses palpable, no organomegaly, +BS, no bruits  Musculoskeletal: ROM intact, no joint swelling, normal strength  Extremities: normal extremities, no cyanosis edema, contusions or wounds, no clubbing  Neurological: alert and oriented x3, intact senses, motor, response and reflexes, normal strength  Lymphatic: No significant lymphadenopathy  Psychological: Appropriate mood and behavior  Skin: Warm and dry, no lesions, no rashes      Last Recorded Vitals  /77 (BP Location: Left arm, Patient Position: Lying)   Pulse 81   Temp 36.8 °C (98.2 °F) (Temporal)    "Resp 17   Ht 1.803 m (5' 11\")   Wt 64.5 kg (142 lb 1.6 oz)   SpO2 97%   BMI 19.82 kg/m²     Intake/Output last 3 Shifts:  I/O last 3 completed shifts:  In: 360 (5.6 mL/kg) [P.O.:360]  Out: - (0 mL/kg)   Weight: 64.5 kg   No intake/output data recorded.    Relevant Results  Scheduled medications  apixaban, 10 mg, oral, BID   Followed by  [START ON 9/20/2024] apixaban, 5 mg, oral, BID  aspirin, 81 mg, oral, Daily  atorvastatin, 40 mg, oral, Daily  cefuroxime, 500 mg, oral, BID  eplerenone, 25 mg, oral, Daily  furosemide, 40 mg, oral, Daily  guaiFENesin, 600 mg, oral, BID  ipratropium-albuteroL, 3 mL, nebulization, TID  metoprolol succinate XL, 12.5 mg, oral, Daily  pantoprazole, 40 mg, oral, Daily before breakfast  predniSONE, 40 mg, oral, Daily      Continuous medications  sodium chloride 0.9%, 10 mL/hr, Last Rate: 10 mL/hr (09/14/24 0549)      PRN medications  PRN medications: acetaminophen, acetaminophen, albuterol, benzocaine-menthol, benzonatate, ondansetron, polyethylene glycol, sodium chloride 0.9%    Results for orders placed or performed during the hospital encounter of 09/11/24 (from the past 24 hour(s))   CBC   Result Value Ref Range    WBC 5.5 4.4 - 11.3 x10*3/uL    nRBC 0.0 0.0 - 0.0 /100 WBCs    RBC 4.36 (L) 4.50 - 5.90 x10*6/uL    Hemoglobin 13.0 (L) 13.5 - 17.5 g/dL    Hematocrit 38.9 (L) 41.0 - 52.0 %    MCV 89 80 - 100 fL    MCH 29.8 26.0 - 34.0 pg    MCHC 33.4 32.0 - 36.0 g/dL    RDW 13.2 11.5 - 14.5 %    Platelets 115 (L) 150 - 450 x10*3/uL   Basic Metabolic Panel   Result Value Ref Range    Glucose 98 74 - 99 mg/dL    Sodium 133 (L) 136 - 145 mmol/L    Potassium 3.9 3.5 - 5.3 mmol/L    Chloride 101 98 - 107 mmol/L    Bicarbonate 26 21 - 32 mmol/L    Anion Gap 10 10 - 20 mmol/L    Urea Nitrogen 25 (H) 6 - 23 mg/dL    Creatinine 1.15 0.50 - 1.30 mg/dL    eGFR 60 (L) >60 mL/min/1.73m*2    Calcium 8.2 (L) 8.6 - 10.3 mg/dL       Transthoracic Echo (TTE) Complete   Final Result      CT angio chest " for pulmonary embolism   Final Result   Small pulmonary embolus distal right upper lobar branch as described        MACRO:   None        Signed by: Carlos Feldman 9/12/2024 12:07 PM   Dictation workstation:   [id number]      CT chest wo IV contrast   Final Result   1. No acute traumatic injury to the chest.   2.  There is some mild bronchial opacification in the lower lobes   bilaterally suggestive underlying bronchitis and/or mucous plugging.   This finding is new since prior exam.   Signed by Gordon Blackmon MD      XR chest 2 views   Final Result   1. No acute pulmonary pathology.   2. Stable 1 cm density in the mid left lung. This may represent a   granuloma.   Signed by Chris Sims MD          Transthoracic Echo (TTE) Complete    Result Date: 9/13/2024   [facility], [address] [place] [address]              Tel [phone] and Fax [phone] TRANSTHORACIC ECHOCARDIOGRAM REPORT  Patient Name:      MELISSA MARTINEZ LIZZ     Reading Physician:    [id number] Eamon Kirby MD Study Date:        9/13/2024            Ordering Provider:    [id number] MCNEAL MRN/PID:           [id number]             Fellow: Accession#:        [id number]         Nurse: Date of Birth/Age: 11/11/1933 / 89      Sonographer:          Zuleyka Lopez RDCS                    years Gender:            M                    Additional Staff: Height:            180.34 cm            Admit Date:           9/11/2024 Weight:            64.41 kg             Admission Status:     Inpatient -                                                               Routine BSA / BMI:         1.82 m2 / 19.81      Encounter#:           [id number]                    kg/m2 Blood Pressure:    120/71 mmHg          Department Location:  [facility] Study  Type:    TRANSTHORACIC ECHO (TTE) COMPLETE Diagnosis/ICD: Other pulmonary embolism without acute cor pulmonale-I26.99 Indication:    small PE CPT Code:      Echo Complete w Full Doppler-[id number] Patient History: Pertinent History: PE, HTN, HLD, CHF, CAD, SOB, AS s/p TAVR 26 singh 3 crystal                    3/20/23. Study Detail: The following Echo studies were performed: 2D, M-Mode, Doppler and               color flow. Technically challenging study due to prominent lung               artifact.  PHYSICIAN INTERPRETATION: Left Ventricle: The left ventricular systolic function is normal, with a visually estimated ejection fraction of 55-60%. There are no regional left ventricular wall motion abnormalities. The left ventricular cavity size is normal. There is mild left ventricular hypertrophy involving the septal wall. Spectral Doppler shows an impaired relaxation pattern of left ventricular diastolic filling. Left Atrium: The left atrium is mildly dilated. Right Ventricle: The right ventricle is normal in size. There is normal right ventricular global systolic function. Right Atrium: The right atrium is normal in size. Aortic Valve: There is a prosthetic aortic valve present. The aortic valve dimensionless index is 0.59. There is transcatheter aortic valve replacement. Echo findings are consistent with normal aortic valve prosthesis structure and function. There is no evidence of aortic valve regurgitation. The peak instantaneous gradient of the aortic valve is 21.5 mmHg. The mean gradient of the aortic valve is 13.0 mmHg. Mitral Valve: The mitral valve is mildly thickened. There is moderate mitral valve regurgitation which is anteriorly directed. There is flow reversal in the right pulmonary vein. Tricuspid Valve: The tricuspid valve is structurally normal. There is mild tricuspid regurgitation. Pulmonic Valve: The pulmonic valve is structurally normal. There is physiologic pulmonic valve regurgitation. Pericardium:  There is no pericardial effusion noted. Aorta: The aortic root is normal. Pulmonary Artery: The tricuspid regurgitant velocity is 2.45 m/s, and with an estimated right atrial pressure of 3 mmHg, the estimated pulmonary artery pressure is borderline elevated with the RVSP at 27.0 mmHg. Systemic Veins: The inferior vena cava appears to be of normal size, IVC inspiratory collapse greater than 50%.  CONCLUSIONS:  1. The left ventricular systolic function is normal, with a visually estimated ejection fraction of 55-60%.  2. Spectral Doppler shows an impaired relaxation pattern of left ventricular diastolic filling.  3. There is normal right ventricular global systolic function.  4. The left atrium is mildly dilated.  5. Moderate mitral valve regurgitation.  6. There is transcatheter aortic valve replacement.  7. Echo findings are consistent with normal aortic valve prosthesis structure and function. QUANTITATIVE DATA SUMMARY:  2D MEASUREMENTS:          Normal Ranges: Ao Root d:       2.50 cm  (2.0-3.7cm) LVEDV Index:     73 ml/m2  LV SYSTOLIC FUNCTION BY 2D PLANIMETRY (MOD):                      Normal Ranges: EF-A4C View:    46 % (>=55%) EF-A2C View:    51 % EF-Biplane:     49 % EF-Visual:      58 % LV EF Reported: 58 %  LV DIASTOLIC FUNCTION:             Normal Ranges: MV Peak E:             1.01 m/s    (0.7-1.2 m/s) MV Peak A:             1.41 m/s    (0.42-0.7 m/s) E/A Ratio:             0.72        (1.0-2.2) MV e'                  0.095 m/s   (>8.0) MV lateral e'          0.10 m/s MV medial e'           0.09 m/s MV A Dur:              127.00 msec E/e' Ratio:            10.61       (<8.0) MV DT:                 174 msec    (150-240 msec)  MITRAL VALVE:          Normal Ranges: MV DT:        174 msec (150-240msec)  MITRAL INSUFFICIENCY:             Normal Ranges: PISA Radius:          0.5 cm MR VTI:               98.80 cm MR Vmax:              339.00 cm/s  AORTIC VALVE:                      Normal Ranges: AoV Vmax:                 2.32 m/s  (<=1.7m/s) AoV Peak P.5 mmHg (<20mmHg) AoV Mean P.0 mmHg (1.7-11.5mmHg) LVOT Max Josep:            1.26 m/s  (<=1.1m/s) AoV VTI:                 46.20 cm  (18-25cm) LVOT VTI:                27.10 cm LVOT Diameter:           2.30 cm   (1.8-2.4cm) AoV Area, VTI:           2.44 cm2  (2.5-5.5cm2) AoV Area,Vmax:           2.26 cm2  (2.5-4.5cm2) AoV Dimensionless Index: 0.59  RIGHT VENTRICLE: TAPSE: 25.0 mm RV s'  0.14 m/s  TRICUSPID VALVE/RVSP:          Normal Ranges: Peak TR Velocity:     2.45 m/s RV Syst Pressure:     27 mmHg  (< 30mmHg) IVC Diam:             1.82 cm  [id number] Eamon Kirby MD Electronically signed on 2024 at 6:58:40 PM  ** Final **     Transthoracic Echo (TTE) Complete    Result Date: 2023   [facility], [address] [place] [address]              Tel [phone] and Fax [phone] TRANSTHORACIC ECHOCARDIOGRAM REPORT  Patient Name:      MELISSA ZAMUDIO     Leticia Physician:    [id number] Eamon Kirby MD Study Date:        2023           Ordering Provider:    [id number] LEANNA ANDERSON MRN/PID:           [id number]             Fellow: Accession#:        [id number]         Nurse: Date of Birth/Age: 1933      Sonographer:          Wilver Sun RDCS                    years Gender:            M                    Additional Staff: Height:            180.34 cm            Admit Date:           2023 Weight:            73.48 kg             Admission Status:     Inpatient -                                                               Routine BSA:               1.93 m2              Encounter#:           [id number]                                         Department Location:  [facility]                                                               Department Blood  Pressure: 153 /64 mmHg Study Type:    TRANSTHORACIC ECHO (TTE) COMPLETE Diagnosis/ICD: Chronic diastolic (congestive) heart failure (CHF)-I50.32 Indication:    Congestive Heart Failure CPT Code:      Echo Complete w Full Doppler-[id number] Patient History: Pertinent History: HTN, HLD, CVA, AS, CHF. Study Detail: The following Echo studies were performed: 2D, M-Mode, Doppler and               color flow.  PHYSICIAN INTERPRETATION: Left Ventricle: The left ventricular systolic function is moderately decreased, with an estimated ejection fraction of 35-40%. Wall motion is abnormal. The left ventricular cavity size is mildly dilated. There is mild concentric left ventricular hypertrophy. Findings are consistent with ischemic cardiomyopathy. Spectral Doppler shows an impaired relaxation pattern of left ventricular diastolic filling. LV Wall Scoring: The basal and mid inferior wall and basal and mid inferolateral wall are akinetic. The basal and mid anterolateral wall and apical inferior segment are hypokinetic. All remaining scored segments are normal. Left Atrium: The left atrium is normal in size. Right Ventricle: The right ventricle is normal in size. There is normal right ventricular global systolic function. Right Atrium: The right atrium is normal in size. Aortic Valve: The aortic valve is probably trileaflet. There is moderate to severe aortic valve cusp calcification. There is evidence of severe aortic valve stenosis. There is mild aortic valve regurgitation. The peak instantaneous gradient of the aortic valve is 59.6 mmHg. The mean gradient of the aortic valve is 33.0 mmHg. Mitral Valve: The mitral valve is mildly thickened. There is moderate mitral annular calcification. There is moderate mitral valve regurgitation which is eccentrically directed. 2 MR jets : one anteriorly and one posteriorly directed. Tricuspid Valve: The tricuspid valve is structurally normal. There is mild tricuspid regurgitation. Pulmonic  Valve: The pulmonic valve is structurally normal. There is mild pulmonic valve regurgitation. Pericardium: There is no pericardial effusion noted. Aorta: The aortic root is normal. Pulmonary Artery: The tricuspid regurgitant velocity is 2.74 m/s, and with an estimated right atrial pressure of 3 mmHg, the estimated pulmonary artery pressure is mildly elevated with the RVSP at 33.0 mmHg. Pulmonary Veins: The pulmonary veins appear normal and return normally to the left atrium. Systemic Veins: The inferior vena cava appears to be of normal size. There is IVC inspiratory collapse greater than 50%.  CONCLUSIONS:  1. Left ventricular systolic function is moderately decreased with a 35-40% estimated ejection fraction.  2. Basal and mid inferior wall, basal and mid inferolateral wall, basal and mid anterolateral wall, and apical inferior segment are abnormal.  3. Spectral Doppler shows an impaired relaxation pattern of left ventricular diastolic filling.  4. There is ischemic cardiomyopathy.  5. 2 MR jets : one anteriorly and one posteriorly directed.  6. There is moderate mitral annular calcification.  7. Moderate mitral valve regurgitation.  8. Severe aortic valve stenosis.  9. There is moderate to severe aortic valve cusp calcification. 10. Mild aortic valve regurgitation. 11. Mild pulmonary HTN, estimated CVP is normal. QUANTITATIVE DATA SUMMARY: 2D MEASUREMENTS:                    Normal Ranges: Ao Root d: 3.00 cm (2.0-3.7cm) LA VOLUME:                               Normal Ranges: LA Vol A4C:        50.8 ml    (22+/-6mL/m2) LA Vol A2C:        55.8 ml LA Vol BP:         54.2 ml LA Vol Index A4C:  26.4ml/m2 LA Vol Index A2C:  28.9 ml/m2 LA Vol Index BP:   28.1 ml/m2 LA Area A4C:       17.8 cm2 LA Area A2C:       19.0 cm2 LA Major Axis A4C: 5.3 cm LA Major Axis A2C: 5.5 cm RA VOLUME BY A/L METHOD:                               Normal Ranges: RA Vol A4C:        31.4 ml    (8.3-19.5ml) RA Vol Index A4C:  16.3 ml/m2 RA Area  A4C:       12.6 cm2 RA Major Axis A4C: 4.3 cm AORTA MEASUREMENTS:                    Normal Ranges: Asc Ao, d: 3.00 cm (2.1-3.4cm) LV SYSTOLIC FUNCTION BY 2D PLANIMETRY (MOD):                     Normal Ranges: EF-A4C View: 48.5 % (>=55%) EF-A2C View: 52.0 % EF-Biplane:  50.4 % LV DIASTOLIC FUNCTION:                               Normal Ranges: MV Peak E:        0.92 m/s    (0.7-1.2 m/s) MV Peak A:        1.47 m/s    (0.42-0.7 m/s) E/A Ratio:        0.63        (1.0-2.2) MV e'             0.06 m/s    (>8.0) MV lateral e'     0.07 m/s MV medial e'      0.06 m/s E/e' Ratio:       14.18       (<8.0) PulmV Sys Josep:    56.20 cm/s PulmV Cohen Josep:   32.10 cm/s PulmV S/D Josep:    1.80 PulmV A Revs Josep: 58.60 cm/s PulmV A Revs Dur: 123.00 msec MITRAL VALVE:                 Normal Ranges: MV DT: 175 msec (150-240msec) MITRAL INSUFFICIENCY:                           Normal Ranges: PISA Radius:  0.7 cm MR VTI:       198.00 cm MR Vmax:      621.00 cm/s MR Alias Josep: 38.5 cm/s MR Volume:    37.79 ml MR Flow Rt:   118.53 ml/s MR EROA:      0.19 cm2 AORTIC VALVE:                                    Normal Ranges: AoV Vmax:                3.86 m/s  (<=1.7m/s) AoV Peak P.6 mmHg (<20mmHg) AoV Mean P.0 mmHg (1.7-11.5mmHg) LVOT Max Josep:            1.10 m/s  (<=1.1m/s) AoV VTI:                 87.10 cm  (18-25cm) LVOT VTI:                22.30 cm LVOT Diameter:           2.00 cm   (1.8-2.4cm) AoV Area, VTI:           0.80 cm2  (2.5-5.5cm2) AoV Area,Vmax:           0.90 cm2  (2.5-4.5cm2) AoV Dimensionless Index: 0.26  RIGHT VENTRICLE: RV Basal 2.60 cm RV Mid   1.20 cm RV Major 6.4 cm TAPSE:   24.8 mm RV s'    0.11 m/s TRICUSPID VALVE/RVSP:                             Normal Ranges: Peak TR Velocity: 2.74 m/s RV Syst Pressure: 33.0 mmHg (< 30mmHg) IVC Diam:         1.80 cm Pulmonary Veins: PulmV A Revs Dur: 123.00 msec PulmV A Revs Josep: 58.60 cm/s PulmV Cohen Josep:   32.10 cm/s PulmV S/D Josep:    1.80 PulmV Sys  Josep:    56.20 cm/s  [id number] Eamon Kirby MD Electronically signed on 11/26/2023 at 3:16:08 PM  Wall Scoring  ** Final **           Assessment/Plan   Principal Problem:    Shortness of breath  Active Problems:    HTN (hypertension)    Hyperlipemia    Bronchitis    Acute renal failure (ARF) (CMS-HCC)    Chronic combined systolic and diastolic heart failure (Multi)    S/P TAVR (transcatheter aortic valve replacement)    Coronary artery disease involving native coronary artery of native heart without angina pectoris    Dyspnea, unspecified type    Acute pulmonary embolism (Multi)    Lactic acidosis    Thrombocytopenia (CMS-HCC)    Bronchitis  -CXR showed negative  -CT chest showed bronchitis, mucous plugging  -antibiotics  -bronchodilators  -oxygen support  -sputum culture     2. Acute Pulmonary emboli  -CTA of the chest reviewed  -Started on heparin gtt and now transitioned to Eliquis  -Echocardiogram reviewed as above, no evidence of right heart strain.   -Currently on room air     3. S/p TAVR  -March 2024 at [facility]  -Echocardiogram reviewed as above    4. Chronic diastolic/systolic chf  -appears euvolemic  -  -2gm na diet  -eplerenone, furosemide, metoprolol succinate   -Echo as above     5. Hypertension  -stable  -2gm na diet  -cont meds  -monitor     6. Hyperlipidemia  -Cont statin         VELMA Valadez-CNP

## 2024-09-16 NOTE — PROGRESS NOTES
09/16/24 1002   Discharge Planning   Assistance Needed Patient and wife in agreement with discharge plan to go home with no needs. Patient unable to use copay eliquis card due to having part D, per outpatient pharmacy, copay will be $35 for 30 days.   Who is requesting discharge planning? Provider   Home or Post Acute Services None   Expected Discharge Disposition Home     9/16/24 1021 IMM obtained.

## 2024-09-16 NOTE — CARE PLAN
The clinical goals for the shift include Patient will maintain SPO2 at or above 92%    Over the shift, the patient maintained adequate SPO2 and all other vitals remained stable. No reports of pain this shift, appetite has been good. Wife has been at bedside all shift, discharge instructions reviewed with both patient and wife who confirmed understanding. IV removed and last vitals obtained. Patient discharged home at 1220.

## 2024-09-16 NOTE — NURSING NOTE
Assumed patient care. No immediate needs at this time. Patient resting in bed. Family at bedside. Call light within reach

## 2024-09-17 ENCOUNTER — PATIENT OUTREACH (OUTPATIENT)
Dept: CARE COORDINATION | Facility: CLINIC | Age: 89
End: 2024-09-17
Payer: MEDICARE

## 2024-09-17 NOTE — PROGRESS NOTES
[facility]  Admitted 9/11/2024  Discharged 9/16/2024  Dx: Bronchitis, Acute Pulmonary Embolism, Lactic Acidosis    Outreach call to patient to support a smooth transition of care from recent admission.  Spoke with Wiliam (dtr), she states, she already spoke with a nurse today about the same thing. She states, she spoke with Radhames's doctor today as he was having hallucinations which they think is from the Prednisone. The Prednisone was decreased from 40mg to 20mg for 2 more days.      Initial assessment deferred at this time Wiliam as already spoken with a nurse regarding Radhames's discharge.    Wiliam is agreeable to the 2 additional outreach calls.    Will continue to monitor through transition period.    Rachel Lopez RN/CM   [facility]  [phone]

## 2024-09-18 ENCOUNTER — APPOINTMENT (OUTPATIENT)
Dept: CARDIOLOGY | Facility: HOSPITAL | Age: 89
End: 2024-09-18
Payer: MEDICARE

## 2024-09-18 ENCOUNTER — APPOINTMENT (OUTPATIENT)
Dept: RADIOLOGY | Facility: HOSPITAL | Age: 89
End: 2024-09-18
Payer: MEDICARE

## 2024-09-18 ENCOUNTER — HOSPITAL ENCOUNTER (EMERGENCY)
Facility: HOSPITAL | Age: 89
Discharge: HOME | End: 2024-09-18
Payer: MEDICARE

## 2024-09-18 VITALS
DIASTOLIC BLOOD PRESSURE: 82 MMHG | RESPIRATION RATE: 16 BRPM | SYSTOLIC BLOOD PRESSURE: 130 MMHG | BODY MASS INDEX: 22.4 KG/M2 | WEIGHT: 160 LBS | HEART RATE: 76 BPM | TEMPERATURE: 97 F | OXYGEN SATURATION: 97 % | HEIGHT: 71 IN

## 2024-09-18 DIAGNOSIS — I95.9 TRANSIENT HYPOTENSION: Primary | ICD-10-CM

## 2024-09-18 LAB
ALBUMIN SERPL BCP-MCNC: 3.6 G/DL (ref 3.4–5)
ALP SERPL-CCNC: 139 U/L (ref 33–136)
ALT SERPL W P-5'-P-CCNC: 45 U/L (ref 10–52)
ANION GAP SERPL CALC-SCNC: 11 MMOL/L (ref 10–20)
AST SERPL W P-5'-P-CCNC: 34 U/L (ref 9–39)
BASOPHILS # BLD AUTO: 0.07 X10*3/UL (ref 0–0.1)
BASOPHILS NFR BLD AUTO: 0.6 %
BILIRUB SERPL-MCNC: 0.9 MG/DL (ref 0–1.2)
BNP SERPL-MCNC: 75 PG/ML (ref 0–99)
BUN SERPL-MCNC: 36 MG/DL (ref 6–23)
CALCIUM SERPL-MCNC: 8.4 MG/DL (ref 8.6–10.3)
CARDIAC TROPONIN I PNL SERPL HS: 17 NG/L (ref 0–20)
CHLORIDE SERPL-SCNC: 98 MMOL/L (ref 98–107)
CO2 SERPL-SCNC: 25 MMOL/L (ref 21–32)
CREAT SERPL-MCNC: 1.33 MG/DL (ref 0.5–1.3)
EGFRCR SERPLBLD CKD-EPI 2021: 51 ML/MIN/1.73M*2
EOSINOPHIL # BLD AUTO: 0.29 X10*3/UL (ref 0–0.4)
EOSINOPHIL NFR BLD AUTO: 2.3 %
ERYTHROCYTE [DISTWIDTH] IN BLOOD BY AUTOMATED COUNT: 13.7 % (ref 11.5–14.5)
GLUCOSE SERPL-MCNC: 97 MG/DL (ref 74–99)
HCT VFR BLD AUTO: 43.4 % (ref 41–52)
HGB BLD-MCNC: 14.5 G/DL (ref 13.5–17.5)
IMM GRANULOCYTES # BLD AUTO: 0.73 X10*3/UL (ref 0–0.5)
IMM GRANULOCYTES NFR BLD AUTO: 5.9 % (ref 0–0.9)
INR PPP: 1.9 (ref 0.9–1.1)
LYMPHOCYTES # BLD AUTO: 1.57 X10*3/UL (ref 0.8–3)
LYMPHOCYTES NFR BLD AUTO: 12.6 %
MAGNESIUM SERPL-MCNC: 2.37 MG/DL (ref 1.6–2.4)
MCH RBC QN AUTO: 30.6 PG (ref 26–34)
MCHC RBC AUTO-ENTMCNC: 33.4 G/DL (ref 32–36)
MCV RBC AUTO: 92 FL (ref 80–100)
MONOCYTES # BLD AUTO: 0.96 X10*3/UL (ref 0.05–0.8)
MONOCYTES NFR BLD AUTO: 7.7 %
NEUTROPHILS # BLD AUTO: 8.81 X10*3/UL (ref 1.6–5.5)
NEUTROPHILS NFR BLD AUTO: 70.9 %
NRBC BLD-RTO: 0 /100 WBCS (ref 0–0)
OVALOCYTES BLD QL SMEAR: NORMAL
PLATELET # BLD AUTO: 135 X10*3/UL (ref 150–450)
POTASSIUM SERPL-SCNC: 4.8 MMOL/L (ref 3.5–5.3)
PROT SERPL-MCNC: 5.7 G/DL (ref 6.4–8.2)
PROTHROMBIN TIME: 22.1 SECONDS (ref 9.8–12.8)
RBC # BLD AUTO: 4.74 X10*6/UL (ref 4.5–5.9)
RBC MORPH BLD: NORMAL
SARS-COV-2 RNA RESP QL NAA+PROBE: NOT DETECTED
SODIUM SERPL-SCNC: 129 MMOL/L (ref 136–145)
WBC # BLD AUTO: 12.4 X10*3/UL (ref 4.4–11.3)

## 2024-09-18 PROCEDURE — 87635 SARS-COV-2 COVID-19 AMP PRB: CPT | Performed by: HEALTH CARE PROVIDER

## 2024-09-18 PROCEDURE — 83735 ASSAY OF MAGNESIUM: CPT | Performed by: HEALTH CARE PROVIDER

## 2024-09-18 PROCEDURE — 71046 X-RAY EXAM CHEST 2 VIEWS: CPT | Performed by: RADIOLOGY

## 2024-09-18 PROCEDURE — 84075 ASSAY ALKALINE PHOSPHATASE: CPT | Performed by: HEALTH CARE PROVIDER

## 2024-09-18 PROCEDURE — 96360 HYDRATION IV INFUSION INIT: CPT

## 2024-09-18 PROCEDURE — 85025 COMPLETE CBC W/AUTO DIFF WBC: CPT | Performed by: HEALTH CARE PROVIDER

## 2024-09-18 PROCEDURE — 85610 PROTHROMBIN TIME: CPT | Performed by: HEALTH CARE PROVIDER

## 2024-09-18 PROCEDURE — 84484 ASSAY OF TROPONIN QUANT: CPT | Performed by: HEALTH CARE PROVIDER

## 2024-09-18 PROCEDURE — 71046 X-RAY EXAM CHEST 2 VIEWS: CPT

## 2024-09-18 PROCEDURE — 99283 EMERGENCY DEPT VISIT LOW MDM: CPT

## 2024-09-18 PROCEDURE — 2500000004 HC RX 250 GENERAL PHARMACY W/ HCPCS (ALT 636 FOR OP/ED): Performed by: HEALTH CARE PROVIDER

## 2024-09-18 PROCEDURE — 36415 COLL VENOUS BLD VENIPUNCTURE: CPT | Performed by: HEALTH CARE PROVIDER

## 2024-09-18 PROCEDURE — 83880 ASSAY OF NATRIURETIC PEPTIDE: CPT | Performed by: HEALTH CARE PROVIDER

## 2024-09-18 PROCEDURE — 93005 ELECTROCARDIOGRAM TRACING: CPT

## 2024-09-18 ASSESSMENT — COLUMBIA-SUICIDE SEVERITY RATING SCALE - C-SSRS
6. HAVE YOU EVER DONE ANYTHING, STARTED TO DO ANYTHING, OR PREPARED TO DO ANYTHING TO END YOUR LIFE?: NO
1. IN THE PAST MONTH, HAVE YOU WISHED YOU WERE DEAD OR WISHED YOU COULD GO TO SLEEP AND NOT WAKE UP?: NO
2. HAVE YOU ACTUALLY HAD ANY THOUGHTS OF KILLING YOURSELF?: NO

## 2024-09-18 ASSESSMENT — PAIN SCALES - GENERAL: PAINLEVEL_OUTOF10: 0 - NO PAIN

## 2024-09-18 ASSESSMENT — PAIN - FUNCTIONAL ASSESSMENT: PAIN_FUNCTIONAL_ASSESSMENT: 0-10

## 2024-09-18 NOTE — ED TRIAGE NOTES
Pt walks in with complaint of low bp. Bp taken at 132/68. Pt left [facility] yesterday after being admitted for about a week for wheezing and a blood clot in his lung. Today pt states his only complaint his bp going to low. Pt takes bp meds. 0/10 pain

## 2024-09-18 NOTE — ED PROVIDER NOTES
HPI   Chief Complaint   Patient presents with    Hypotension     Pt walks in with complaint of low bp. Bp taken at 132/68. Pt left [facility] yesterday after being admitted for about a week for wheezing and a blood clot in his lung. Today pt states his only complaint his bp going to low. Pt takes bp meds. 0/10 pain        CC: Transient episodes of hypotension, dizziness  HPI:   89-year-old male who was discharged 24 hours ago from [facility] he was inpatient for 4 days, he was initially seen for shortness of breath, found to have a right distal lobar pulmonary emboli, he was started on Eliquis, patient also has history of chronic kidney disease, CHF, hypertension, hyperlipidemia, patient reported taking his blood pressure at home today with systolics around the upper 80s or 90s, in the triage his blood pressure is normotensive.  He reports feeling fine, denies any recent falls.    Additional Limitations to History:   External Records Reviewed: I reviewed recent and relevant outside records including   History Obtained From:     Past Medical History: Per HPI  Medications: Reviewed in EMR and with patient  Allergies:  Reviewed in EMR  Past Surgical History:   Social History:     ------------------------------------------------------------------------------------------------------  Physical Exam:  --Vital signs reviewed in nursing triage note, EMR flow sheets, and at patient's bedside  GEN:  A&Ox3, no acute distress, appears comfortable.  Conversational and appropriate.  No confusion or gross mental status changes.  EYES: EOMI, non-injected sclera.  ENT: Moist mucous membranes, no apparent injuries or lesions.   CARDIO: Normal rate and regular rhythm. No murmurs, rubs, or gallops.  2+ equal pulses of the distal extremities.   PULM: Clear to auscultation bilaterally. No rales, rhonchi, or wheezes. Good symmetric chest expansion.  GI: Soft, non-tender, non-distended. No rebound tenderness or guarding.  SKIN: Warm and dry, no  rashes or lesions.  MSK: ROM intact the extremities without contractures.   EXT: No peripheral edema, contusions, or wounds.   NEURO: Cranial nerves II-XII grossly intact. Sensation to light touch intact and equal bilaterally in upper and lower extremities.  Symmetric 5/5 strength in upper and lower extremities.  PSYCH: Appropriate mood and behavior, converses and responds appropriately during exam.  -------------------------------------------------------------------------------------------------------       Differential Diagnoses Considered:   Chronic Medical Conditions Significantly Affecting Care:   Diagnostic testing considered: [PERC, D-Dimer, PECARN, etc.]    - EKG interpreted by myself sinus rhythm occasional PVCs, ventricular 81 SD interval 176 normal QRS duration of prolonged QT/QTc no obvious ST elevation, depression or acute ischemic findings.  - I independently interpreted: [CXR, CT, POCUS, etc. including your interpretation]  - Labs notable for     Escalation of Care: Appropriate for   Social Determinants of Health Significantly Affecting Care: [Homelessness, lacking transportation, uninsured, unable to afford medications]  Prescription Drug Consideration: [Antibiotics, antivirals, pain medications, etc.]  Discussion of Management with Other Providers:  I discussed the patient/results with: [admitting team, consultant, radiologist, social work, EPAT, case management, PT/OT, RT, PCP, etc.]      Jose Hernandez PA-C              Patient History   Past Medical History:   Diagnosis Date    CHF (congestive heart failure) (Multi)     Chronic sinusitis, unspecified 03/28/2017    Sinobronchitis    COVID-19 05/05/2022    COVID-19    COVID-19 05/05/2022    Pneumonia due to COVID-19 virus    Eczema     Elevated prostate specific antigen (PSA)     Rising PSA level    Encounter for screening for malignant neoplasm, site unspecified 07/24/2018    Cancer screening    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    Diarrhea,  functional    Heart disease     HL (hearing loss)     Hypertension     Other disorders of plasma-protein metabolism, not elsewhere classified 05/30/2017    Serum albumin decreased    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    Personal history of diseases of the skin and subcutaneous tissue 02/18/2019    History of eczema    Personal history of other diseases of male genital organs     History of BPH    Personal history of other diseases of the circulatory system     History of hypertension    Personal history of other diseases of the circulatory system     History of mitral valve insufficiency    Personal history of other diseases of the musculoskeletal system and connective tissue     Personal history of arthritis    Personal history of other endocrine, nutritional and metabolic disease     History of hyperlipidemia    Personal history of other specified conditions 11/09/2018    History of seizure    Personal history of other specified conditions 05/05/2022    History of vomiting    Stroke (Multi)     Visual impairment      Past Surgical History:   Procedure Laterality Date    CARDIAC SURGERY  04/28/2014    Heart Surgery    CHOLECYSTECTOMY  04/28/2014    Cholecystectomy    GALLBLADDER SURGERY  04/28/2014    Gallbladder Surgery    MR HEAD ANGIO WO IV CONTRAST  9/30/2018    MR HEAD ANGIO WO IV CONTRAST 9/30/2018 [facility]INICAL [facility]GAC[facility]    MR NECK ANGIO WO IV CONTRAST  9/30/2018    MR NECK ANGIO WO IV CONTRAST 9/30/2018 [facility] CLINICAL LEGAC[facility]    OTHER SURGICAL HISTORY  04/28/2014    Knee Repair    OTHER SURGICAL HISTORY  04/28/2014    Needle Biopsy Of Prostate    OTHER SURGICAL HISTORY  03/05/2021    Complete colonoscopy    OTHER SURGICAL HISTORY  03/05/2021    Endoscopy    OTHER SURGICAL HISTORY  10/08/2018    Common Bile Duct Stone Removal    PROSTATE SURGERY  10/08/2018    Prostate Surgery     Family History   Problem Relation Name Age of Onset    Heart disease Mother Abiola     Breast cancer  Mother Abiola     Lung cancer Father      Cancer Brother Arpit     Cancer Brother Kevin     Cancer Brother Tere     Cancer Brother       Social History     Tobacco Use    Smoking status: Never    Smokeless tobacco: Never   Vaping Use    Vaping status: Never Used   Substance Use Topics    Alcohol use: Not Currently    Drug use: Never       Physical Exam   ED Triage Vitals [09/18/24 1528]   Temperature Heart Rate Respirations BP   36.1 °C (97 °F) 73 14 132/68      Pulse Ox Temp Source Heart Rate Source Patient Position   95 % Tympanic -- Sitting      BP Location FiO2 (%)     Left arm --       Physical Exam      ED Course & MDM   ED Course as of 09/19/24 1354   Wed Sep 18, 2024   1724 ECG 12 lead  EKG independently interpreted and reviewed by me shows sinus rhythm with rate of 81.  Occasional PVCs.  No acute injury pattern. [BT]      ED Course User Index  [BT] Calderon Silva,          Diagnoses as of 09/19/24 1354   Transient hypotension                 No data recorded                                 Medical Decision Making  89-year-old male who was recently diagnosed with pulmonary emboli who has a history of hypertension hyperlipidemia and heart failure was at home and had a single episode of hypotension associated with brief episode of dizziness, he is on 12.5 mg of metoprolol he also takes furosemide and another diuretic.  Orthostatics done here.  Negative, he had labs drawn and his sodium was 129 and he had a slight bump in his creatinine, he was given IV fluid hydration and patient stated he felt fine, he is neurologically intact hemodynamically stable he is afebrile in no acute distress was not able to reproduce hypotension or observe any episodes of hypotension or dizziness here, patient would like to be discharged home chest x-ray appears unremarkable and there does not appear to be any acute ischemic changes and ECG.  Patient will follow-up with his primary care provider and advised him to return to  the ED immediately if symptoms recur.        Procedure  Procedures     Jose Hernandez PA-C  09/18/24 1714       Jose Hernandez PA-C  09/19/24 6423       Jose Hernandez PA-C  09/19/24 8080

## 2024-09-19 LAB
ATRIAL RATE: 81 BPM
P AXIS: 49 DEGREES
P OFFSET: 195 MS
P ONSET: 138 MS
PR INTERVAL: 176 MS
Q ONSET: 226 MS
QRS COUNT: 14 BEATS
QRS DURATION: 96 MS
QT INTERVAL: 346 MS
QTC CALCULATION(BAZETT): 401 MS
QTC FREDERICIA: 382 MS
R AXIS: -29 DEGREES
T AXIS: 41 DEGREES
T OFFSET: 399 MS
VENTRICULAR RATE: 81 BPM

## 2024-09-24 DIAGNOSIS — I63.9 ISCHEMIC STROKE (MULTI): Primary | ICD-10-CM

## 2024-09-24 DIAGNOSIS — I50.42 CHRONIC COMBINED SYSTOLIC AND DIASTOLIC HEART FAILURE: ICD-10-CM

## 2024-09-27 ENCOUNTER — APPOINTMENT (OUTPATIENT)
Dept: PHARMACY | Facility: HOSPITAL | Age: 89
End: 2024-09-27
Payer: MEDICARE

## 2024-09-27 ENCOUNTER — TELEMEDICINE (OUTPATIENT)
Dept: PHARMACY | Facility: HOSPITAL | Age: 89
End: 2024-09-27
Payer: MEDICARE

## 2024-09-27 DIAGNOSIS — I50.42 CHRONIC COMBINED SYSTOLIC AND DIASTOLIC HEART FAILURE: ICD-10-CM

## 2024-09-27 DIAGNOSIS — I63.9 ISCHEMIC STROKE (MULTI): ICD-10-CM

## 2024-09-27 NOTE — ASSESSMENT & PLAN NOTE
Patient has Stage B Class I HFpEF with most recent EF 55-60%. Patient is on complete GDMT.     Rationale for plan: Patient currently not exhibiting symptoms of CHF. Is on diuretic, MRA and beta blocker. Had long discussion with daughter about benefits and side effects of each. Discussed that combination of Furosemide and Eplerenone should counteract any effects on potassium.     Daughter concerned about patient's low BP and dizzy spells. Moved Metoprolol administration to nighttime which has helped with systolic readings, however diastolic readings are still on the lower end (reported as 50-60). HR within normal range in the 70s. Advised to continue to monitor and discuss with providers if trending downwards.    Medication Changes: None    CONTINUE:  Eplerenone 25 mg daily  Furosemide 40 mg daily  Metoprolol Succinate XL 12.5 mg daily (taking 0.5 tabs of 25 mg strength)    Monitoring and Education:  Weigh yourself without clothing daily after using the bathroom first thing in the morning before breakfast   Contact your physician/seek help immediately if you notice the following with symptoms of shortness of breath or swelling in your extremities:   Weight gain of 3+ lbs overnight   Weight gain of 5+ lbs in a week   Physical limitations to your normal physical activity level   Limit fluid intake as instructed by your doctor and follow a heart friendly diet low in salt, fat, and focused in lean meats   Aim to exercise for 30 minutes anywhere between 3 to 5 times a week or more, depending on your physical limitations   Keep a log of your daily BP, HR and weight to share with providers

## 2024-09-27 NOTE — ASSESSMENT & PLAN NOTE
Patient has history of ischemic stroke in 2018. Patient currently taking ASA 81 mg and high intensity statin daily.     Rationale for plan: Discussed benefits of both baby aspirin and statin therapy as secondary prevention. Patient also currently on Eliquis for 30 days as well due to PE during hospitalization which can increase bleed risk.     Medication Changes: None    CONTINUE:  Aspirin 81 mg daily  Atorvastatin 40 mg daily    Monitoring and Education:  -Counseled patient of side effects that are indicative of bleeding such as dark tarry stool, unexplainable bruising, or vomiting up a coffee ground like substance

## 2024-09-27 NOTE — PROGRESS NOTES
Pharmacy Post-Discharge Visit  Radhames Ken is a 89 y.o. male was referred to Clinical Pharmacy Team to complete a post-discharge medication optimization and monitoring visit.  The patient was referred for their Stroke and Congestive Heart Failure.    Admission Date: 9/11/24  Discharge Date: 9/16/24    Referring Provider: Keily Sage DO  Last Visit: 5/22/24  Next Visit: not scheduled    Specialists: Pulmonologist (Dr. Gatica - last visit 8/22/24)    Subjective   No Known Allergies    [id number] - [facility] [place] - [address]  [address] [address]  Phone: [phone] Fax: [phone]    [facility]  [address] [address]  Phone: [phone] Fax: [phone]    [id number] - [place], [place] - [address]  [address]  [facility] [address]  Phone: [phone] Fax: [phone]      Social History     Social History Narrative    Not on file      Notable Medication changes following discharge:  Start: Cefuroxime 500 mg BID x6d,  Eliquis 5 mg BID x30d, Prednisone 40 mg x3d   Stop: N/A  Change: N/A    Had long discussion with patient's daughter,Wiliam, about indications of medications, MOA and benefit vs. risk of each.     CHF ASSESSMENT  Symptom/Staging:  -Most recent ejection fraction: 55-60% (9/2024)  -Weight changes?: No    Guideline-Directed Medical Therapy:  -ARNI: No   -If no, then ACEi/ARB?: No  -Beta Blocker: Yes, describe: Metoprolol Succinate XL 12.5 mg daily  -MRA: Yes, describe: Eplerenone 25 mg daily  -SGLT2i: No    Secondary Prevention:  -The ASCVD Risk score (Jazmin CHAUDHARI, et al., 2019) failed to calculate for the following reasons:    The 2019 ASCVD risk score is only valid for ages 40 to 79    The patient has a prior MI or stroke diagnosis   -Aspirin 81mg? yes  -Statin?: Yes, describe: Atorvastatin 40 mg daily  -HTN?: Yes, describe: well controlled, 130/82 mmHg on 9/18/24 , diastolic in 50-60s since discharge    STROKE  "ASSESSMENT  Stroke Regimen:  -Cause of stroke: Cardioembolic (2018)  -Stroke Meds:   -Anticoagulant: Yes, describe: Eliquis 5 mg BID    -Antiplatelet: Yes, describe: ASA 81 mg daily    -Duration of therapy: Eliquis for 30 days d/t PE, end date 10/14/24    -The ASCVD Risk score (Jazmin CHAUDHARI, et al., 2019) failed to calculate for the following reasons:    The 2019 ASCVD risk score is only valid for ages 40 to 79    The patient has a prior MI or stroke diagnosis     HTN:  -HTN diagnosis: yes  -There were no vitals filed for this visit.   -Current Regimen   -Eplerenone 25 mg daily   -Furosemide 40 mg daily   -Metoprolol Succinate XL 12.5 mg daily  -BP Cuff at home? yes  -HTN at goal? yes    HLD:  -Current LDL: 18  -Current T  -Current Regimen   -Atorvastatin 40 mg daily  -HLD at goal? yes    Objective     There were no vitals taken for this visit.     LAB  Lab Results   Component Value Date    BILITOT 0.9 2024    CALCIUM 8.4 (L) 2024    CO2 25 2024    CL 98 2024    CREATININE 1.33 (H) 2024    GLUCOSE 97 2024    ALKPHOS 139 (H) 2024    K 4.8 2024    PROT 5.7 (L) 2024     (L) 2024    AST 34 2024    ALT 45 2024    BUN 36 (H) 2024    ANIONGAP 11 2024    MG 2.37 2024    PHOS 3.1 2018    ALBUMIN 3.6 2024    LIPASE 10 2018    EGFR 51 (L) 2024     Lab Results   Component Value Date    TRIG 111 2022    CHOL 120 2022    HDL 34.9 (A) 2022     No results found for: \"HGBA1C\"      Current Outpatient Medications on File Prior to Visit   Medication Sig Dispense Refill    albuterol (Ventolin HFA) 90 mcg/actuation inhaler Inhale 2 puffs every 4 hours if needed for wheezing or shortness of breath. 18 g 11    apixaban (Eliquis) 5 mg tablet Take 2 tablets (10 mg) by mouth 2 times a day for 4 days, THEN 1 tablet (5 mg) 2 times a day there after. 76 tablet 0    aspirin (Aspirin Childrens) 81 mg chewable " tablet Chew 1 tablet (81 mg) once daily.      atorvastatin (Lipitor) 40 mg tablet Take 1 tablet (40 mg) by mouth once daily.      eplerenone (Inspra) 25 mg tablet Take 1 tablet (25 mg) by mouth once daily.      fluticasone furoate (Arnuity Ellipta) 100 mcg/actuation inhaler Inhale 1 puff once daily. 30 each 3    furosemide (Lasix) 40 mg tablet Take 1 tablet (40 mg) by mouth once daily.      guaiFENesin (Mucinex) 600 mg 12 hr tablet Take 1 tablet (600 mg) by mouth 2 times a day. Do not crush, chew, or split. 60 tablet 11    ipratropium-albuteroL (Duo-Neb) 0.5-2.5 mg/3 mL nebulizer solution Take 3 mL by nebulization 2 times a day as needed for wheezing or shortness of breath.      metoprolol succinate XL (Toprol-XL) 25 mg 24 hr tablet Take 0.5 tablets (12.5 mg) by mouth once daily.       No current facility-administered medications on file prior to visit.      Assessment/Plan   Problem List Items Addressed This Visit       Ischemic stroke (Multi)     Patient has history of ischemic stroke in 2018. Patient currently taking ASA 81 mg and high intensity statin daily.     Rationale for plan: Discussed benefits of both baby aspirin and statin therapy as secondary prevention. Patient also currently on Eliquis for 30 days as well due to PE during hospitalization which can increase bleed risk.     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 Patient is on complete GDMT.     Rationale for plan: Patient currently not exhibiting symptoms of CHF. Is on diuretic, MRA and beta blocker. Had long discussion with daughter about benefits and side effects of each. Discussed  that combination of Furosemide and Eplerenone should counteract any effects on potassium.     Daughter concerned about patient's low BP and dizzy spells. Moved Metoprolol administration to nighttime which has helped with systolic readings, however diastolic readings are still on the lower end (reported as 50-60). HR within normal range in the 70s. Advised to continue to monitor and discuss with providers if trending downwards.    Medication Changes: None    CONTINUE:  Eplerenone 25 mg daily  Furosemide 40 mg daily  Metoprolol Succinate XL 12.5 mg daily (taking 0.5 tabs of 25 mg strength)    Monitoring and Education:  Weigh yourself without clothing daily after using the bathroom first thing in the morning before breakfast   Contact your physician/seek help immediately if you notice the following with symptoms of shortness of breath or swelling in your extremities:   Weight gain of 3+ lbs overnight   Weight gain of 5+ lbs in a week   Physical limitations to your normal physical activity level   Limit fluid intake as instructed by your doctor and follow a heart friendly diet low in salt, fat, and focused in lean meats   Aim to exercise for 30 minutes anywhere between 3 to 5 times a week or more, depending on your physical limitations   Keep a log of your daily BP, HR and weight to share with providers           Clinical Pharmacist follow up: As needed based on clinical intervention  Type of Encounter:  Telephone    Lynn Crandall PharmD  Clinical Ambulatory Care Pharmacist  Ph: [phone]    Verbal consent to manage patient's drug therapy was obtained from the patient. They were informed they may decline to participate or withdraw from participation in pharmacy services at any time.

## 2024-10-04 ENCOUNTER — PATIENT OUTREACH (OUTPATIENT)
Dept: CARE COORDINATION | Facility: CLINIC | Age: 89
End: 2024-10-04
Payer: MEDICARE

## 2024-10-04 NOTE — PROGRESS NOTES
Outreach call to Wiliam to check in 2 weeks after hospital discharge to support smooth transition of care. Radhames is doing much better overall. His blood pressure is now stable with adjusting the time for the Metoprolol and Furosemide. Patient to follow up with PCP regarding management of Eliquis. Patient was washing his arm and got a skin tear. Wiliam questioned if he should stop the Eliquis. CM advised not stop taking Eliquis until speaking with PCP. CM recommended covering the area with a non-stick guaze and secure with paper tape or wrap with a kerlix and hold with paper tape. For areas that are bleeding CM recommended holding pressure until the bleeding stops. Discussed plan of care. Wiliam with no additional questions at this time.  Will continue to follow.      .mys

## 2024-10-09 ENCOUNTER — PHARMACY VISIT (OUTPATIENT)
Dept: PHARMACY | Facility: CLINIC | Age: 89
End: 2024-10-09
Payer: COMMERCIAL

## 2024-10-09 DIAGNOSIS — I26.99 ACUTE PULMONARY EMBOLISM, UNSPECIFIED PULMONARY EMBOLISM TYPE, UNSPECIFIED WHETHER ACUTE COR PULMONALE PRESENT (MULTI): ICD-10-CM

## 2024-10-09 PROCEDURE — RXMED WILLOW AMBULATORY MEDICATION CHARGE

## 2024-10-14 ENCOUNTER — APPOINTMENT (OUTPATIENT)
Dept: PRIMARY CARE | Facility: CLINIC | Age: 89
End: 2024-10-14
Payer: MEDICARE

## 2024-10-14 VITALS
TEMPERATURE: 97.9 F | WEIGHT: 167 LBS | OXYGEN SATURATION: 99 % | DIASTOLIC BLOOD PRESSURE: 68 MMHG | HEART RATE: 80 BPM | SYSTOLIC BLOOD PRESSURE: 112 MMHG | BODY MASS INDEX: 23.29 KG/M2

## 2024-10-14 DIAGNOSIS — I50.9 CONGESTIVE HEART FAILURE, UNSPECIFIED HF CHRONICITY, UNSPECIFIED HEART FAILURE TYPE: Primary | ICD-10-CM

## 2024-10-14 DIAGNOSIS — I26.09 ACUTE PULMONARY EMBOLISM WITH ACUTE COR PULMONALE, UNSPECIFIED PULMONARY EMBOLISM TYPE (MULTI): ICD-10-CM

## 2024-10-14 PROCEDURE — 3074F SYST BP LT 130 MM HG: CPT | Performed by: FAMILY MEDICINE

## 2024-10-14 PROCEDURE — 3078F DIAST BP <80 MM HG: CPT | Performed by: FAMILY MEDICINE

## 2024-10-14 PROCEDURE — 99214 OFFICE O/P EST MOD 30 MIN: CPT | Performed by: FAMILY MEDICINE

## 2024-10-14 PROCEDURE — 1159F MED LIST DOCD IN RCRD: CPT | Performed by: FAMILY MEDICINE

## 2024-10-14 PROCEDURE — 1111F DSCHRG MED/CURRENT MED MERGE: CPT | Performed by: FAMILY MEDICINE

## 2024-10-14 PROCEDURE — 1036F TOBACCO NON-USER: CPT | Performed by: FAMILY MEDICINE

## 2024-10-14 RX ORDER — LISINOPRIL 10 MG/1
1 TABLET ORAL
COMMUNITY
Start: 2024-09-14

## 2024-10-14 ASSESSMENT — ENCOUNTER SYMPTOMS
NUMBNESS: 0
COUGH: 1
UNEXPECTED WEIGHT CHANGE: 0
APPETITE CHANGE: 0
DYSURIA: 0
RHINORRHEA: 0
HEMATURIA: 0
SINUS PRESSURE: 0
DIZZINESS: 0
EYE DISCHARGE: 0
EYE ITCHING: 0
ACTIVITY CHANGE: 0
SORE THROAT: 0
DIARRHEA: 0
VOMITING: 0
DYSPHORIC MOOD: 0
BLOOD IN STOOL: 0
SLEEP DISTURBANCE: 0
LIGHT-HEADEDNESS: 0
WEAKNESS: 0
FEVER: 0
HEADACHES: 0
NAUSEA: 0
MYALGIAS: 0
JOINT SWELLING: 0
FLANK PAIN: 0
WHEEZING: 0
SHORTNESS OF BREATH: 0
CONSTIPATION: 0
NERVOUS/ANXIOUS: 0
PALPITATIONS: 0
ABDOMINAL PAIN: 0
ARTHRALGIAS: 0

## 2024-10-14 NOTE — PROGRESS NOTES
Subjective   Patient ID: Radhames Ken is a 89 y.o. male who presents for Hospital Follow-up ([facility]- LOW BP AND RESP CONCERNS- WAS DIAGNOSED WITH PULM EMBOLI. NO CURRENT CONCERNS. COUGH IN THE MORNING ONLY X 2 DAYS.).    HPI HAD SMALL EMBOLI   FOLLOW WITH CARDIOLOGIST     Review of Systems   Constitutional:  Negative for activity change, appetite change, fever and unexpected weight change.   HENT:  Negative for congestion, ear pain, postnasal drip, rhinorrhea, sinus pressure and sore throat.    Eyes:  Negative for discharge, itching and visual disturbance.   Respiratory:  Positive for cough. Negative for shortness of breath and wheezing.         HAD SMALL EMBOLI    Cardiovascular:  Negative for chest pain, palpitations and leg swelling.   Gastrointestinal:  Negative for abdominal pain, blood in stool, constipation, diarrhea, nausea and vomiting.   Endocrine: Negative for cold intolerance, heat intolerance and polyuria.   Genitourinary:  Negative for dysuria, flank pain and hematuria.   Musculoskeletal:  Negative for arthralgias, gait problem, joint swelling and myalgias.   Skin:  Negative for rash.   Allergic/Immunologic: Negative for environmental allergies and food allergies.   Neurological:  Negative for dizziness, syncope, weakness, light-headedness, numbness and headaches.   Psychiatric/Behavioral:  Negative for dysphoric mood and sleep disturbance. The patient is not nervous/anxious.        Objective   /68   Pulse 80   Temp 36.6 °C (97.9 °F)   Wt 75.8 kg (167 lb)   SpO2 99%   BMI 23.29 kg/m²     Physical Exam  Vitals and nursing note reviewed.   Constitutional:       Appearance: Normal appearance.   HENT:      Head: Normocephalic.   Cardiovascular:      Rate and Rhythm: Normal rate and regular rhythm.      Pulses: Normal pulses.      Heart sounds: Normal heart sounds. No murmur heard.     No friction rub. No gallop.   Pulmonary:      Effort: Pulmonary effort is normal. No respiratory distress.       Breath sounds: Normal breath sounds. No wheezing.   Abdominal:      General: Bowel sounds are normal. There is no distension.      Palpations: Abdomen is soft.      Tenderness: There is no abdominal tenderness.   Musculoskeletal:         General: No deformity. Normal range of motion.   Skin:     General: Skin is warm and dry.      Capillary Refill: Capillary refill takes less than 2 seconds.   Neurological:      General: No focal deficit present.      Mental Status: He is alert and oriented to person, place, and time.   Psychiatric:         Mood and Affect: Mood normal.       Assessment/Plan   Problem List Items Addressed This Visit             ICD-10-CM    Congestive heart failure - Primary I50.9    Acute pulmonary embolism (Multi) I26.99

## 2024-10-17 ENCOUNTER — PATIENT OUTREACH (OUTPATIENT)
Dept: CARE COORDINATION | Facility: CLINIC | Age: 89
End: 2024-10-17
Payer: MEDICARE

## 2024-10-17 NOTE — PROGRESS NOTES
Outreach call to Wiliam to check in 30 days after hospital discharge to support smooth transition of care.  Left voicemail message with CM name and contact number.No additional outreach needed at this time.     Rachel Lopez RN/CM  [facility]  [phone]

## 2024-10-18 ENCOUNTER — DOCUMENTATION (OUTPATIENT)
Dept: CARE COORDINATION | Facility: CLINIC | Age: 89
End: 2024-10-18
Payer: MEDICARE

## 2024-10-18 NOTE — PROGRESS NOTES
10/17/2024 @ 12:01  Wiliam returned CM outreach call  She states, there is no reason to call back  Radhames is doing pretty well. There are no concerns at this time. Just has a recent appointment with PCP that went well. She states, hopefully they are on a good track for this year.  ----------------------  CM will close case  Patient with no needs.    Rachel Lopez RN/CM  [facility]  [phone]

## 2024-10-25 ENCOUNTER — PATIENT MESSAGE (OUTPATIENT)
Dept: PRIMARY CARE | Facility: CLINIC | Age: 89
End: 2024-10-25
Payer: MEDICARE

## 2024-10-25 DIAGNOSIS — J18.9 PNEUMONIA OF BOTH LOWER LOBES DUE TO INFECTIOUS ORGANISM: ICD-10-CM

## 2024-10-25 RX ORDER — IPRATROPIUM BROMIDE AND ALBUTEROL SULFATE 2.5; .5 MG/3ML; MG/3ML
3 SOLUTION RESPIRATORY (INHALATION) 2 TIMES DAILY PRN
Qty: 180 ML | Refills: 0 | Status: SHIPPED | OUTPATIENT
Start: 2024-10-25

## 2024-11-05 ENCOUNTER — HOSPITAL ENCOUNTER (INPATIENT)
Facility: HOSPITAL | Age: 89
LOS: 4 days | Discharge: HOME | End: 2024-11-09
Attending: EMERGENCY MEDICINE | Admitting: INTERNAL MEDICINE
Payer: MEDICARE

## 2024-11-05 ENCOUNTER — APPOINTMENT (OUTPATIENT)
Dept: RADIOLOGY | Facility: HOSPITAL | Age: 89
End: 2024-11-05
Payer: MEDICARE

## 2024-11-05 DIAGNOSIS — R09.02 HYPOXIA: ICD-10-CM

## 2024-11-05 DIAGNOSIS — J44.1 COPD EXACERBATION (MULTI): ICD-10-CM

## 2024-11-05 DIAGNOSIS — I50.9 ACUTE ON CHRONIC CONGESTIVE HEART FAILURE, UNSPECIFIED HEART FAILURE TYPE: Primary | ICD-10-CM

## 2024-11-05 DIAGNOSIS — I50.42 CHRONIC COMBINED SYSTOLIC AND DIASTOLIC HEART FAILURE: ICD-10-CM

## 2024-11-05 PROBLEM — R05.9 COUGH: Status: ACTIVE | Noted: 2024-11-05

## 2024-11-05 PROBLEM — Z86.711 HISTORY OF PULMONARY EMBOLISM: Status: ACTIVE | Noted: 2024-11-05

## 2024-11-05 PROBLEM — I73.9 PERIPHERAL VASCULAR DISEASE, UNSPECIFIED (CMS-HCC): Status: ACTIVE | Noted: 2024-11-05

## 2024-11-05 PROBLEM — N18.30 CHRONIC KIDNEY DISEASE, STAGE 3 UNSPECIFIED (MULTI): Status: ACTIVE | Noted: 2024-11-05

## 2024-11-05 PROBLEM — I50.23 ACUTE ON CHRONIC SYSTOLIC CONGESTIVE HEART FAILURE: Status: ACTIVE | Noted: 2024-11-05

## 2024-11-05 PROBLEM — J42 UNSPECIFIED CHRONIC BRONCHITIS (MULTI): Status: ACTIVE | Noted: 2024-11-05

## 2024-11-05 LAB
ALBUMIN SERPL BCP-MCNC: 3.5 G/DL (ref 3.4–5)
ALP SERPL-CCNC: 129 U/L (ref 33–136)
ALT SERPL W P-5'-P-CCNC: 15 U/L (ref 10–52)
ANION GAP SERPL CALC-SCNC: 10 MMOL/L (ref 10–20)
APPEARANCE UR: CLEAR
AST SERPL W P-5'-P-CCNC: 18 U/L (ref 9–39)
BASOPHILS # BLD AUTO: 0.08 X10*3/UL (ref 0–0.1)
BASOPHILS NFR BLD AUTO: 1 %
BILIRUB SERPL-MCNC: 1.7 MG/DL (ref 0–1.2)
BILIRUB UR STRIP.AUTO-MCNC: NEGATIVE MG/DL
BNP SERPL-MCNC: 262 PG/ML (ref 0–99)
BUN SERPL-MCNC: 25 MG/DL (ref 6–23)
CALCIUM SERPL-MCNC: 8.9 MG/DL (ref 8.6–10.3)
CHLORIDE SERPL-SCNC: 99 MMOL/L (ref 98–107)
CO2 SERPL-SCNC: 26 MMOL/L (ref 21–32)
COLOR UR: ABNORMAL
CREAT SERPL-MCNC: 1.25 MG/DL (ref 0.5–1.3)
EGFRCR SERPLBLD CKD-EPI 2021: 55 ML/MIN/1.73M*2
EOSINOPHIL # BLD AUTO: 0.67 X10*3/UL (ref 0–0.4)
EOSINOPHIL NFR BLD AUTO: 8.6 %
ERYTHROCYTE [DISTWIDTH] IN BLOOD BY AUTOMATED COUNT: 13.9 % (ref 11.5–14.5)
FLUAV RNA RESP QL NAA+PROBE: NOT DETECTED
FLUBV RNA RESP QL NAA+PROBE: NOT DETECTED
GLUCOSE SERPL-MCNC: 130 MG/DL (ref 74–99)
GLUCOSE UR STRIP.AUTO-MCNC: NORMAL MG/DL
HCT VFR BLD AUTO: 37 % (ref 41–52)
HGB BLD-MCNC: 12.5 G/DL (ref 13.5–17.5)
IMM GRANULOCYTES # BLD AUTO: 0.02 X10*3/UL (ref 0–0.5)
IMM GRANULOCYTES NFR BLD AUTO: 0.3 % (ref 0–0.9)
KETONES UR STRIP.AUTO-MCNC: NEGATIVE MG/DL
LACTATE SERPL-SCNC: 1.8 MMOL/L (ref 0.4–2)
LEUKOCYTE ESTERASE UR QL STRIP.AUTO: NEGATIVE
LYMPHOCYTES # BLD AUTO: 0.86 X10*3/UL (ref 0.8–3)
LYMPHOCYTES NFR BLD AUTO: 11 %
MCH RBC QN AUTO: 30.6 PG (ref 26–34)
MCHC RBC AUTO-ENTMCNC: 33.8 G/DL (ref 32–36)
MCV RBC AUTO: 91 FL (ref 80–100)
MONOCYTES # BLD AUTO: 0.52 X10*3/UL (ref 0.05–0.8)
MONOCYTES NFR BLD AUTO: 6.7 %
MUCOUS THREADS #/AREA URNS AUTO: ABNORMAL /LPF
NEUTROPHILS # BLD AUTO: 5.65 X10*3/UL (ref 1.6–5.5)
NEUTROPHILS NFR BLD AUTO: 72.4 %
NITRITE UR QL STRIP.AUTO: NEGATIVE
NRBC BLD-RTO: 0 /100 WBCS (ref 0–0)
PH UR STRIP.AUTO: 6.5 [PH]
PLATELET # BLD AUTO: 122 X10*3/UL (ref 150–450)
POTASSIUM SERPL-SCNC: 4.2 MMOL/L (ref 3.5–5.3)
PROT SERPL-MCNC: 5.4 G/DL (ref 6.4–8.2)
PROT UR STRIP.AUTO-MCNC: NEGATIVE MG/DL
RBC # BLD AUTO: 4.09 X10*6/UL (ref 4.5–5.9)
RBC # UR STRIP.AUTO: ABNORMAL /UL
RBC #/AREA URNS AUTO: ABNORMAL /HPF
SARS-COV-2 RNA RESP QL NAA+PROBE: NOT DETECTED
SODIUM SERPL-SCNC: 131 MMOL/L (ref 136–145)
SP GR UR STRIP.AUTO: 1.01
UROBILINOGEN UR STRIP.AUTO-MCNC: NORMAL MG/DL
WBC # BLD AUTO: 7.8 X10*3/UL (ref 4.4–11.3)
WBC #/AREA URNS AUTO: ABNORMAL /HPF

## 2024-11-05 PROCEDURE — 83605 ASSAY OF LACTIC ACID: CPT | Performed by: EMERGENCY MEDICINE

## 2024-11-05 PROCEDURE — 71275 CT ANGIOGRAPHY CHEST: CPT | Performed by: STUDENT IN AN ORGANIZED HEALTH CARE EDUCATION/TRAINING PROGRAM

## 2024-11-05 PROCEDURE — 81001 URINALYSIS AUTO W/SCOPE: CPT | Performed by: EMERGENCY MEDICINE

## 2024-11-05 PROCEDURE — 99285 EMERGENCY DEPT VISIT HI MDM: CPT | Mod: 25

## 2024-11-05 PROCEDURE — 85025 COMPLETE CBC W/AUTO DIFF WBC: CPT | Performed by: EMERGENCY MEDICINE

## 2024-11-05 PROCEDURE — 36415 COLL VENOUS BLD VENIPUNCTURE: CPT | Performed by: EMERGENCY MEDICINE

## 2024-11-05 PROCEDURE — 80053 COMPREHEN METABOLIC PANEL: CPT | Performed by: EMERGENCY MEDICINE

## 2024-11-05 PROCEDURE — 94664 DEMO&/EVAL PT USE INHALER: CPT

## 2024-11-05 PROCEDURE — 2500000004 HC RX 250 GENERAL PHARMACY W/ HCPCS (ALT 636 FOR OP/ED): Performed by: EMERGENCY MEDICINE

## 2024-11-05 PROCEDURE — 2500000002 HC RX 250 W HCPCS SELF ADMINISTERED DRUGS (ALT 637 FOR MEDICARE OP, ALT 636 FOR OP/ED): Performed by: EMERGENCY MEDICINE

## 2024-11-05 PROCEDURE — 83880 ASSAY OF NATRIURETIC PEPTIDE: CPT | Performed by: EMERGENCY MEDICINE

## 2024-11-05 PROCEDURE — 99223 1ST HOSP IP/OBS HIGH 75: CPT

## 2024-11-05 PROCEDURE — 87040 BLOOD CULTURE FOR BACTERIA: CPT | Mod: GENLAB | Performed by: EMERGENCY MEDICINE

## 2024-11-05 PROCEDURE — 2500000005 HC RX 250 GENERAL PHARMACY W/O HCPCS: Mod: IPSPLIT

## 2024-11-05 PROCEDURE — 96374 THER/PROPH/DIAG INJ IV PUSH: CPT | Mod: 59

## 2024-11-05 PROCEDURE — 1200000002 HC GENERAL ROOM WITH TELEMETRY DAILY: Mod: IPSPLIT

## 2024-11-05 PROCEDURE — 2500000002 HC RX 250 W HCPCS SELF ADMINISTERED DRUGS (ALT 637 FOR MEDICARE OP, ALT 636 FOR OP/ED): Mod: IPSPLIT

## 2024-11-05 PROCEDURE — 2550000001 HC RX 255 CONTRASTS: Performed by: EMERGENCY MEDICINE

## 2024-11-05 PROCEDURE — 71275 CT ANGIOGRAPHY CHEST: CPT

## 2024-11-05 PROCEDURE — 87636 SARSCOV2 & INF A&B AMP PRB: CPT | Performed by: EMERGENCY MEDICINE

## 2024-11-05 PROCEDURE — 87077 CULTURE AEROBIC IDENTIFY: CPT | Mod: GENLAB

## 2024-11-05 PROCEDURE — 94760 N-INVAS EAR/PLS OXIMETRY 1: CPT | Mod: IPSPLIT

## 2024-11-05 PROCEDURE — 2500000004 HC RX 250 GENERAL PHARMACY W/ HCPCS (ALT 636 FOR OP/ED): Mod: IPSPLIT

## 2024-11-05 PROCEDURE — 94640 AIRWAY INHALATION TREATMENT: CPT

## 2024-11-05 PROCEDURE — 94760 N-INVAS EAR/PLS OXIMETRY 1: CPT

## 2024-11-05 PROCEDURE — 94640 AIRWAY INHALATION TREATMENT: CPT | Mod: IPSPLIT

## 2024-11-05 PROCEDURE — 2500000001 HC RX 250 WO HCPCS SELF ADMINISTERED DRUGS (ALT 637 FOR MEDICARE OP): Mod: IPSPLIT

## 2024-11-05 PROCEDURE — 9420000001 HC RT PATIENT EDUCATION 5 MIN

## 2024-11-05 RX ORDER — AZITHROMYCIN 250 MG/1
500 TABLET, FILM COATED ORAL
Status: DISCONTINUED | OUTPATIENT
Start: 2024-11-05 | End: 2024-11-07

## 2024-11-05 RX ORDER — ONDANSETRON 4 MG/1
4 TABLET, FILM COATED ORAL EVERY 8 HOURS PRN
Status: DISCONTINUED | OUTPATIENT
Start: 2024-11-05 | End: 2024-11-07

## 2024-11-05 RX ORDER — IPRATROPIUM BROMIDE AND ALBUTEROL SULFATE 2.5; .5 MG/3ML; MG/3ML
3 SOLUTION RESPIRATORY (INHALATION) ONCE
Status: COMPLETED | OUTPATIENT
Start: 2024-11-05 | End: 2024-11-05

## 2024-11-05 RX ORDER — IPRATROPIUM BROMIDE AND ALBUTEROL SULFATE 2.5; .5 MG/3ML; MG/3ML
3 SOLUTION RESPIRATORY (INHALATION)
Status: DISCONTINUED | OUTPATIENT
Start: 2024-11-05 | End: 2024-11-05

## 2024-11-05 RX ORDER — ALBUTEROL SULFATE 0.83 MG/ML
2.5 SOLUTION RESPIRATORY (INHALATION) EVERY 2 HOUR PRN
Status: DISCONTINUED | OUTPATIENT
Start: 2024-11-05 | End: 2024-11-09 | Stop reason: HOSPADM

## 2024-11-05 RX ORDER — ATORVASTATIN CALCIUM 40 MG/1
40 TABLET, FILM COATED ORAL NIGHTLY
Status: DISCONTINUED | OUTPATIENT
Start: 2024-11-05 | End: 2024-11-09 | Stop reason: HOSPADM

## 2024-11-05 RX ORDER — BUMETANIDE 0.25 MG/ML
1 INJECTION, SOLUTION INTRAMUSCULAR; INTRAVENOUS ONCE
Status: COMPLETED | OUTPATIENT
Start: 2024-11-05 | End: 2024-11-05

## 2024-11-05 RX ORDER — GUAIFENESIN 600 MG/1
1200 TABLET, EXTENDED RELEASE ORAL 2 TIMES DAILY
Status: DISCONTINUED | OUTPATIENT
Start: 2024-11-05 | End: 2024-11-09 | Stop reason: HOSPADM

## 2024-11-05 RX ORDER — METOPROLOL SUCCINATE 25 MG/1
12.5 TABLET, EXTENDED RELEASE ORAL DAILY
Status: DISCONTINUED | OUTPATIENT
Start: 2024-11-06 | End: 2024-11-09 | Stop reason: HOSPADM

## 2024-11-05 RX ORDER — FUROSEMIDE 40 MG/1
40 TABLET ORAL DAILY
Status: DISCONTINUED | OUTPATIENT
Start: 2024-11-06 | End: 2024-11-09 | Stop reason: HOSPADM

## 2024-11-05 RX ORDER — ACETAMINOPHEN 325 MG/1
650 TABLET ORAL EVERY 4 HOURS PRN
Status: DISCONTINUED | OUTPATIENT
Start: 2024-11-05 | End: 2024-11-09 | Stop reason: HOSPADM

## 2024-11-05 RX ORDER — IPRATROPIUM BROMIDE AND ALBUTEROL SULFATE 2.5; .5 MG/3ML; MG/3ML
3 SOLUTION RESPIRATORY (INHALATION) 4 TIMES DAILY
Status: DISCONTINUED | OUTPATIENT
Start: 2024-11-06 | End: 2024-11-08

## 2024-11-05 RX ORDER — ACETAMINOPHEN 650 MG/1
650 SUPPOSITORY RECTAL EVERY 4 HOURS PRN
Status: DISCONTINUED | OUTPATIENT
Start: 2024-11-05 | End: 2024-11-06

## 2024-11-05 RX ORDER — ACETAMINOPHEN 160 MG/5ML
650 SOLUTION ORAL EVERY 4 HOURS PRN
Status: DISCONTINUED | OUTPATIENT
Start: 2024-11-05 | End: 2024-11-06

## 2024-11-05 RX ORDER — POLYETHYLENE GLYCOL 3350 17 G/17G
17 POWDER, FOR SOLUTION ORAL DAILY PRN
Status: DISCONTINUED | OUTPATIENT
Start: 2024-11-05 | End: 2024-11-09 | Stop reason: HOSPADM

## 2024-11-05 RX ORDER — ONDANSETRON HYDROCHLORIDE 2 MG/ML
4 INJECTION, SOLUTION INTRAVENOUS EVERY 8 HOURS PRN
Status: DISCONTINUED | OUTPATIENT
Start: 2024-11-05 | End: 2024-11-09 | Stop reason: HOSPADM

## 2024-11-05 RX ORDER — EPLERENONE 25 MG/1
25 TABLET, FILM COATED ORAL DAILY
Status: DISCONTINUED | OUTPATIENT
Start: 2024-11-06 | End: 2024-11-09 | Stop reason: HOSPADM

## 2024-11-05 RX ORDER — TALC
3 POWDER (GRAM) TOPICAL NIGHTLY PRN
Status: DISCONTINUED | OUTPATIENT
Start: 2024-11-05 | End: 2024-11-09 | Stop reason: HOSPADM

## 2024-11-05 SDOH — SOCIAL STABILITY: SOCIAL INSECURITY: HAS ANYONE EVER THREATENED TO HURT YOUR FAMILY OR YOUR PETS?: NO

## 2024-11-05 SDOH — SOCIAL STABILITY: SOCIAL INSECURITY: WITHIN THE LAST YEAR, HAVE YOU BEEN AFRAID OF YOUR PARTNER OR EX-PARTNER?: NO

## 2024-11-05 SDOH — ECONOMIC STABILITY: INCOME INSECURITY: IN THE PAST 12 MONTHS HAS THE ELECTRIC, GAS, OIL, OR WATER COMPANY THREATENED TO SHUT OFF SERVICES IN YOUR HOME?: NO

## 2024-11-05 SDOH — SOCIAL STABILITY: SOCIAL NETWORK: HOW OFTEN DO YOU GET TOGETHER WITH FRIENDS OR RELATIVES?: MORE THAN THREE TIMES A WEEK

## 2024-11-05 SDOH — SOCIAL STABILITY: SOCIAL INSECURITY: ABUSE: ADULT

## 2024-11-05 SDOH — SOCIAL STABILITY: SOCIAL INSECURITY: ARE YOU OR HAVE YOU BEEN THREATENED OR ABUSED PHYSICALLY, EMOTIONALLY, OR SEXUALLY BY ANYONE?: NO

## 2024-11-05 SDOH — HEALTH STABILITY: MENTAL HEALTH: HOW MANY DRINKS CONTAINING ALCOHOL DO YOU HAVE ON A TYPICAL DAY WHEN YOU ARE DRINKING?: PATIENT DOES NOT DRINK

## 2024-11-05 SDOH — SOCIAL STABILITY: SOCIAL INSECURITY: WITHIN THE LAST YEAR, HAVE YOU BEEN HUMILIATED OR EMOTIONALLY ABUSED IN OTHER WAYS BY YOUR PARTNER OR EX-PARTNER?: NO

## 2024-11-05 SDOH — SOCIAL STABILITY: SOCIAL INSECURITY: ARE THERE ANY APPARENT SIGNS OF INJURIES/BEHAVIORS THAT COULD BE RELATED TO ABUSE/NEGLECT?: NO

## 2024-11-05 SDOH — HEALTH STABILITY: PHYSICAL HEALTH
HOW OFTEN DO YOU NEED TO HAVE SOMEONE HELP YOU WHEN YOU READ INSTRUCTIONS, PAMPHLETS, OR OTHER WRITTEN MATERIAL FROM YOUR DOCTOR OR PHARMACY?: SOMETIMES

## 2024-11-05 SDOH — HEALTH STABILITY: PHYSICAL HEALTH: ON AVERAGE, HOW MANY DAYS PER WEEK DO YOU ENGAGE IN MODERATE TO STRENUOUS EXERCISE (LIKE A BRISK WALK)?: 0 DAYS

## 2024-11-05 SDOH — ECONOMIC STABILITY: HOUSING INSECURITY: AT ANY TIME IN THE PAST 12 MONTHS, WERE YOU HOMELESS OR LIVING IN A SHELTER (INCLUDING NOW)?: NO

## 2024-11-05 SDOH — SOCIAL STABILITY: SOCIAL NETWORK: HOW OFTEN DO YOU ATTEND MEETINGS OF THE CLUBS OR ORGANIZATIONS YOU BELONG TO?: NEVER

## 2024-11-05 SDOH — SOCIAL STABILITY: SOCIAL INSECURITY: ARE YOU MARRIED, WIDOWED, DIVORCED, SEPARATED, NEVER MARRIED, OR LIVING WITH A PARTNER?: MARRIED

## 2024-11-05 SDOH — SOCIAL STABILITY: SOCIAL INSECURITY: DOES ANYONE TRY TO KEEP YOU FROM HAVING/CONTACTING OTHER FRIENDS OR DOING THINGS OUTSIDE YOUR HOME?: NO

## 2024-11-05 SDOH — ECONOMIC STABILITY: HOUSING INSECURITY: IN THE PAST 12 MONTHS, HOW MANY TIMES HAVE YOU MOVED WHERE YOU WERE LIVING?: 0

## 2024-11-05 SDOH — SOCIAL STABILITY: SOCIAL INSECURITY: HAVE YOU HAD ANY THOUGHTS OF HARMING ANYONE ELSE?: NO

## 2024-11-05 SDOH — ECONOMIC STABILITY: TRANSPORTATION INSECURITY: IN THE PAST 12 MONTHS, HAS LACK OF TRANSPORTATION KEPT YOU FROM MEDICAL APPOINTMENTS OR FROM GETTING MEDICATIONS?: NO

## 2024-11-05 SDOH — HEALTH STABILITY: MENTAL HEALTH: HOW OFTEN DO YOU HAVE A DRINK CONTAINING ALCOHOL?: NEVER

## 2024-11-05 SDOH — SOCIAL STABILITY: SOCIAL NETWORK: IN A TYPICAL WEEK, HOW MANY TIMES DO YOU TALK ON THE PHONE WITH FAMILY, FRIENDS, OR NEIGHBORS?: PATIENT UNABLE TO ANSWER

## 2024-11-05 SDOH — ECONOMIC STABILITY: HOUSING INSECURITY: IN THE LAST 12 MONTHS, WAS THERE A TIME WHEN YOU WERE NOT ABLE TO PAY THE MORTGAGE OR RENT ON TIME?: NO

## 2024-11-05 SDOH — SOCIAL STABILITY: SOCIAL NETWORK: HOW OFTEN DO YOU ATTEND CHURCH OR RELIGIOUS SERVICES?: NEVER

## 2024-11-05 SDOH — SOCIAL STABILITY: SOCIAL INSECURITY: WERE YOU ABLE TO COMPLETE ALL THE BEHAVIORAL HEALTH SCREENINGS?: YES

## 2024-11-05 SDOH — SOCIAL STABILITY: SOCIAL INSECURITY: DO YOU FEEL UNSAFE GOING BACK TO THE PLACE WHERE YOU ARE LIVING?: NO

## 2024-11-05 SDOH — SOCIAL STABILITY: SOCIAL INSECURITY: HAVE YOU HAD THOUGHTS OF HARMING ANYONE ELSE?: NO

## 2024-11-05 SDOH — ECONOMIC STABILITY: FOOD INSECURITY: WITHIN THE PAST 12 MONTHS, THE FOOD YOU BOUGHT JUST DIDN'T LAST AND YOU DIDN'T HAVE MONEY TO GET MORE.: NEVER TRUE

## 2024-11-05 SDOH — HEALTH STABILITY: PHYSICAL HEALTH: ON AVERAGE, HOW MANY MINUTES DO YOU ENGAGE IN EXERCISE AT THIS LEVEL?: 0 MIN

## 2024-11-05 SDOH — ECONOMIC STABILITY: FOOD INSECURITY: WITHIN THE PAST 12 MONTHS, YOU WORRIED THAT YOUR FOOD WOULD RUN OUT BEFORE YOU GOT THE MONEY TO BUY MORE.: NEVER TRUE

## 2024-11-05 SDOH — HEALTH STABILITY: MENTAL HEALTH: HOW OFTEN DO YOU HAVE SIX OR MORE DRINKS ON ONE OCCASION?: NEVER

## 2024-11-05 SDOH — ECONOMIC STABILITY: FOOD INSECURITY: HOW HARD IS IT FOR YOU TO PAY FOR THE VERY BASICS LIKE FOOD, HOUSING, MEDICAL CARE, AND HEATING?: NOT HARD AT ALL

## 2024-11-05 SDOH — SOCIAL STABILITY: SOCIAL INSECURITY: DO YOU FEEL ANYONE HAS EXPLOITED OR TAKEN ADVANTAGE OF YOU FINANCIALLY OR OF YOUR PERSONAL PROPERTY?: NO

## 2024-11-05 ASSESSMENT — PAIN - FUNCTIONAL ASSESSMENT
PAIN_FUNCTIONAL_ASSESSMENT: 0-10
PAIN_FUNCTIONAL_ASSESSMENT: 0-10

## 2024-11-05 ASSESSMENT — COGNITIVE AND FUNCTIONAL STATUS - GENERAL
MOBILITY SCORE: 24
PATIENT BASELINE BEDBOUND: NO
DAILY ACTIVITIY SCORE: 24

## 2024-11-05 ASSESSMENT — LIFESTYLE VARIABLES
AUDIT-C TOTAL SCORE: 0
HOW OFTEN DO YOU HAVE A DRINK CONTAINING ALCOHOL: NEVER
SKIP TO QUESTIONS 9-10: 1
HOW MANY STANDARD DRINKS CONTAINING ALCOHOL DO YOU HAVE ON A TYPICAL DAY: PATIENT DOES NOT DRINK
AUDIT-C TOTAL SCORE: 0
SUBSTANCE_ABUSE_PAST_12_MONTHS: NO
HOW OFTEN DO YOU HAVE 6 OR MORE DRINKS ON ONE OCCASION: NEVER
PRESCIPTION_ABUSE_PAST_12_MONTHS: NO
SKIP TO QUESTIONS 9-10: 1
AUDIT-C TOTAL SCORE: 0

## 2024-11-05 ASSESSMENT — ENCOUNTER SYMPTOMS
EYES NEGATIVE: 1
FATIGUE: 1
SHORTNESS OF BREATH: 1
ALLERGIC/IMMUNOLOGIC NEGATIVE: 1
ENDOCRINE NEGATIVE: 1
MUSCULOSKELETAL NEGATIVE: 1
WEAKNESS: 1
CARDIOVASCULAR NEGATIVE: 1
GASTROINTESTINAL NEGATIVE: 1
HEMATOLOGIC/LYMPHATIC NEGATIVE: 1
CONFUSION: 1

## 2024-11-05 ASSESSMENT — ACTIVITIES OF DAILY LIVING (ADL)
WALKS IN HOME: INDEPENDENT
FEEDING YOURSELF: INDEPENDENT
PATIENT'S MEMORY ADEQUATE TO SAFELY COMPLETE DAILY ACTIVITIES?: YES
BATHING: INDEPENDENT
GROOMING: INDEPENDENT
LACK_OF_TRANSPORTATION: NO
DRESSING YOURSELF: INDEPENDENT
ADEQUATE_TO_COMPLETE_ADL: YES
LACK_OF_TRANSPORTATION: NO
JUDGMENT_ADEQUATE_SAFELY_COMPLETE_DAILY_ACTIVITIES: YES
HEARING - LEFT EAR: FUNCTIONAL
TOILETING: INDEPENDENT
HEARING - RIGHT EAR: FUNCTIONAL

## 2024-11-05 ASSESSMENT — PAIN SCALES - GENERAL
PAINLEVEL_OUTOF10: 0 - NO PAIN

## 2024-11-05 NOTE — H&P
History Of Present Illness  Radhames Ken is a 89 y.o. male presenting with with increased SOB, weakness, and cough over past several days.  Patient and wife both poor historians and no other family present at the time of evaluation.  Discussed current treatment plan, states understanding, and agrees.       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    Diarrhea, functional    Heart disease     HL (hearing loss)     Hypertension     Other disorders of plasma-protein metabolism, not elsewhere classified 05/30/2017    Serum albumin decreased    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    Personal history of diseases of the skin and subcutaneous tissue 02/18/2019    History of eczema    Personal history of other diseases of male genital organs     History of BPH    Personal history of other diseases of the circulatory system     History of hypertension    Personal history of other diseases of the circulatory system     History of mitral valve insufficiency    Personal history of other diseases of the musculoskeletal system and connective tissue     Personal history of arthritis    Personal history of other endocrine, nutritional and metabolic disease     History of hyperlipidemia    Personal history of other specified conditions 11/09/2018    History of seizure    Personal history of other specified conditions 05/05/2022    History of vomiting    Stroke (Multi)     Visual impairment        Surgical History  Past Surgical History:   Procedure Laterality Date    CARDIAC SURGERY   04/28/2014    Heart Surgery    CHOLECYSTECTOMY  04/28/2014    Cholecystectomy    GALLBLADDER SURGERY  04/28/2014    Gallbladder Surgery    MR HEAD ANGIO WO IV CONTRAST  9/30/2018    MR HEAD ANGIO WO IV CONTRAST 9/30/2018 [facility]    MR NECK ANGIO WO IV CONTRAST  9/30/2018    MR NECK ANGIO WO IV CONTRAST 9/30/2018 [facility] CLINICAL [facility]GAC[facility]    OTHER SURGICAL HISTORY  04/28/2014    Knee Repair    OTHER SURGICAL HISTORY  04/28/2014    Needle Biopsy Of Prostate    OTHER SURGICAL HISTORY  03/05/2021    Complete colonoscopy    OTHER SURGICAL HISTORY  03/05/2021    Endoscopy    OTHER SURGICAL HISTORY  10/08/2018    Common Bile Duct Stone Removal    PROSTATE SURGERY  10/08/2018    Prostate Surgery        Social History  He reports that he has never smoked. He has never used smokeless tobacco. He reports that he does not currently use alcohol. He reports that he does not use drugs.    Family History  Family History   Problem Relation Name Age of Onset    Heart disease Mother Abiola     Breast cancer Mother Abiola     Lung cancer Father      Cancer Brother Arpit     Cancer Brother Kevin     Cancer Brother Tere     Cancer Brother          Allergies  Patient has no known allergies.    Review of Systems   Constitutional:  Positive for fatigue.   HENT:  Positive for congestion.    Eyes: Negative.    Respiratory:  Positive for shortness of breath.    Cardiovascular: Negative.    Gastrointestinal: Negative.    Endocrine: Negative.    Genitourinary: Negative.    Musculoskeletal: Negative.    Skin: Negative.    Allergic/Immunologic: Negative.    Neurological:  Positive for weakness.   Hematological: Negative.    Psychiatric/Behavioral:  Positive for confusion.         Physical Exam  Constitutional:       Appearance: Normal appearance.   HENT:      Head: Normocephalic and atraumatic.      Nose: Nose normal.      Mouth/Throat:      Mouth: Mucous membranes are moist.   Eyes:      Extraocular Movements: Extraocular movements  "intact.      Pupils: Pupils are equal, round, and reactive to light.   Cardiovascular:      Rate and Rhythm: Normal rate and regular rhythm.      Pulses: Normal pulses.      Heart sounds: Normal heart sounds.   Pulmonary:      Effort: Pulmonary effort is normal.      Breath sounds: Rales present.   Abdominal:      General: Bowel sounds are normal.      Palpations: Abdomen is soft.   Musculoskeletal:         General: Swelling present. Normal range of motion.      Cervical back: Normal range of motion.      Right lower leg: Edema present.      Left lower leg: Edema present.      Comments: 1+ pitting   Skin:     General: Skin is warm and dry.      Capillary Refill: Capillary refill takes less than 2 seconds.   Neurological:      Mental Status: He is alert. Mental status is at baseline.      Motor: Weakness present.      Gait: Gait abnormal.   Psychiatric:         Mood and Affect: Mood normal.         Behavior: Behavior normal.      Comments: Confused          Last Recorded Vitals  Blood pressure 145/82, pulse 84, temperature 36.4 °C (97.5 °F), temperature source Temporal, resp. rate 18, height 1.803 m (5' 11\"), weight 71.7 kg (158 lb), SpO2 97%.    Relevant Results  Scheduled medications  apixaban, 5 mg, oral, BID  atorvastatin, 40 mg, oral, Nightly  azithromycin, 500 mg, oral, q24h LAKISHA  [START ON 11/6/2024] eplerenone, 25 mg, oral, Daily  [Held by provider] furosemide, 40 mg, oral, Daily  guaiFENesin, 1,200 mg, oral, BID  ipratropium-albuteroL, 3 mL, nebulization, q6h  methylPREDNISolone sodium succinate (PF), 40 mg, intravenous, q8h  metoprolol succinate XL, 12.5 mg, oral, Daily  oxygen, , inhalation, Continuous - Inhalation      Continuous medications     PRN medications  PRN medications: acetaminophen **OR** acetaminophen **OR** acetaminophen, melatonin, ondansetron **OR** ondansetron, polyethylene glycol    CT angio chest for pulmonary embolism    Result Date: 11/5/2024  Interpreted By:  Jacob Dyson, STUDY: CT " ANGIO CHEST FOR PULMONARY EMBOLISM;  11/5/2024 1:33 pm   INDICATION: Signs/Symptoms:SOB.   COMPARISON: CT chest 09/12/2024.   ACCESSION NUMBER(S): [id number]   ORDERING CLINICIAN: RACHEL VASQUEZ   TECHNIQUE: Helical data acquisition of the chest was obtained following intravenous administration of 75 ml of Omnipaque 350. Images were reformatted in coronal and sagittal planes. Axial and coronal MIP images were created and reviewed.   FINDINGS: POTENTIAL LIMITATIONS OF THE STUDY: None   HEART AND VESSELS: No discrete filling defects within the main pulmonary artery or its branches. Previously shown filling defects in the right upper lobar pulmonary arteries are no longer present.   Main pulmonary artery is normal in caliber.   The thoracic aorta is of normal course and caliber with mild vascular calcifications. Grossly similar noncalcified plaques in the descending thoracic aorta.   No significant coronary artery calcifications.The study is not optimized for evaluation of coronary arteries.   The cardiac chambers are not enlarged. Status post aortic valve replacement.   No evidence of pericardial effusion.   MEDIASTINUM AND LUIS MIGUEL, LOWER NECK AND AXILLA: The visualized thyroid gland is within normal limits.   No evidence of thoracic lymphadenopathy by CT criteria.   Fluid within the esophagus.   LUNGS AND AIRWAYS: There is diffuse bronchial wall thickening throughout all 5 lobes with multifocal mucous plugging. Mild bilateral lobe atelectasis. No pulmonary edema, pleural effusion or pneumothorax. No suspicious nodules identified.   UPPER ABDOMEN: Grossly stable varying-sized hepatic cysts. Mild pneumobilia, likely postprocedural. Bilateral renal cysts. Nonobstructing punctate right upper pole renal calculus.   CHEST WALL AND OSSEOUS STRUCTURES: Mild bilateral gynecomastia. Multilevel degenerative disc disease with bridging osteophytes and superimposed diffuse idiopathic skeletal hyperostosis.       No acute PE.  Resolution of prior right upper lobar PE.   Mild diffuse bronchitis with multifocal mucous plugging. Fluid within the esophagus with increased aspiration risk.   MACRO None   Signed by: Jacob Dyson 11/5/2024 2:21 PM Dictation workstation:   [id number]     Results for orders placed or performed during the hospital encounter of 11/05/24 (from the past 24 hours)   Sars-CoV-2 PCR   Result Value Ref Range    Coronavirus 2019, PCR Not Detected Not Detected   Influenza A, and B PCR   Result Value Ref Range    Flu A Result Not Detected Not Detected    Flu B Result Not Detected Not Detected   CBC and Auto Differential   Result Value Ref Range    WBC 7.8 4.4 - 11.3 x10*3/uL    nRBC 0.0 0.0 - 0.0 /100 WBCs    RBC 4.09 (L) 4.50 - 5.90 x10*6/uL    Hemoglobin 12.5 (L) 13.5 - 17.5 g/dL    Hematocrit 37.0 (L) 41.0 - 52.0 %    MCV 91 80 - 100 fL    MCH 30.6 26.0 - 34.0 pg    MCHC 33.8 32.0 - 36.0 g/dL    RDW 13.9 11.5 - 14.5 %    Platelets 122 (L) 150 - 450 x10*3/uL    Neutrophils % 72.4 40.0 - 80.0 %    Immature Granulocytes %, Automated 0.3 0.0 - 0.9 %    Lymphocytes % 11.0 13.0 - 44.0 %    Monocytes % 6.7 2.0 - 10.0 %    Eosinophils % 8.6 0.0 - 6.0 %    Basophils % 1.0 0.0 - 2.0 %    Neutrophils Absolute 5.65 (H) 1.60 - 5.50 x10*3/uL    Immature Granulocytes Absolute, Automated 0.02 0.00 - 0.50 x10*3/uL    Lymphocytes Absolute 0.86 0.80 - 3.00 x10*3/uL    Monocytes Absolute 0.52 0.05 - 0.80 x10*3/uL    Eosinophils Absolute 0.67 (H) 0.00 - 0.40 x10*3/uL    Basophils Absolute 0.08 0.00 - 0.10 x10*3/uL   Comprehensive metabolic panel   Result Value Ref Range    Glucose 130 (H) 74 - 99 mg/dL    Sodium 131 (L) 136 - 145 mmol/L    Potassium 4.2 3.5 - 5.3 mmol/L    Chloride 99 98 - 107 mmol/L    Bicarbonate 26 21 - 32 mmol/L    Anion Gap 10 10 - 20 mmol/L    Urea Nitrogen 25 (H) 6 - 23 mg/dL    Creatinine 1.25 0.50 - 1.30 mg/dL    eGFR 55 (L) >60 mL/min/1.73m*2    Calcium 8.9 8.6 - 10.3 mg/dL    Albumin 3.5 3.4 - 5.0 g/dL     Alkaline Phosphatase 129 33 - 136 U/L    Total Protein 5.4 (L) 6.4 - 8.2 g/dL    AST 18 9 - 39 U/L    Bilirubin, Total 1.7 (H) 0.0 - 1.2 mg/dL    ALT 15 10 - 52 U/L   B-type natriuretic peptide   Result Value Ref Range     (H) 0 - 99 pg/mL   Lactate   Result Value Ref Range    Lactate 1.8 0.4 - 2.0 mmol/L   Urinalysis with Reflex Culture and Microscopic   Result Value Ref Range    Color, Urine Light-Yellow Light-Yellow, Yellow, Dark-Yellow    Appearance, Urine Clear Clear    Specific Gravity, Urine 1.015 1.005 - 1.035    pH, Urine 6.5 5.0, 5.5, 6.0, 6.5, 7.0, 7.5, 8.0    Protein, Urine NEGATIVE NEGATIVE, 10 (TRACE), 20 (TRACE) mg/dL    Glucose, Urine Normal Normal mg/dL    Blood, Urine 0.2 (2+) (A) NEGATIVE    Ketones, Urine NEGATIVE NEGATIVE mg/dL    Bilirubin, Urine NEGATIVE NEGATIVE    Urobilinogen, Urine Normal Normal mg/dL    Nitrite, Urine NEGATIVE NEGATIVE    Leukocyte Esterase, Urine NEGATIVE NEGATIVE   Urinalysis Microscopic   Result Value Ref Range    WBC, Urine 6-10 (A) 1-5, NONE /HPF    RBC, Urine 11-20 (A) NONE, 1-2, 3-5 /HPF    Mucus, Urine FEW Reference range not established. /LPF        Assessment/Plan   Assessment & Plan  Acute on chronic congestive heart failure, unspecified heart failure type    HTN (hypertension)    Hyperlipemia    COPD exacerbation (Multi)    Coronary artery disease involving native coronary artery of native heart without angina pectoris    Dyspnea, unspecified type    Chronic kidney disease, stage 3 unspecified (Multi)    History of pulmonary embolism      Principal Problems  #Acute on chronic congestive heart failure, unspecified heart failure type  #HTN (hypertension)  #Hyperlipemia  #Coronary artery disease involving native coronary artery of native heart without angina pectoris  -LR 1,000 ml IV given in ED  -  -Magnesium Ordered   -continue Metoprolol, Lipitor, Inspra  -Lasix Held  -Bumex 1 g IV given in ED  -Cardiology consult, appreciate recs     Active  Problems  #COPD exacerbation (Multi)  #Dyspnea, unspecified type  -Covid/Flu Negative  -WBC 7.8  -Lactate 1.8  -BC x 2 Ordered  -Respiratory Culture if Able  -Procalcitonin Ordered  -CTA Chest  IMPRESSION:  No acute PE. Resolution of prior right upper lobar PE.   Mild diffuse bronchitis with multifocal mucous plugging. Fluid within  the esophagus with increased aspiration risk.  -Zithromax 500 mg Daily (Day 1)  -DuoNebs  -Solu Medrol 40 mg IV Q 8 hours  -Home O2 RA  -Currently on 2L NC    #Chronic kidney disease, stage 3 unspecified (Multi)  -BUN/Creat/GFR  25/1.25  -LR 1,000 ml IV given in ED    #History of pulmonary embolism  -continue Eliquis    DVT Prophylaxis: Eliquis  Fluids: N/A   Nutrition: Cardiac     Code Status: Full Code    Pt requires inpatient stay at this time.  Total accumulated time spent face to face and not face to face preparing to see the patient, obtaining and reviewing separately obtained history; performing a medically appropriate examination and/or evaluation; counseling and educating the patient, family; ordering medications, tests, or procedures; referring and communicating with other health care professionals; documenting clinical information in the patient's medical record; independently interpreting results and communicating the results to the patient, family; and care coordination was 45 minutes.    MARY Jack    Attending Attestation:    I was present with the APRN-CNP who participated in the documentation of this note. I have personally seen and re-examined the patient and performed the medical decision-making components (assessment and plan of care). I have reviewed the documentation and verified the findings in the note as written with additions or exceptions as stated in the body of this note.    Gregg Marcial MD  Internal Medicine

## 2024-11-05 NOTE — NURSING NOTE
Patient admitted from ED via cart for CHF. Patient accompanied by family. Oxygen at 3LPM via NC. Patient does not have home oxygen. Patient short of breath and coughing intermittently. HOB elevated. Patient and significant other hard of hearing. Both unsure of medications and dosages. Patient oriented to room and unit. Call light within reach.

## 2024-11-06 LAB
ANION GAP SERPL CALC-SCNC: 10 MMOL/L (ref 10–20)
BNP SERPL-MCNC: 331 PG/ML (ref 0–99)
BUN SERPL-MCNC: 27 MG/DL (ref 6–23)
CALCIUM SERPL-MCNC: 8.7 MG/DL (ref 8.6–10.3)
CHLORIDE SERPL-SCNC: 101 MMOL/L (ref 98–107)
CO2 SERPL-SCNC: 26 MMOL/L (ref 21–32)
CREAT SERPL-MCNC: 1.26 MG/DL (ref 0.5–1.3)
EGFRCR SERPLBLD CKD-EPI 2021: 54 ML/MIN/1.73M*2
ERYTHROCYTE [DISTWIDTH] IN BLOOD BY AUTOMATED COUNT: 13.6 % (ref 11.5–14.5)
GLUCOSE SERPL-MCNC: 158 MG/DL (ref 74–99)
HCT VFR BLD AUTO: 38 % (ref 41–52)
HGB BLD-MCNC: 12.7 G/DL (ref 13.5–17.5)
HOLD SPECIMEN: NORMAL
MAGNESIUM SERPL-MCNC: 2.28 MG/DL (ref 1.6–2.4)
MCH RBC QN AUTO: 30 PG (ref 26–34)
MCHC RBC AUTO-ENTMCNC: 33.4 G/DL (ref 32–36)
MCV RBC AUTO: 90 FL (ref 80–100)
NRBC BLD-RTO: 0 /100 WBCS (ref 0–0)
PLATELET # BLD AUTO: 108 X10*3/UL (ref 150–450)
POTASSIUM SERPL-SCNC: 4.4 MMOL/L (ref 3.5–5.3)
PROCALCITONIN SERPL-MCNC: 0.06 NG/ML
RBC # BLD AUTO: 4.23 X10*6/UL (ref 4.5–5.9)
SODIUM SERPL-SCNC: 133 MMOL/L (ref 136–145)
WBC # BLD AUTO: 5.7 X10*3/UL (ref 4.4–11.3)

## 2024-11-06 PROCEDURE — 1200000002 HC GENERAL ROOM WITH TELEMETRY DAILY: Mod: IPSPLIT

## 2024-11-06 PROCEDURE — 85027 COMPLETE CBC AUTOMATED: CPT | Mod: IPSPLIT

## 2024-11-06 PROCEDURE — 99232 SBSQ HOSP IP/OBS MODERATE 35: CPT | Performed by: NURSE PRACTITIONER

## 2024-11-06 PROCEDURE — 82374 ASSAY BLOOD CARBON DIOXIDE: CPT | Mod: IPSPLIT

## 2024-11-06 PROCEDURE — 2500000005 HC RX 250 GENERAL PHARMACY W/O HCPCS: Mod: IPSPLIT | Performed by: NURSE PRACTITIONER

## 2024-11-06 PROCEDURE — 84145 PROCALCITONIN (PCT): CPT | Mod: GENLAB

## 2024-11-06 PROCEDURE — 83880 ASSAY OF NATRIURETIC PEPTIDE: CPT | Mod: IPSPLIT

## 2024-11-06 PROCEDURE — 36415 COLL VENOUS BLD VENIPUNCTURE: CPT | Mod: IPSPLIT

## 2024-11-06 PROCEDURE — 94760 N-INVAS EAR/PLS OXIMETRY 1: CPT | Mod: IPSPLIT

## 2024-11-06 PROCEDURE — 83735 ASSAY OF MAGNESIUM: CPT | Mod: IPSPLIT

## 2024-11-06 PROCEDURE — 2500000004 HC RX 250 GENERAL PHARMACY W/ HCPCS (ALT 636 FOR OP/ED): Mod: IPSPLIT

## 2024-11-06 PROCEDURE — 2500000001 HC RX 250 WO HCPCS SELF ADMINISTERED DRUGS (ALT 637 FOR MEDICARE OP): Mod: IPSPLIT

## 2024-11-06 PROCEDURE — 2500000002 HC RX 250 W HCPCS SELF ADMINISTERED DRUGS (ALT 637 FOR MEDICARE OP, ALT 636 FOR OP/ED): Mod: IPSPLIT

## 2024-11-06 PROCEDURE — 94640 AIRWAY INHALATION TREATMENT: CPT | Mod: IPSPLIT

## 2024-11-06 PROCEDURE — 2500000005 HC RX 250 GENERAL PHARMACY W/O HCPCS: Mod: IPSPLIT

## 2024-11-06 PROCEDURE — 2500000001 HC RX 250 WO HCPCS SELF ADMINISTERED DRUGS (ALT 637 FOR MEDICARE OP): Mod: IPSPLIT | Performed by: NURSE PRACTITIONER

## 2024-11-06 ASSESSMENT — ENCOUNTER SYMPTOMS
SHORTNESS OF BREATH: 1
WEAKNESS: 1
COUGH: 1
CHILLS: 0
ABDOMINAL DISTENTION: 0
FEVER: 0
ABDOMINAL PAIN: 0
PALPITATIONS: 0
DIZZINESS: 0
FATIGUE: 1

## 2024-11-06 ASSESSMENT — PAIN - FUNCTIONAL ASSESSMENT
PAIN_FUNCTIONAL_ASSESSMENT: 0-10
PAIN_FUNCTIONAL_ASSESSMENT: 0-10

## 2024-11-06 ASSESSMENT — PAIN SCALES - GENERAL
PAINLEVEL_OUTOF10: 2
PAINLEVEL_OUTOF10: 0 - NO PAIN
PAINLEVEL_OUTOF10: 0 - NO PAIN

## 2024-11-06 ASSESSMENT — PAIN DESCRIPTION - LOCATION: LOCATION: GENERALIZED

## 2024-11-06 ASSESSMENT — ACTIVITIES OF DAILY LIVING (ADL): LACK_OF_TRANSPORTATION: NO

## 2024-11-06 NOTE — PROGRESS NOTES
Radhames Ken is a 89 y.o. male on day 1 of admission presenting with Acute on chronic congestive heart failure, unspecified heart failure type.      Subjective   Patient assessed at bedside; sitting up in bed. He is confused; alert to self. He has conversational dyspnea; on oxygen 4lpm via NC. Wife at bedside; POC discussed with patient and wife       Objective     Last Recorded Vitals  /58 (BP Location: Right arm, Patient Position: Lying)   Pulse 87   Temp 36.8 °C (98.2 °F) (Temporal)   Resp 16   Wt 76.4 kg (168 lb 6.9 oz)   SpO2 93%   Intake/Output last 3 Shifts:  No intake or output data in the 24 hours ending 11/06/24 1142    Admission Weight  Weight: 71.7 kg (158 lb) (11/05/24 1212)    Daily Weight  11/06/24 : 76.4 kg (168 lb 6.9 oz)    Image Results      Physical Exam  Vitals reviewed.   Constitutional:       Appearance: Normal appearance. He is normal weight.   HENT:      Head: Normocephalic and atraumatic.      Right Ear: External ear normal.      Left Ear: External ear normal.      Nose: Nose normal.      Mouth/Throat:      Mouth: Mucous membranes are moist.      Pharynx: Oropharynx is clear.   Eyes:      Pupils: Pupils are equal, round, and reactive to light.   Cardiovascular:      Rate and Rhythm: Normal rate and regular rhythm.      Pulses: Normal pulses.      Heart sounds: Normal heart sounds.   Pulmonary:      Effort: Respiratory distress present.      Breath sounds: Wheezing and rhonchi present.      Comments: Conversational dyspnea, accessory muscle use  Abdominal:      General: Bowel sounds are normal.      Palpations: Abdomen is soft.   Musculoskeletal:         General: Normal range of motion.      Cervical back: Normal range of motion and neck supple.   Skin:     General: Skin is warm and dry.   Neurological:      General: No focal deficit present.      Mental Status: He is alert. He is disoriented.      Motor: Weakness present.   Psychiatric:         Mood and Affect: Mood normal.          Behavior: Behavior normal.         Relevant Results    Scheduled medications  apixaban, 5 mg, oral, BID  atorvastatin, 40 mg, oral, Nightly  azithromycin, 500 mg, oral, q24h LAKISHA  eplerenone, 25 mg, oral, Daily  furosemide, 40 mg, oral, Daily  guaiFENesin, 1,200 mg, oral, BID  ipratropium-albuteroL, 3 mL, nebulization, 4x daily  methylPREDNISolone sodium succinate (PF), 40 mg, intravenous, q8h  metoprolol succinate XL, 12.5 mg, oral, Daily  oxygen, , inhalation, Continuous - Inhalation      Continuous medications     PRN medications  PRN medications: acetaminophen **OR** [DISCONTINUED] acetaminophen **OR** [DISCONTINUED] acetaminophen, albuterol, melatonin, ondansetron **OR** ondansetron, polyethylene glycol    Results for orders placed or performed during the hospital encounter of 11/05/24 (from the past 24 hours)   Sars-CoV-2 PCR   Result Value Ref Range    Coronavirus 2019, PCR Not Detected Not Detected   Influenza A, and B PCR   Result Value Ref Range    Flu A Result Not Detected Not Detected    Flu B Result Not Detected Not Detected   CBC and Auto Differential   Result Value Ref Range    WBC 7.8 4.4 - 11.3 x10*3/uL    nRBC 0.0 0.0 - 0.0 /100 WBCs    RBC 4.09 (L) 4.50 - 5.90 x10*6/uL    Hemoglobin 12.5 (L) 13.5 - 17.5 g/dL    Hematocrit 37.0 (L) 41.0 - 52.0 %    MCV 91 80 - 100 fL    MCH 30.6 26.0 - 34.0 pg    MCHC 33.8 32.0 - 36.0 g/dL    RDW 13.9 11.5 - 14.5 %    Platelets 122 (L) 150 - 450 x10*3/uL    Neutrophils % 72.4 40.0 - 80.0 %    Immature Granulocytes %, Automated 0.3 0.0 - 0.9 %    Lymphocytes % 11.0 13.0 - 44.0 %    Monocytes % 6.7 2.0 - 10.0 %    Eosinophils % 8.6 0.0 - 6.0 %    Basophils % 1.0 0.0 - 2.0 %    Neutrophils Absolute 5.65 (H) 1.60 - 5.50 x10*3/uL    Immature Granulocytes Absolute, Automated 0.02 0.00 - 0.50 x10*3/uL    Lymphocytes Absolute 0.86 0.80 - 3.00 x10*3/uL    Monocytes Absolute 0.52 0.05 - 0.80 x10*3/uL    Eosinophils Absolute 0.67 (H) 0.00 - 0.40 x10*3/uL    Basophils  Absolute 0.08 0.00 - 0.10 x10*3/uL   Comprehensive metabolic panel   Result Value Ref Range    Glucose 130 (H) 74 - 99 mg/dL    Sodium 131 (L) 136 - 145 mmol/L    Potassium 4.2 3.5 - 5.3 mmol/L    Chloride 99 98 - 107 mmol/L    Bicarbonate 26 21 - 32 mmol/L    Anion Gap 10 10 - 20 mmol/L    Urea Nitrogen 25 (H) 6 - 23 mg/dL    Creatinine 1.25 0.50 - 1.30 mg/dL    eGFR 55 (L) >60 mL/min/1.73m*2    Calcium 8.9 8.6 - 10.3 mg/dL    Albumin 3.5 3.4 - 5.0 g/dL    Alkaline Phosphatase 129 33 - 136 U/L    Total Protein 5.4 (L) 6.4 - 8.2 g/dL    AST 18 9 - 39 U/L    Bilirubin, Total 1.7 (H) 0.0 - 1.2 mg/dL    ALT 15 10 - 52 U/L   B-type natriuretic peptide   Result Value Ref Range     (H) 0 - 99 pg/mL   Lactate   Result Value Ref Range    Lactate 1.8 0.4 - 2.0 mmol/L   Blood Culture    Specimen: Peripheral Venipuncture; Blood culture   Result Value Ref Range    Blood Culture Loaded on Instrument - Culture in progress    Blood Culture    Specimen: Peripheral Venipuncture; Blood culture   Result Value Ref Range    Blood Culture Loaded on Instrument - Culture in progress    Urinalysis with Reflex Culture and Microscopic   Result Value Ref Range    Color, Urine Light-Yellow Light-Yellow, Yellow, Dark-Yellow    Appearance, Urine Clear Clear    Specific Gravity, Urine 1.015 1.005 - 1.035    pH, Urine 6.5 5.0, 5.5, 6.0, 6.5, 7.0, 7.5, 8.0    Protein, Urine NEGATIVE NEGATIVE, 10 (TRACE), 20 (TRACE) mg/dL    Glucose, Urine Normal Normal mg/dL    Blood, Urine 0.2 (2+) (A) NEGATIVE    Ketones, Urine NEGATIVE NEGATIVE mg/dL    Bilirubin, Urine NEGATIVE NEGATIVE    Urobilinogen, Urine Normal Normal mg/dL    Nitrite, Urine NEGATIVE NEGATIVE    Leukocyte Esterase, Urine NEGATIVE NEGATIVE   Extra Urine Gray Tube   Result Value Ref Range    Extra Tube Hold for add-ons.    Urinalysis Microscopic   Result Value Ref Range    WBC, Urine 6-10 (A) 1-5, NONE /HPF    RBC, Urine 11-20 (A) NONE, 1-2, 3-5 /HPF    Mucus, Urine FEW Reference range  not established. /LPF   Magnesium   Result Value Ref Range    Magnesium 2.28 1.60 - 2.40 mg/dL   CBC   Result Value Ref Range    WBC 5.7 4.4 - 11.3 x10*3/uL    nRBC 0.0 0.0 - 0.0 /100 WBCs    RBC 4.23 (L) 4.50 - 5.90 x10*6/uL    Hemoglobin 12.7 (L) 13.5 - 17.5 g/dL    Hematocrit 38.0 (L) 41.0 - 52.0 %    MCV 90 80 - 100 fL    MCH 30.0 26.0 - 34.0 pg    MCHC 33.4 32.0 - 36.0 g/dL    RDW 13.6 11.5 - 14.5 %    Platelets 108 (L) 150 - 450 x10*3/uL   Basic Metabolic Panel   Result Value Ref Range    Glucose 158 (H) 74 - 99 mg/dL    Sodium 133 (L) 136 - 145 mmol/L    Potassium 4.4 3.5 - 5.3 mmol/L    Chloride 101 98 - 107 mmol/L    Bicarbonate 26 21 - 32 mmol/L    Anion Gap 10 10 - 20 mmol/L    Urea Nitrogen 27 (H) 6 - 23 mg/dL    Creatinine 1.26 0.50 - 1.30 mg/dL    eGFR 54 (L) >60 mL/min/1.73m*2    Calcium 8.7 8.6 - 10.3 mg/dL   B-Type Natriuretic Peptide   Result Value Ref Range     (H) 0 - 99 pg/mL                   Assessment/Plan   This patient currently has cardiac telemetry ordered; if you would like to modify or discontinue the telemetry order, click here to go to the orders activity to modify/discontinue the order.    Assessment & Plan  Acute on chronic congestive heart failure, unspecified heart failure type    HTN (hypertension)    Hyperlipemia    COPD exacerbation (Multi)    Coronary artery disease involving native coronary artery of native heart without angina pectoris    Dyspnea, unspecified type    Chronic kidney disease, stage 3 unspecified (Multi)    History of pulmonary embolism    Acute on chronic diastolic heart failure  Essential HTN (hypertension)  Hypercholesteremia  ASHD  -LR 1,000 ml IV given in ED  - > 331  -Magnesium Ordered   -last echo 9/13/24: normal LVSF with an EF of 55-60% with impaired relaxation pattern of LVDF, moderate MR  -continue Metoprolol succinate 12.5 mg daily, atorvastatin 40 mg hs, eplerenone 25 mg daily,  -resumed furosemide 40 mg daily  -Bumex 1 g IV given in  ED  -Cardiology consult, appreciate recs   -cardiac monitoring via telemetry  -started apixaban 5 mg BID  -daily weight  -I&O     COPD exacerbation (Multi)  Acute Hypoxic respiratory failure  -Covid/Flu Negative  -WBC 7.8  -Lactate 1.8  -Blood cultures pending  -Respiratory Culture if Able  -Procalcitonin pending  -CTA Chest: No acute PE. Resolution of prior right upper lobar PE.   Mild diffuse bronchitis with multifocal mucous plugging. Fluid within  the esophagus with increased aspiration risk.  -continue azithromycin 500 mg Daily (Day 2)  -DuoNebs q6h  -continue Solu Medrol 40 mg IV Q 8 hours  -continue guaifenesin 1,200 mg BID  -supplemental oxygen to keep SpO2 > 90%  -Home O2 RA  -Currently on 4L NC  -RT for bronchial hygiene     Chronic kidney disease, stage 3 unspecified (Multi)  -BUN/Creat/GFR  25/1.25  -LR 1,000 ml IV given in ED     History of pulmonary embolism  -continue apixban 5 mg BID     DVT Prophylaxis:   -continue apixaban 5 mg bid        Code Status: Full Code     Disposition: Pt requires inpatient stay at this time.              Ernestine Grubbs, APRN-CNP

## 2024-11-06 NOTE — NURSING NOTE
Radhames needed constant care until around 0300. He has a harsh productive cough and spitting out thick yellow. His wife has remained at his bedside assisting with his care. Radhames's wheezing has much improved.    [facility] EMERGENCY DEPT  EMERGENCY DEPARTMENT ENCOUNTER       Pt Name: Rell Giraldo  MRN: [id number]  [place] 1953  Date of evaluation: 3/6/2023  Provider: Elicia Pavon NP   PCP: Bebe Rodriguez MD  Note Started: 11:18 PM 3/6/23     ED attending involment: I have seen and evaluated the patient. My supervision physician was available for consultation. CHIEF COMPLAINT       Chief Complaint   Patient presents with    Skin Problem     Abscess on upper back x 2 weeks. HISTORY OF PRESENT ILLNESS: 1 or more elements      History From: Patient  HPI Limitations : None     Rell Giraldo is a 71 y.o. female who presents with abscess. Onset 2 weeks ago. Located to the upper back. Denies fever, chills. Patient is present with her daughter who has been cleaning her back. She reports area opened approximately 3 days ago. She states there is drainage no redness. She reports history of DM and states blood glucose between 100-200. Denies polyuria, polydipsia     Nursing Notes were all reviewed and agreed with or any disagreements were addressed in the HPI. REVIEW OF SYSTEMS      Review of Systems   Constitutional:  Negative for appetite change, chills, fatigue and fever. HENT:  Negative for congestion, ear pain and rhinorrhea. Eyes:  Negative for pain and itching. Respiratory:  Negative for cough and shortness of breath. Cardiovascular:  Negative for chest pain. Gastrointestinal:  Negative for abdominal pain, nausea and vomiting. Musculoskeletal:  Negative for arthralgias and joint swelling. Skin:  Positive for wound. All other systems reviewed and are negative. Positives and Pertinent negatives as per HPI. PAST HISTORY     Past Medical History:  Past Medical History:   Diagnosis Date    Asthma     Atrial fibrillation ([facility] [address]     Diabetes ([facility] [address]     Hypertension        Past Surgical History:  No past surgical history on file. Family History:  History reviewed.  No pertinent family history. Social History:  Social History     Tobacco Use    Smoking status: Never    Smokeless tobacco: Never   Substance Use Topics    Alcohol use: No    Drug use: No       Allergies:  No Known Allergies    CURRENT MEDICATIONS      Previous Medications    ALBUTEROL-IPRATROPIUM (DUO-NEB) 2.5 MG-0.5 MG/3 ML NEBU    3 mL by Nebulization route every six (6) hours as needed. ASPIRIN DELAYED-RELEASE 81 MG TABLET    Take 81 mg by mouth daily. FLUTICASONE-SALMETEROL (ADVAIR DISKUS) 250-50 MCG/DOSE DISKUS INHALER    Take 1 Puff by inhalation every twelve (12) hours. HYDRALAZINE (APRESOLINE) 50 MG TABLET    Take 1 Tab by mouth four (4) times daily. HYDROCHLOROTHIAZIDE (HYDRODIURIL) 25 MG TABLET    Take 25 mg by mouth daily. INSULIN GLARGINE (LANTUS SOLOSTAR U-100 INSULIN) 100 UNIT/ML (3 ML) INPN    30 units once daily    INSULIN NEEDLES, DISPOSABLE, (DYLAN PEN NEEDLE) 32 GAUGE X 5/32\" NDLE    1 Each by Does Not Apply route daily. LOSARTAN (COZAAR) 50 MG TABLET    Take 1 Tab by mouth daily. METFORMIN ER (GLUCOPHAGE XR) 500 MG TABLET    Take 1 Tablet by mouth two (2) times a day. LAST REFILL W/O APPT    SOTALOL (SOTALOL AF) 120 MG TABLET    Take 120 mg by mouth daily. TRIAMCINOLONE ACETONIDE (KENALOG) 0.1 % TOPICAL CREAM    Apply  to affected area two (2) times daily as needed for Skin Irritation. use thin layer    XARELTO 20 MG TAB TABLET    Take 20 mg by mouth daily. PHYSICAL EXAM      ED Triage Vitals [03/06/23 2210]   ED Encounter Vitals Group      BP (!) 175/86      Pulse (Heart Rate) 63      Resp Rate 16      Temp 98.6 °F (37 °C)      Temp src       O2 Sat (%) 98 %      Weight 224 lb      Height 5' 7\"        Physical Exam  Vitals and nursing note reviewed. Constitutional:       General: She is not in acute distress. Appearance: She is well-developed. She is not ill-appearing. Cardiovascular:      Rate and Rhythm: Normal rate and regular rhythm. Pulses: Normal pulses. Heart sounds: Normal heart sounds. Pulmonary:      Effort: Pulmonary effort is normal.      Breath sounds: Normal breath sounds. Skin:     General: Skin is warm and dry. Findings: Abscess (5x5 draining abscess to the L mid back with large amoutn of purulent drainage. no fluctuance noted) present. Neurological:      Mental Status: She is alert and oriented to person, place, and time. DIAGNOSTIC RESULTS   LABS:     No results found for this or any previous visit (from the past 12 hour(s)). RADIOLOGY:  Non-plain film images such as CT, Ultrasound and MRI are read by the radiologist. Plain radiographic images are visualized and preliminarily interpreted by the ED Provider with the below findings:     Interpretation per the Radiologist below, if available at the time of this note:     No results found. PROCEDURES   Unless otherwise noted below, none  Procedures     EMERGENCY DEPARTMENT COURSE and DIFFERENTIAL DIAGNOSIS/MDM   Vitals:    Vitals:    03/06/23 2210   BP: (!) 175/86   Pulse: 63   Resp: 16   Temp: 98.6 °F (37 °C)   SpO2: 98%   Weight: 101.6 kg (224 lb)   Height: 5' 7\" (1.702 m)        Patient was given the following medications:  Medications - No data to display    CONSULTS: (Who and What was discussed)  None    Chronic Conditions: DM    Social Determinants affecting Dx or Tx: None    Records Reviewed (source and summary): Prior medical records, Previous Radiology studies, Previous Laboratory studies, and Nursing notes    MDM (CC/HPI Summary, DDx, ED Course, Reassessment, Disposition Considerations -Tests not done, Shared Decision Making, Pt Expectation of Test or Tx.): 75-year-old female presenting with skin problem exhibiting abscess to the left mid back that is currently draining large amounts of purulent drainage. Patient arrives with no fever and she does not complain of chills.   It does appear that this abscess has improved in size based on the pictures shown by patient's daughter. I do not appreciate an area on the abscess to incision and drain due to lack of fluctuance. Plan to treat with p.o. antibiotics. Advised to clean with soap and water will approximate in alcohol. FINAL IMPRESSION     1. Abscess          DISPOSITION/PLAN   Discharged        Care plan outlined and precautions discussed. Patient has no new complaints, changes, or physical findings. All of pt's questions and concerns were addressed. Patient was instructed and agrees to follow up with PCP, as well as to return to the ED upon further deterioration. Patient is ready to go home. PATIENT REFERRED TO:  Follow-up Information       Follow up With Specialties Details Why Contact Info    Shelly Mcgovern MD Family Medicine Call in 1 week As needed [phone] [facility]  [address] [phone]                DISCHARGE MEDICATIONS:  Current Discharge Medication List        START taking these medications    Details   doxycycline (VIBRA-TABS) 100 mg tablet Take 1 Tablet by mouth two (2) times a day for 7 days. Qty: 14 Tablet, Refills: 0  Start date: 3/6/2023, End date: 3/13/2023      cephALEXin (Keflex) 500 mg capsule Take 1 Capsule by mouth two (2) times a day for 7 days. Qty: 14 Capsule, Refills: 0  Start date: 3/6/2023, End date: 3/13/2023      traMADoL (Ultram) 50 mg tablet Take 1 Tablet by mouth every six (6) hours as needed for Pain for up to 5 days. Max Daily Amount: 200 mg. Qty: 6 Tablet, Refills: 0  Start date: 3/6/2023, End date: 3/11/2023    Associated Diagnoses: Abscess               DISCONTINUED MEDICATIONS:  Current Discharge Medication List        STOP taking these medications       oxyCODONE-acetaminophen (PERCOCET 10)  mg per tablet Comments:   Reason for Stopping:               I am the Primary Clinician of Record. Jana Kumar NP (electronically signed)    (Please note that parts of this dictation were completed with voice recognition software.  Quite often unanticipated grammatical, syntax, homophones, and other interpretive errors are inadvertently transcribed by the computer software. Please disregards these errors.  Please excuse any errors that have escaped final proofreading.)

## 2024-11-06 NOTE — CONSULTS
Inpatient consult to Cardiology  Consult performed by: MARY Koch  Consult ordered by: MARY Jack  Reason for consult: CHF          History Of Present Illness  Radhames Ken is a 89 y.o. male presenting with complaints of shortness of breath and a cough. He denies any orthopnea, chest pain, dizziness, or palpitations. He reports chronic ankle edema and an occasional dry cough.     Lab work in the ER showed glucose 130, sodium 131, potassium 4.2, BUN/Cr 25/1.25, lactate 1.8, , WBC 7.8, H&H 12.5/37, CTA of the chest negative for PE, resolution of prior PE, mild bronchitis with multifocal mucous plugging, fluid within the esophagus with increased aspiration risk.     EKG showed SR with PVCs.    He was given IVF bolus and then given Bumex shortly after. He was also started on steroids and antibiotics.     This morning, he states he feels a lot better and about back to his baseline. Currently on 5L O2 per NC.       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    Diarrhea, functional    Heart disease     HL (hearing loss)     Hypertension     Other disorders of plasma-protein metabolism, not elsewhere classified 05/30/2017    Serum albumin decreased    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    Personal history of diseases of the skin and subcutaneous tissue 02/18/2019    History of eczema    Personal history of other diseases of male genital organs     History of BPH    Personal history of other diseases of the circulatory system     History of  hypertension    Personal history of other diseases of the circulatory system     History of mitral valve insufficiency    Personal history of other diseases of the musculoskeletal system and connective tissue     Personal history of arthritis    Personal history of other endocrine, nutritional and metabolic disease     History of hyperlipidemia    Personal history of other specified conditions 11/09/2018    History of seizure    Personal history of other specified conditions 05/05/2022    History of vomiting    Stroke (Multi)     Visual impairment        Surgical History  Past Surgical History:   Procedure Laterality Date    CARDIAC SURGERY  04/28/2014    Heart Surgery    CHOLECYSTECTOMY  04/28/2014    Cholecystectomy    GALLBLADDER SURGERY  04/28/2014    Gallbladder Surgery    MR HEAD ANGIO WO IV CONTRAST  9/30/2018    MR HEAD ANGIO WO IV CONTRAST 9/30/2018 [facility]GAC[facility]    MR NECK ANGIO WO IV CONTRAST  9/30/2018    MR NECK ANGIO WO IV CONTRAST 9/30/2018 [facility] CLINICAL LEGAC[facility]    OTHER SURGICAL HISTORY  04/28/2014    Knee Repair    OTHER SURGICAL HISTORY  04/28/2014    Needle Biopsy Of Prostate    OTHER SURGICAL HISTORY  03/05/2021    Complete colonoscopy    OTHER SURGICAL HISTORY  03/05/2021    Endoscopy    OTHER SURGICAL HISTORY  10/08/2018    Common Bile Duct Stone Removal    PROSTATE SURGERY  10/08/2018    Prostate Surgery        Social History  He reports that he has never smoked. He has never used smokeless tobacco. He reports that he does not currently use alcohol. He reports that he does not use drugs.    Family History  Family History   Problem Relation Name Age of Onset    Heart disease Mother Abiola     Breast cancer Mother Abiola     Lung cancer Father      Cancer Brother Arpit     Cancer Brother Kevin     Cancer Brother Tere     Cancer Brother          Allergies  Patient has no known allergies.    Review of Systems  Review of Systems   Constitutional:  Positive for fatigue. Negative for chills  "and fever.   Respiratory:  Positive for cough and shortness of breath.    Cardiovascular:  Positive for leg swelling. Negative for chest pain and palpitations.   Gastrointestinal:  Negative for abdominal distention and abdominal pain.   Musculoskeletal:  Negative for gait problem.   Neurological:  Positive for weakness. Negative for dizziness.   All other systems reviewed and are negative.         Physical Exam  Constitutional: Well developed, awake/alert/oriented x3, no distress, alert and cooperative  Eyes: PERRL, EOMI, clear sclera  ENMT: mucous membranes moist, no apparent injury, no lesions seen  Head/Neck: Neck supple, no apparent injury, thyroid without mass or tenderness, No JVD, trachea midline, no bruits  Respiratory/Thorax: Patent airways, expiratory wheezes anteriorly with good chest expansion, thorax symmetric  Cardiovascular: Regular, rate and rhythm, no murmurs, 2+ equal pulses of the extremities, normal S 1and S 2  Gastrointestinal: Nondistended, soft, non-tender, no rebound tenderness or guarding, no masses palpable, no organomegaly, +BS, no bruits  Musculoskeletal: ROM intact, no joint swelling, normal strength  Extremities: +1 pitting edema bilat ankles  Neurological: alert and oriented x3, intact senses, motor, response and reflexes, normal strength  Lymphatic: No significant lymphadenopathy  Psychological: Appropriate mood and behavior  Skin: Warm and dry, no lesions, no rashes       Last Recorded Vitals  Blood pressure 112/58, pulse 87, temperature 36.8 °C (98.2 °F), temperature source Temporal, resp. rate 16, height 1.803 m (5' 10.98\"), weight 76.4 kg (168 lb 6.9 oz), SpO2 93%.    Medications  Scheduled medications  apixaban, 5 mg, oral, BID  atorvastatin, 40 mg, oral, Nightly  azithromycin, 500 mg, oral, q24h LAKISHA  eplerenone, 25 mg, oral, Daily  furosemide, 40 mg, oral, Daily  guaiFENesin, 1,200 mg, oral, BID  ipratropium-albuteroL, 3 mL, nebulization, 4x daily  methylPREDNISolone sodium " succinate (PF), 40 mg, intravenous, q8h  metoprolol succinate XL, 12.5 mg, oral, Daily  oxygen, , inhalation, Continuous - Inhalation      Continuous medications     PRN medications  PRN medications: acetaminophen **OR** [DISCONTINUED] acetaminophen **OR** [DISCONTINUED] acetaminophen, albuterol, melatonin, ondansetron **OR** ondansetron, polyethylene glycol    Relevant Results  Results for orders placed or performed during the hospital encounter of 11/05/24 (from the past 24 hours)   Sars-CoV-2 PCR   Result Value Ref Range    Coronavirus 2019, PCR Not Detected Not Detected   Influenza A, and B PCR   Result Value Ref Range    Flu A Result Not Detected Not Detected    Flu B Result Not Detected Not Detected   CBC and Auto Differential   Result Value Ref Range    WBC 7.8 4.4 - 11.3 x10*3/uL    nRBC 0.0 0.0 - 0.0 /100 WBCs    RBC 4.09 (L) 4.50 - 5.90 x10*6/uL    Hemoglobin 12.5 (L) 13.5 - 17.5 g/dL    Hematocrit 37.0 (L) 41.0 - 52.0 %    MCV 91 80 - 100 fL    MCH 30.6 26.0 - 34.0 pg    MCHC 33.8 32.0 - 36.0 g/dL    RDW 13.9 11.5 - 14.5 %    Platelets 122 (L) 150 - 450 x10*3/uL    Neutrophils % 72.4 40.0 - 80.0 %    Immature Granulocytes %, Automated 0.3 0.0 - 0.9 %    Lymphocytes % 11.0 13.0 - 44.0 %    Monocytes % 6.7 2.0 - 10.0 %    Eosinophils % 8.6 0.0 - 6.0 %    Basophils % 1.0 0.0 - 2.0 %    Neutrophils Absolute 5.65 (H) 1.60 - 5.50 x10*3/uL    Immature Granulocytes Absolute, Automated 0.02 0.00 - 0.50 x10*3/uL    Lymphocytes Absolute 0.86 0.80 - 3.00 x10*3/uL    Monocytes Absolute 0.52 0.05 - 0.80 x10*3/uL    Eosinophils Absolute 0.67 (H) 0.00 - 0.40 x10*3/uL    Basophils Absolute 0.08 0.00 - 0.10 x10*3/uL   Comprehensive metabolic panel   Result Value Ref Range    Glucose 130 (H) 74 - 99 mg/dL    Sodium 131 (L) 136 - 145 mmol/L    Potassium 4.2 3.5 - 5.3 mmol/L    Chloride 99 98 - 107 mmol/L    Bicarbonate 26 21 - 32 mmol/L    Anion Gap 10 10 - 20 mmol/L    Urea Nitrogen 25 (H) 6 - 23 mg/dL    Creatinine 1.25  0.50 - 1.30 mg/dL    eGFR 55 (L) >60 mL/min/1.73m*2    Calcium 8.9 8.6 - 10.3 mg/dL    Albumin 3.5 3.4 - 5.0 g/dL    Alkaline Phosphatase 129 33 - 136 U/L    Total Protein 5.4 (L) 6.4 - 8.2 g/dL    AST 18 9 - 39 U/L    Bilirubin, Total 1.7 (H) 0.0 - 1.2 mg/dL    ALT 15 10 - 52 U/L   B-type natriuretic peptide   Result Value Ref Range     (H) 0 - 99 pg/mL   Lactate   Result Value Ref Range    Lactate 1.8 0.4 - 2.0 mmol/L   Blood Culture    Specimen: Peripheral Venipuncture; Blood culture   Result Value Ref Range    Blood Culture Loaded on Instrument - Culture in progress    Blood Culture    Specimen: Peripheral Venipuncture; Blood culture   Result Value Ref Range    Blood Culture Loaded on Instrument - Culture in progress    Urinalysis with Reflex Culture and Microscopic   Result Value Ref Range    Color, Urine Light-Yellow Light-Yellow, Yellow, Dark-Yellow    Appearance, Urine Clear Clear    Specific Gravity, Urine 1.015 1.005 - 1.035    pH, Urine 6.5 5.0, 5.5, 6.0, 6.5, 7.0, 7.5, 8.0    Protein, Urine NEGATIVE NEGATIVE, 10 (TRACE), 20 (TRACE) mg/dL    Glucose, Urine Normal Normal mg/dL    Blood, Urine 0.2 (2+) (A) NEGATIVE    Ketones, Urine NEGATIVE NEGATIVE mg/dL    Bilirubin, Urine NEGATIVE NEGATIVE    Urobilinogen, Urine Normal Normal mg/dL    Nitrite, Urine NEGATIVE NEGATIVE    Leukocyte Esterase, Urine NEGATIVE NEGATIVE   Extra Urine Gray Tube   Result Value Ref Range    Extra Tube Hold for add-ons.    Urinalysis Microscopic   Result Value Ref Range    WBC, Urine 6-10 (A) 1-5, NONE /HPF    RBC, Urine 11-20 (A) NONE, 1-2, 3-5 /HPF    Mucus, Urine FEW Reference range not established. /LPF   Magnesium   Result Value Ref Range    Magnesium 2.28 1.60 - 2.40 mg/dL   CBC   Result Value Ref Range    WBC 5.7 4.4 - 11.3 x10*3/uL    nRBC 0.0 0.0 - 0.0 /100 WBCs    RBC 4.23 (L) 4.50 - 5.90 x10*6/uL    Hemoglobin 12.7 (L) 13.5 - 17.5 g/dL    Hematocrit 38.0 (L) 41.0 - 52.0 %    MCV 90 80 - 100 fL    MCH 30.0 26.0 -  34.0 pg    MCHC 33.4 32.0 - 36.0 g/dL    RDW 13.6 11.5 - 14.5 %    Platelets 108 (L) 150 - 450 x10*3/uL   Basic Metabolic Panel   Result Value Ref Range    Glucose 158 (H) 74 - 99 mg/dL    Sodium 133 (L) 136 - 145 mmol/L    Potassium 4.4 3.5 - 5.3 mmol/L    Chloride 101 98 - 107 mmol/L    Bicarbonate 26 21 - 32 mmol/L    Anion Gap 10 10 - 20 mmol/L    Urea Nitrogen 27 (H) 6 - 23 mg/dL    Creatinine 1.26 0.50 - 1.30 mg/dL    eGFR 54 (L) >60 mL/min/1.73m*2    Calcium 8.7 8.6 - 10.3 mg/dL   B-Type Natriuretic Peptide   Result Value Ref Range     (H) 0 - 99 pg/mL       CT angio chest for pulmonary embolism   Final Result   No acute PE. Resolution of prior right upper lobar PE.        Mild diffuse bronchitis with multifocal mucous plugging. Fluid within   the esophagus with increased aspiration risk.        MACRO   None        Signed by: Jacob Dyson 11/5/2024 2:21 PM   Dictation workstation:   [id number]          Transthoracic Echo (TTE) Complete    Result Date: 9/13/2024   [facility], [address] [place] [address]              Tel [phone] and Fax [phone] TRANSTHORACIC ECHOCARDIOGRAM REPORT  Patient Name:      MELISSAROBERT ZAMUDIO     Reading Physician:    [id number] Eamon Kirby MD Study Date:        9/13/2024            Ordering Provider:    [id number] MCNEAL MRN/PID:           [id number]             Fellow: Accession#:        [id number]         Nurse: Date of Birth/Age: 11/11/1933 / 89      Sonographer:          Zuleyka Lopez RDCS                    years Gender:            M                    Additional Staff: Height:            180.34 cm            Admit Date:           9/11/2024 Weight:            64.41 kg             Admission Status:     Inpatient -                                                               Routine BSA / BMI:         1.82 m2  / 19.81      Encounter#:           [id number]                    kg/m2 Blood Pressure:    120/71 mmHg          Department Location:  [facility] Study Type:    TRANSTHORACIC ECHO (TTE) COMPLETE Diagnosis/ICD: Other pulmonary embolism without acute cor pulmonale-I26.99 Indication:    small PE CPT Code:      Echo Complete w Full Doppler-[id number] Patient History: Pertinent History: PE, HTN, HLD, CHF, CAD, SOB, AS s/p TAVR 26 singh 3 crystal                    3/20/23. Study Detail: The following Echo studies were performed: 2D, M-Mode, Doppler and               color flow. Technically challenging study due to prominent lung               artifact.  PHYSICIAN INTERPRETATION: Left Ventricle: The left ventricular systolic function is normal, with a visually estimated ejection fraction of 55-60%. There are no regional left ventricular wall motion abnormalities. The left ventricular cavity size is normal. There is mild left ventricular hypertrophy involving the septal wall. Spectral Doppler shows an impaired relaxation pattern of left ventricular diastolic filling. Left Atrium: The left atrium is mildly dilated. Right Ventricle: The right ventricle is normal in size. There is normal right ventricular global systolic function. Right Atrium: The right atrium is normal in size. Aortic Valve: There is a prosthetic aortic valve present. The aortic valve dimensionless index is 0.59. There is transcatheter aortic valve replacement. Echo findings are consistent with normal aortic valve prosthesis structure and function. There is no evidence of aortic valve regurgitation. The peak instantaneous gradient of the aortic valve is 21.5 mmHg. The mean gradient of the aortic valve is 13.0 mmHg. Mitral Valve: The mitral valve is mildly thickened. There is moderate mitral valve regurgitation which is anteriorly directed. There is flow reversal in the right pulmonary vein.  Tricuspid Valve: The tricuspid valve is structurally normal. There is mild tricuspid regurgitation. Pulmonic Valve: The pulmonic valve is structurally normal. There is physiologic pulmonic valve regurgitation. Pericardium: There is no pericardial effusion noted. Aorta: The aortic root is normal. Pulmonary Artery: The tricuspid regurgitant velocity is 2.45 m/s, and with an estimated right atrial pressure of 3 mmHg, the estimated pulmonary artery pressure is borderline elevated with the RVSP at 27.0 mmHg. Systemic Veins: The inferior vena cava appears to be of normal size, IVC inspiratory collapse greater than 50%.  CONCLUSIONS:  1. The left ventricular systolic function is normal, with a visually estimated ejection fraction of 55-60%.  2. Spectral Doppler shows an impaired relaxation pattern of left ventricular diastolic filling.  3. There is normal right ventricular global systolic function.  4. The left atrium is mildly dilated.  5. Moderate mitral valve regurgitation.  6. There is transcatheter aortic valve replacement.  7. Echo findings are consistent with normal aortic valve prosthesis structure and function. QUANTITATIVE DATA SUMMARY:  2D MEASUREMENTS:          Normal Ranges: Ao Root d:       2.50 cm  (2.0-3.7cm) LVEDV Index:     73 ml/m2  LV SYSTOLIC FUNCTION BY 2D PLANIMETRY (MOD):                      Normal Ranges: EF-A4C View:    46 % (>=55%) EF-A2C View:    51 % EF-Biplane:     49 % EF-Visual:      58 % LV EF Reported: 58 %  LV DIASTOLIC FUNCTION:             Normal Ranges: MV Peak E:             1.01 m/s    (0.7-1.2 m/s) MV Peak A:             1.41 m/s    (0.42-0.7 m/s) E/A Ratio:             0.72        (1.0-2.2) MV e'                  0.095 m/s   (>8.0) MV lateral e'          0.10 m/s MV medial e'           0.09 m/s MV A Dur:              127.00 msec E/e' Ratio:            10.61       (<8.0) MV DT:                 174 msec    (150-240 msec)  MITRAL VALVE:          Normal Ranges: MV DT:        174 msec  (150-240msec)  MITRAL INSUFFICIENCY:             Normal Ranges: PISA Radius:          0.5 cm MR VTI:               98.80 cm MR Vmax:              339.00 cm/s  AORTIC VALVE:                      Normal Ranges: AoV Vmax:                2.32 m/s  (<=1.7m/s) AoV Peak P.5 mmHg (<20mmHg) AoV Mean P.0 mmHg (1.7-11.5mmHg) LVOT Max Josep:            1.26 m/s  (<=1.1m/s) AoV VTI:                 46.20 cm  (18-25cm) LVOT VTI:                27.10 cm LVOT Diameter:           2.30 cm   (1.8-2.4cm) AoV Area, VTI:           2.44 cm2  (2.5-5.5cm2) AoV Area,Vmax:           2.26 cm2  (2.5-4.5cm2) AoV Dimensionless Index: 0.59  RIGHT VENTRICLE: TAPSE: 25.0 mm RV s'  0.14 m/s  TRICUSPID VALVE/RVSP:          Normal Ranges: Peak TR Velocity:     2.45 m/s RV Syst Pressure:     27 mmHg  (< 30mmHg) IVC Diam:             1.82 cm  [id number] Eamon Kirby MD Electronically signed on 2024 at 6:58:40 PM  ** Final **     Transthoracic Echo (TTE) Complete    Result Date: 2023   [facility], [address] [place] [address]              Tel [phone] and Fax [phone] TRANSTHORACIC ECHOCARDIOGRAM REPORT  Patient Name:      MELISSA Kelly Physician:    [id number] Eamon Kirby MD Study Date:        2023           Ordering Provider:    [id number] LEANNA ANDERSON MRN/PID:           [id number]             Fellow: Accession#:        [id number]         Nurse: Date of Birth/Age: 1933      Sonographer:          Wilver Sun RDCS                    years Gender:            M                    Additional Staff: Height:            180.34 cm            Admit Date:           2023 Weight:            73.48 kg             Admission Status:     Inpatient -                                                               Routine BSA:                1.93 m2              Encounter#:           [id number]                                         Department Location:  [facility]                                                               Department Blood Pressure: 153 /64 mmHg Study Type:    TRANSTHORACIC ECHO (TTE) COMPLETE Diagnosis/ICD: Chronic diastolic (congestive) heart failure (CHF)-I50.32 Indication:    Congestive Heart Failure CPT Code:      Echo Complete w Full Doppler-[id number] Patient History: Pertinent History: HTN, HLD, CVA, AS, CHF. Study Detail: The following Echo studies were performed: 2D, M-Mode, Doppler and               color flow.  PHYSICIAN INTERPRETATION: Left Ventricle: The left ventricular systolic function is moderately decreased, with an estimated ejection fraction of 35-40%. Wall motion is abnormal. The left ventricular cavity size is mildly dilated. There is mild concentric left ventricular hypertrophy. Findings are consistent with ischemic cardiomyopathy. Spectral Doppler shows an impaired relaxation pattern of left ventricular diastolic filling. LV Wall Scoring: The basal and mid inferior wall and basal and mid inferolateral wall are akinetic. The basal and mid anterolateral wall and apical inferior segment are hypokinetic. All remaining scored segments are normal. Left Atrium: The left atrium is normal in size. Right Ventricle: The right ventricle is normal in size. There is normal right ventricular global systolic function. Right Atrium: The right atrium is normal in size. Aortic Valve: The aortic valve is probably trileaflet. There is moderate to severe aortic valve cusp calcification. There is evidence of severe aortic valve stenosis. There is mild aortic valve regurgitation. The peak instantaneous gradient of the aortic valve is 59.6 mmHg. The mean gradient of the aortic valve is 33.0 mmHg. Mitral Valve: The mitral valve is mildly thickened. There is moderate mitral annular calcification. There is moderate mitral valve  regurgitation which is eccentrically directed. 2 MR jets : one anteriorly and one posteriorly directed. Tricuspid Valve: The tricuspid valve is structurally normal. There is mild tricuspid regurgitation. Pulmonic Valve: The pulmonic valve is structurally normal. There is mild pulmonic valve regurgitation. Pericardium: There is no pericardial effusion noted. Aorta: The aortic root is normal. Pulmonary Artery: The tricuspid regurgitant velocity is 2.74 m/s, and with an estimated right atrial pressure of 3 mmHg, the estimated pulmonary artery pressure is mildly elevated with the RVSP at 33.0 mmHg. Pulmonary Veins: The pulmonary veins appear normal and return normally to the left atrium. Systemic Veins: The inferior vena cava appears to be of normal size. There is IVC inspiratory collapse greater than 50%.  CONCLUSIONS:  1. Left ventricular systolic function is moderately decreased with a 35-40% estimated ejection fraction.  2. Basal and mid inferior wall, basal and mid inferolateral wall, basal and mid anterolateral wall, and apical inferior segment are abnormal.  3. Spectral Doppler shows an impaired relaxation pattern of left ventricular diastolic filling.  4. There is ischemic cardiomyopathy.  5. 2 MR jets : one anteriorly and one posteriorly directed.  6. There is moderate mitral annular calcification.  7. Moderate mitral valve regurgitation.  8. Severe aortic valve stenosis.  9. There is moderate to severe aortic valve cusp calcification. 10. Mild aortic valve regurgitation. 11. Mild pulmonary HTN, estimated CVP is normal. QUANTITATIVE DATA SUMMARY: 2D MEASUREMENTS:                    Normal Ranges: Ao Root d: 3.00 cm (2.0-3.7cm) LA VOLUME:                               Normal Ranges: LA Vol A4C:        50.8 ml    (22+/-6mL/m2) LA Vol A2C:        55.8 ml LA Vol BP:         54.2 ml LA Vol Index A4C:  26.4ml/m2 LA Vol Index A2C:  28.9 ml/m2 LA Vol Index BP:   28.1 ml/m2 LA Area A4C:       17.8 cm2 LA Area A2C:        19.0 cm2 LA Major Axis A4C: 5.3 cm LA Major Axis A2C: 5.5 cm RA VOLUME BY A/L METHOD:                               Normal Ranges: RA Vol A4C:        31.4 ml    (8.3-19.5ml) RA Vol Index A4C:  16.3 ml/m2 RA Area A4C:       12.6 cm2 RA Major Axis A4C: 4.3 cm AORTA MEASUREMENTS:                    Normal Ranges: Asc Ao, d: 3.00 cm (2.1-3.4cm) LV SYSTOLIC FUNCTION BY 2D PLANIMETRY (MOD):                     Normal Ranges: EF-A4C View: 48.5 % (>=55%) EF-A2C View: 52.0 % EF-Biplane:  50.4 % LV DIASTOLIC FUNCTION:                               Normal Ranges: MV Peak E:        0.92 m/s    (0.7-1.2 m/s) MV Peak A:        1.47 m/s    (0.42-0.7 m/s) E/A Ratio:        0.63        (1.0-2.2) MV e'             0.06 m/s    (>8.0) MV lateral e'     0.07 m/s MV medial e'      0.06 m/s E/e' Ratio:       14.18       (<8.0) PulmV Sys Josep:    56.20 cm/s PulmV Cohen Josep:   32.10 cm/s PulmV S/D Josep:    1.80 PulmV A Revs Josep: 58.60 cm/s PulmV A Revs Dur: 123.00 msec MITRAL VALVE:                 Normal Ranges: MV DT: 175 msec (150-240msec) MITRAL INSUFFICIENCY:                           Normal Ranges: PISA Radius:  0.7 cm MR VTI:       198.00 cm MR Vmax:      621.00 cm/s MR Alias Josep: 38.5 cm/s MR Volume:    37.79 ml MR Flow Rt:   118.53 ml/s MR EROA:      0.19 cm2 AORTIC VALVE:                                    Normal Ranges: AoV Vmax:                3.86 m/s  (<=1.7m/s) AoV Peak P.6 mmHg (<20mmHg) AoV Mean P.0 mmHg (1.7-11.5mmHg) LVOT Max Josep:            1.10 m/s  (<=1.1m/s) AoV VTI:                 87.10 cm  (18-25cm) LVOT VTI:                22.30 cm LVOT Diameter:           2.00 cm   (1.8-2.4cm) AoV Area, VTI:           0.80 cm2  (2.5-5.5cm2) AoV Area,Vmax:           0.90 cm2  (2.5-4.5cm2) AoV Dimensionless Index: 0.26  RIGHT VENTRICLE: RV Basal 2.60 cm RV Mid   1.20 cm RV Major 6.4 cm TAPSE:   24.8 mm RV s'    0.11 m/s TRICUSPID VALVE/RVSP:                             Normal Ranges: Peak TR Velocity:  2.74 m/s RV Syst Pressure: 33.0 mmHg (< 30mmHg) IVC Diam:         1.80 cm Pulmonary Veins: PulmV A Revs Dur: 123.00 msec PulmV A Revs Josep: 58.60 cm/s PulmV Cohen Josep:   32.10 cm/s PulmV S/D Josep:    1.80 PulmV Sys Josep:    56.20 cm/s  [id number] Eamon Kirby MD Electronically signed on 11/26/2023 at 3:16:08 PM  Wall Scoring  ** Final **         Assessment/Plan   Bronchitis  -CTA chest showed bronchitis, mucous plugging  -antibiotics  -Steroids  -bronchodilators  -Bronchial hygiene  -oxygen support  -sputum culture     2. Chronic diastolic/systolic CHF  -->331  -CTA of the chest negative for effusions or edema  -No significant edema  -I reviewed the Echocardiogram as above  -Strict I & Os  -Daily weights  -2gm na diet  -Restart lasix 40mg PO daily    3. Recent Pulmonary emboli  -CTA of the chest shows resolution of PE  -Cont Eliquis     4. S/p TAVR  -March 2024 at [facility]  -Echocardiogram reviewed as above     5. Hypertension  -stable  -2gm na diet  -cont meds  -monitor     6. Hyperlipidemia  -Cont statin      VELMA Koch-CNP

## 2024-11-06 NOTE — PROGRESS NOTES
11/06/24 1252   Discharge Planning   Living Arrangements Spouse/significant other   Support Systems Spouse/significant other;Children;Family members   Assistance Needed Spoke with wife and patient at bedside.  Patient lives with his wife and their dog in a 2 story house-utilizing the first floor only.  Patient says he is independent with ADLS/IADLS.  Neither one drives but their local 4 children/daughter in law assist as needed. Wife does some cooking.  He is on room air, uses a nebulizer.  No DME.  Patient is hard of hearing.   Type of Residence Private residence   Number of Stairs to Enter Residence 4   Number of Stairs Within Residence 12   Do you have animals or pets at home? Yes   Type of Animals or Pets dog   Home or Post Acute Services None   Expected Discharge Disposition Home   Does the patient need discharge transport arranged? No  (family)     Address, pharmacy, and PCP (Dr. Keily Sage) confirmed.      DC PLAN:  Home

## 2024-11-06 NOTE — DISCHARGE INSTR - OTHER ORDERS
Thank you for choosing [facility] for your Health Care needs.  Also, thank you for allowing us to take you and your families preferences into account when determining your discharge plan.  Stay well!     You may receive a survey in the mail within the next couple weeks. Please take the time to complete it and return it. Your input is ALWAYS important to us. Thank you!  Your Care Transition Team - Fior Rosenberg  & Sarah      For questions about your medications listed on your discharge instructions, please call the Nurses Station at [phone].

## 2024-11-06 NOTE — CARE PLAN
The patient's goals for the shift include to get better    The clinical goals for the shift include oxygen level will remain greater than 90% this shift    Patient oxygen  remains at 3L this afternoon. After this morning patient with less wheezing and coughing resting comfortably this afternoon with wife at bedside

## 2024-11-07 ENCOUNTER — APPOINTMENT (OUTPATIENT)
Dept: CARDIOLOGY | Facility: HOSPITAL | Age: 89
End: 2024-11-07
Payer: MEDICARE

## 2024-11-07 LAB
ANION GAP SERPL CALC-SCNC: 15 MMOL/L (ref 10–20)
ATRIAL RATE: 93 BPM
BUN SERPL-MCNC: 37 MG/DL (ref 6–23)
CALCIUM SERPL-MCNC: 8.9 MG/DL (ref 8.6–10.3)
CHLORIDE SERPL-SCNC: 100 MMOL/L (ref 98–107)
CO2 SERPL-SCNC: 24 MMOL/L (ref 21–32)
CREAT SERPL-MCNC: 1.37 MG/DL (ref 0.5–1.3)
EGFRCR SERPLBLD CKD-EPI 2021: 49 ML/MIN/1.73M*2
ERYTHROCYTE [DISTWIDTH] IN BLOOD BY AUTOMATED COUNT: 13.9 % (ref 11.5–14.5)
GLUCOSE SERPL-MCNC: 182 MG/DL (ref 74–99)
HCT VFR BLD AUTO: 39.8 % (ref 41–52)
HGB BLD-MCNC: 13 G/DL (ref 13.5–17.5)
MCH RBC QN AUTO: 29.5 PG (ref 26–34)
MCHC RBC AUTO-ENTMCNC: 32.7 G/DL (ref 32–36)
MCV RBC AUTO: 91 FL (ref 80–100)
NRBC BLD-RTO: 0 /100 WBCS (ref 0–0)
P AXIS: 53 DEGREES
P OFFSET: 195 MS
P ONSET: 133 MS
PLATELET # BLD AUTO: 149 X10*3/UL (ref 150–450)
POTASSIUM SERPL-SCNC: 4.5 MMOL/L (ref 3.5–5.3)
PR INTERVAL: 176 MS
Q ONSET: 221 MS
QRS COUNT: 15 BEATS
QRS DURATION: 98 MS
QT INTERVAL: 340 MS
QTC CALCULATION(BAZETT): 427 MS
QTC FREDERICIA: 396 MS
R AXIS: -25 DEGREES
RBC # BLD AUTO: 4.4 X10*6/UL (ref 4.5–5.9)
SODIUM SERPL-SCNC: 134 MMOL/L (ref 136–145)
T AXIS: 55 DEGREES
T OFFSET: 391 MS
VENTRICULAR RATE: 95 BPM
WBC # BLD AUTO: 15.2 X10*3/UL (ref 4.4–11.3)

## 2024-11-07 PROCEDURE — 2500000001 HC RX 250 WO HCPCS SELF ADMINISTERED DRUGS (ALT 637 FOR MEDICARE OP): Mod: IPSPLIT | Performed by: NURSE PRACTITIONER

## 2024-11-07 PROCEDURE — 85027 COMPLETE CBC AUTOMATED: CPT | Mod: IPSPLIT | Performed by: NURSE PRACTITIONER

## 2024-11-07 PROCEDURE — 80048 BASIC METABOLIC PNL TOTAL CA: CPT | Mod: IPSPLIT | Performed by: NURSE PRACTITIONER

## 2024-11-07 PROCEDURE — 2500000005 HC RX 250 GENERAL PHARMACY W/O HCPCS: Mod: IPSPLIT | Performed by: NURSE PRACTITIONER

## 2024-11-07 PROCEDURE — 2500000004 HC RX 250 GENERAL PHARMACY W/ HCPCS (ALT 636 FOR OP/ED): Mod: IPSPLIT | Performed by: NURSE PRACTITIONER

## 2024-11-07 PROCEDURE — 94760 N-INVAS EAR/PLS OXIMETRY 1: CPT | Mod: IPSPLIT

## 2024-11-07 PROCEDURE — 1200000002 HC GENERAL ROOM WITH TELEMETRY DAILY: Mod: IPSPLIT

## 2024-11-07 PROCEDURE — 2500000004 HC RX 250 GENERAL PHARMACY W/ HCPCS (ALT 636 FOR OP/ED): Mod: IPSPLIT

## 2024-11-07 PROCEDURE — 2500000001 HC RX 250 WO HCPCS SELF ADMINISTERED DRUGS (ALT 637 FOR MEDICARE OP): Mod: IPSPLIT

## 2024-11-07 PROCEDURE — 94640 AIRWAY INHALATION TREATMENT: CPT | Mod: IPSPLIT

## 2024-11-07 PROCEDURE — 99232 SBSQ HOSP IP/OBS MODERATE 35: CPT | Performed by: NURSE PRACTITIONER

## 2024-11-07 PROCEDURE — 2500000005 HC RX 250 GENERAL PHARMACY W/O HCPCS: Mod: IPSPLIT | Performed by: INTERNAL MEDICINE

## 2024-11-07 PROCEDURE — 93005 ELECTROCARDIOGRAM TRACING: CPT | Mod: IPSPLIT

## 2024-11-07 PROCEDURE — 2500000002 HC RX 250 W HCPCS SELF ADMINISTERED DRUGS (ALT 637 FOR MEDICARE OP, ALT 636 FOR OP/ED): Mod: IPSPLIT

## 2024-11-07 PROCEDURE — 36415 COLL VENOUS BLD VENIPUNCTURE: CPT | Mod: IPSPLIT | Performed by: NURSE PRACTITIONER

## 2024-11-07 ASSESSMENT — PAIN - FUNCTIONAL ASSESSMENT: PAIN_FUNCTIONAL_ASSESSMENT: 0-10

## 2024-11-07 ASSESSMENT — COGNITIVE AND FUNCTIONAL STATUS - GENERAL
MOBILITY SCORE: 24
DAILY ACTIVITIY SCORE: 24

## 2024-11-07 ASSESSMENT — PAIN SCALES - GENERAL
PAINLEVEL_OUTOF10: 0 - NO PAIN

## 2024-11-07 NOTE — PROGRESS NOTES
Radhames Ken is a 89 y.o. male on day 2 of admission presenting with Acute on chronic congestive heart failure, unspecified heart failure type.      Subjective   Patient assessed at bedside; sitting up in bed. He feels better; still on oxygen 2lpm via NC. Wheezing and conversational dypsnea have improved.       Objective     Last Recorded Vitals  /72   Pulse 96   Temp 36.6 °C (97.9 °F) (Temporal)   Resp 18   Wt 77.4 kg (170 lb 10.2 oz)   SpO2 95%   Intake/Output last 3 Shifts:    Intake/Output Summary (Last 24 hours) at 11/7/2024 1203  Last data filed at 11/6/2024 1800  Gross per 24 hour   Intake 300 ml   Output --   Net 300 ml       Admission Weight  Weight: 71.7 kg (158 lb) (11/05/24 1212)    Daily Weight  11/07/24 : 77.4 kg (170 lb 10.2 oz)    Image Results  ECG 12 lead  Undetermined rhythm  Inferior infarct (cited on or before 22-NOV-2023)  Abnormal ECG  When compared with ECG of 18-SEP-2024 16:36,  Current undetermined rhythm precludes rhythm comparison, needs review  ST no longer depressed in Inferior leads      Physical Exam  Vitals reviewed.   Constitutional:       Appearance: Normal appearance. He is normal weight.   HENT:      Head: Normocephalic and atraumatic.      Right Ear: External ear normal.      Left Ear: External ear normal.      Nose: Nose normal.      Mouth/Throat:      Mouth: Mucous membranes are moist.      Pharynx: Oropharynx is clear.   Eyes:      Pupils: Pupils are equal, round, and reactive to light.   Cardiovascular:      Rate and Rhythm: Normal rate and regular rhythm.      Pulses: Normal pulses.      Heart sounds: Normal heart sounds.   Pulmonary:      Effort: Respiratory distress present.      Breath sounds: Wheezing and rhonchi present.      Comments: Conversational dyspnea, accessory muscle use  Abdominal:      General: Bowel sounds are normal.      Palpations: Abdomen is soft.   Musculoskeletal:         General: Normal range of motion.      Cervical back: Normal range  of motion and neck supple.   Skin:     General: Skin is warm and dry.   Neurological:      General: No focal deficit present.      Mental Status: He is alert. He is disoriented.      Motor: Weakness present.   Psychiatric:         Mood and Affect: Mood normal.         Behavior: Behavior normal.       Relevant Results    Scheduled medications  apixaban, 5 mg, oral, BID  atorvastatin, 40 mg, oral, Nightly  eplerenone, 25 mg, oral, Daily  furosemide, 40 mg, oral, Daily  guaiFENesin, 1,200 mg, oral, BID  ipratropium-albuteroL, 3 mL, nebulization, 4x daily  methylPREDNISolone sodium succinate (PF), 40 mg, intravenous, q8h  metoprolol succinate XL, 12.5 mg, oral, Daily  oxygen, , inhalation, Continuous - Inhalation      Continuous medications     PRN medications  PRN medications: acetaminophen **OR** [DISCONTINUED] acetaminophen **OR** [DISCONTINUED] acetaminophen, albuterol, melatonin, [DISCONTINUED] ondansetron **OR** ondansetron, polyethylene glycol    Results for orders placed or performed during the hospital encounter of 11/05/24 (from the past 24 hours)   Basic Metabolic Panel   Result Value Ref Range    Glucose 182 (H) 74 - 99 mg/dL    Sodium 134 (L) 136 - 145 mmol/L    Potassium 4.5 3.5 - 5.3 mmol/L    Chloride 100 98 - 107 mmol/L    Bicarbonate 24 21 - 32 mmol/L    Anion Gap 15 10 - 20 mmol/L    Urea Nitrogen 37 (H) 6 - 23 mg/dL    Creatinine 1.37 (H) 0.50 - 1.30 mg/dL    eGFR 49 (L) >60 mL/min/1.73m*2    Calcium 8.9 8.6 - 10.3 mg/dL   CBC   Result Value Ref Range    WBC 15.2 (H) 4.4 - 11.3 x10*3/uL    nRBC 0.0 0.0 - 0.0 /100 WBCs    RBC 4.40 (L) 4.50 - 5.90 x10*6/uL    Hemoglobin 13.0 (L) 13.5 - 17.5 g/dL    Hematocrit 39.8 (L) 41.0 - 52.0 %    MCV 91 80 - 100 fL    MCH 29.5 26.0 - 34.0 pg    MCHC 32.7 32.0 - 36.0 g/dL    RDW 13.9 11.5 - 14.5 %    Platelets 149 (L) 150 - 450 x10*3/uL   ECG 12 lead   Result Value Ref Range    Ventricular Rate 95 BPM    Atrial Rate 93 BPM    RI Interval 176 ms    QRS Duration 98  ms    QT Interval 340 ms    QTC Calculation(Bazett) 427 ms    P Axis 53 degrees    R Axis -25 degrees    T Axis 55 degrees    QRS Count 15 beats    Q Onset 221 ms    P Onset 133 ms    P Offset 195 ms    T Offset 391 ms    QTC Fredericia 396 ms                   Assessment/Plan   This patient currently has cardiac telemetry ordered; if you would like to modify or discontinue the telemetry order, click here to go to the orders activity to modify/discontinue the order.    Assessment & Plan  Acute on chronic congestive heart failure, unspecified heart failure type    HTN (hypertension)    Hyperlipemia    COPD exacerbation (Multi)    Coronary artery disease involving native coronary artery of native heart without angina pectoris    Dyspnea, unspecified type    Chronic kidney disease, stage 3 unspecified (Multi)    History of pulmonary embolism    Acute on chronic diastolic heart failure  Essential HTN (hypertension)  Hypercholesteremia  ASHD  -LR 1,000 ml IV given in ED  - > 331  -Magnesium Ordered   -last echo 9/13/24: normal LVSF with an EF of 55-60% with impaired relaxation pattern of LVDF, moderate MR  -continue Metoprolol succinate 12.5 mg daily, atorvastatin 40 mg hs, eplerenone 25 mg daily,  -continue furosemide 40 mg daily  -Bumex 1 g IV given in ED  -Cardiology consult, appreciate recs   -cardiac monitoring via telemetry  -continue apixaban 5 mg BID  -daily weight  -I&O     COPD exacerbation (Multi)  Acute Hypoxic respiratory failure  -Covid/Flu Negative  -WBC 7.8  -Lactate 1.8  -Blood cultures pending  -Respiratory Culture if Able  -Procalcitonin 0.06  -CTA Chest: No acute PE. Resolution of prior right upper lobar PE.   Mild diffuse bronchitis with multifocal mucous plugging. Fluid within  the esophagus with increased aspiration risk.  -completed azithromycin 500 mg Daily (Day 3)  -continue DuoNebs q6h  -continue Solu Medrol 40 mg IV Q 12 hours  -continue guaifenesin 1,200 mg BID  -supplemental oxygen to  keep SpO2 > 90%  -Home O2 RA  -Currently on 2L NC  -RT for bronchial hygiene     Acute on Chronic kidney disease, stage 3 unspecified (Multi)  -BUN/Creat/GFR  25/1.25; today 37/1.37  -LR 1,000 ml IV given in ED     History of pulmonary embolism  -continue apixban 5 mg BID     DVT Prophylaxis:   -continue apixaban 5 mg bid      Code Status: Full Code     Disposition: Pt requires inpatient stay at this time.              Ernestine Grubbs, APRN-CNP

## 2024-11-07 NOTE — PROGRESS NOTES
11/07/24 1124   Discharge Planning   Assistance Needed Spoke with wife and patient at bedside. Patient lives with his wife and their dog in a 2 story house-utilizing the first floor only. Patient says he is independent with ADLS/IADLS. Neither one drives but their local 4 children/daughter in law assist as needed. Wife does some cooking. He is on room air, uses a nebulizer. No DME. Patient is hard of hearing.   Home or Post Acute Services None   Expected Discharge Disposition Home   Does the patient need discharge transport arranged? No  (family (neither wife or patient drive))     Per IDT rounds, Sputum culture pending, weaning down oxygen need-currently 2L and baseline is room air.   DC PLAN:  Home

## 2024-11-07 NOTE — CONSULTS
Nutrition Diet Education  Dietitian cory ([facility] education)     Visited with the patient and his wife at bedside. Both are hard of hearing. A low-sodium diet was reviewed with them, focusing on key points for reducing hidden sources of salt. Provided a handout to the wife, as the patient mentioned she manages their meals and his vision has declined. Both confirmed they do not add salt to their food. Reviewed common foods where salt can be hidden, ensuring they understood as well as possible. Both were very friendly. Due to hearing difficulties, it's uncertain if they fully grasped all details; however, contact information was provided should they have questions at home. They shared that their daughter is arriving from [place] tomorrow.    Education Documentation  Nutrition Care Manual, taught by Marilee Miller RDN, LD at 11/7/2024  5:32 PM.  Learner: Significant Other, Patient  Readiness: Acceptance  Method: Explanation, Handout  Response: Needs Reinforcement      Follow Up:     Time Spent (min): 20 minutes  Last Date of Nutrition Visit: 11/07/24  Nutrition Follow-Up Needed?: Dietitian to reassess per policy

## 2024-11-07 NOTE — CARE PLAN
The patient's goals for the shift include to get better    The clinical goals for the shift include pt will be free from injury during shift    Over the shift, the patient was free from injury. Pt spouse stayed in recliner with patient. Pt has short term memory loss. O2 per NC at 2L. Medicated with tylenol x 1 during shift. Pt denied CP. Pt remained SR on tele. Pt up with minimum 1 person assist to  BSC. Pt did not sleep well during shift.

## 2024-11-08 LAB
ANION GAP SERPL CALC-SCNC: 8 MMOL/L (ref 10–20)
BUN SERPL-MCNC: 34 MG/DL (ref 6–23)
CALCIUM SERPL-MCNC: 8.2 MG/DL (ref 8.6–10.3)
CHLORIDE SERPL-SCNC: 104 MMOL/L (ref 98–107)
CO2 SERPL-SCNC: 28 MMOL/L (ref 21–32)
CREAT SERPL-MCNC: 1.23 MG/DL (ref 0.5–1.3)
EGFRCR SERPLBLD CKD-EPI 2021: 56 ML/MIN/1.73M*2
ERYTHROCYTE [DISTWIDTH] IN BLOOD BY AUTOMATED COUNT: 13.8 % (ref 11.5–14.5)
GLUCOSE SERPL-MCNC: 161 MG/DL (ref 74–99)
HCT VFR BLD AUTO: 35.2 % (ref 41–52)
HGB BLD-MCNC: 11.5 G/DL (ref 13.5–17.5)
MCH RBC QN AUTO: 29.9 PG (ref 26–34)
MCHC RBC AUTO-ENTMCNC: 32.7 G/DL (ref 32–36)
MCV RBC AUTO: 91 FL (ref 80–100)
NRBC BLD-RTO: 0 /100 WBCS (ref 0–0)
PLATELET # BLD AUTO: 112 X10*3/UL (ref 150–450)
POTASSIUM SERPL-SCNC: 4.7 MMOL/L (ref 3.5–5.3)
RBC # BLD AUTO: 3.85 X10*6/UL (ref 4.5–5.9)
SODIUM SERPL-SCNC: 135 MMOL/L (ref 136–145)
WBC # BLD AUTO: 7.9 X10*3/UL (ref 4.4–11.3)

## 2024-11-08 PROCEDURE — 94760 N-INVAS EAR/PLS OXIMETRY 1: CPT | Mod: IPSPLIT

## 2024-11-08 PROCEDURE — 2500000004 HC RX 250 GENERAL PHARMACY W/ HCPCS (ALT 636 FOR OP/ED): Mod: IPSPLIT | Performed by: NURSE PRACTITIONER

## 2024-11-08 PROCEDURE — 85027 COMPLETE CBC AUTOMATED: CPT | Mod: IPSPLIT | Performed by: NURSE PRACTITIONER

## 2024-11-08 PROCEDURE — 97129 THER IVNTJ 1ST 15 MIN: CPT | Mod: GO,IPSPLIT | Performed by: OCCUPATIONAL THERAPIST

## 2024-11-08 PROCEDURE — 80048 BASIC METABOLIC PNL TOTAL CA: CPT | Mod: IPSPLIT | Performed by: NURSE PRACTITIONER

## 2024-11-08 PROCEDURE — 97130 THER IVNTJ EA ADDL 15 MIN: CPT | Mod: GO,IPSPLIT | Performed by: OCCUPATIONAL THERAPIST

## 2024-11-08 PROCEDURE — 99233 SBSQ HOSP IP/OBS HIGH 50: CPT

## 2024-11-08 PROCEDURE — 1200000002 HC GENERAL ROOM WITH TELEMETRY DAILY: Mod: IPSPLIT

## 2024-11-08 PROCEDURE — 36415 COLL VENOUS BLD VENIPUNCTURE: CPT | Mod: IPSPLIT | Performed by: NURSE PRACTITIONER

## 2024-11-08 PROCEDURE — 2500000001 HC RX 250 WO HCPCS SELF ADMINISTERED DRUGS (ALT 637 FOR MEDICARE OP): Mod: IPSPLIT | Performed by: NURSE PRACTITIONER

## 2024-11-08 PROCEDURE — 2500000004 HC RX 250 GENERAL PHARMACY W/ HCPCS (ALT 636 FOR OP/ED): Mod: IPSPLIT

## 2024-11-08 PROCEDURE — 2500000005 HC RX 250 GENERAL PHARMACY W/O HCPCS: Mod: IPSPLIT | Performed by: INTERNAL MEDICINE

## 2024-11-08 PROCEDURE — 97165 OT EVAL LOW COMPLEX 30 MIN: CPT | Mod: GO,IPSPLIT | Performed by: OCCUPATIONAL THERAPIST

## 2024-11-08 PROCEDURE — 2500000002 HC RX 250 W HCPCS SELF ADMINISTERED DRUGS (ALT 637 FOR MEDICARE OP, ALT 636 FOR OP/ED): Mod: IPSPLIT | Performed by: INTERNAL MEDICINE

## 2024-11-08 PROCEDURE — 94640 AIRWAY INHALATION TREATMENT: CPT | Mod: IPSPLIT

## 2024-11-08 PROCEDURE — 2500000001 HC RX 250 WO HCPCS SELF ADMINISTERED DRUGS (ALT 637 FOR MEDICARE OP): Mod: IPSPLIT

## 2024-11-08 PROCEDURE — 2500000004 HC RX 250 GENERAL PHARMACY W/ HCPCS (ALT 636 FOR OP/ED): Mod: JZ,IPSPLIT | Performed by: INTERNAL MEDICINE

## 2024-11-08 RX ORDER — MEROPENEM AND SODIUM CHLORIDE 1 G/50ML
1 INJECTION, SOLUTION INTRAVENOUS EVERY 12 HOURS
Status: DISCONTINUED | OUTPATIENT
Start: 2024-11-08 | End: 2024-11-09 | Stop reason: HOSPADM

## 2024-11-08 RX ORDER — AMOXICILLIN AND CLAVULANATE POTASSIUM 875; 125 MG/1; MG/1
1 TABLET, FILM COATED ORAL EVERY 12 HOURS SCHEDULED
Status: DISCONTINUED | OUTPATIENT
Start: 2024-11-08 | End: 2024-11-08

## 2024-11-08 RX ORDER — IPRATROPIUM BROMIDE AND ALBUTEROL SULFATE 2.5; .5 MG/3ML; MG/3ML
3 SOLUTION RESPIRATORY (INHALATION) 3 TIMES DAILY
Status: DISCONTINUED | OUTPATIENT
Start: 2024-11-08 | End: 2024-11-09 | Stop reason: HOSPADM

## 2024-11-08 ASSESSMENT — COGNITIVE AND FUNCTIONAL STATUS - GENERAL
MOVING FROM LYING ON BACK TO SITTING ON SIDE OF FLAT BED WITH BEDRAILS: A LITTLE
DRESSING REGULAR LOWER BODY CLOTHING: A LITTLE
HELP NEEDED FOR BATHING: A LITTLE
PERSONAL GROOMING: A LITTLE
CLIMB 3 TO 5 STEPS WITH RAILING: A LITTLE
STANDING UP FROM CHAIR USING ARMS: A LITTLE
TOILETING: A LITTLE
DAILY ACTIVITIY SCORE: 24
WALKING IN HOSPITAL ROOM: A LITTLE
MOBILITY SCORE: 18
MOVING TO AND FROM BED TO CHAIR: A LITTLE
TURNING FROM BACK TO SIDE WHILE IN FLAT BAD: A LITTLE
DAILY ACTIVITIY SCORE: 18
EATING MEALS: A LITTLE
DRESSING REGULAR UPPER BODY CLOTHING: A LITTLE

## 2024-11-08 ASSESSMENT — ENCOUNTER SYMPTOMS
CHILLS: 0
FEVER: 0
COUGH: 1
SHORTNESS OF BREATH: 1

## 2024-11-08 ASSESSMENT — PAIN SCALES - GENERAL
PAINLEVEL_OUTOF10: 0 - NO PAIN

## 2024-11-08 ASSESSMENT — ACTIVITIES OF DAILY LIVING (ADL)
ADL_ASSISTANCE: INDEPENDENT
BATHING_ASSISTANCE: INDEPENDENT

## 2024-11-08 ASSESSMENT — PAIN - FUNCTIONAL ASSESSMENT: PAIN_FUNCTIONAL_ASSESSMENT: 0-10

## 2024-11-08 NOTE — PROGRESS NOTES
Occupational Therapy    Evaluation    Patient Name: Radhames Ken  MRN: [id number]  Department: [facility]  Room: [address] [place], [place] [address]  Today's Date: 11/8/2024  Time Calculation  Start Time: 1229  Stop Time: 1311  Time Calculation (min): 42 min    Assessment  IP OT Assessment  OT Assessment: Pt. is a 89 y.o. male referred to occupational therapy for imapaired self-care 2/2 admisison for cognitive deficits and acute on chronic CHF. Pt. displays MOD cognitive deficit and would benefit from 24 hr supervision for safety, assistance with medications, and medical decision making. No acute OT needs at this time.  Prognosis: Good  Barriers to Discharge: None  Evaluation/Treatment Tolerance: Patient tolerated treatment well  Medical Staff Made Aware: Yes  End of Session Communication: Bedside nurse  End of Session Patient Position: Bed, 2 rail up, Alarm on  Plan:  No Skilled OT: Safe to return home (24 hr supervision from family)  OT Discharge Recommendations: 24 hr supervision due to cognition  OT Recommended Transfer Status: Stand by assist, Assist of 1  OT - OK to Discharge: Yes (Based on completed evaluation and care plan recommendations, no barriers to discharge to next site of care)    Subjective   Current Problem:  1. Acute on chronic congestive heart failure, unspecified heart failure type        2. Hypoxia          General:  General  Reason for Referral: impaired cognition d/t admission for CHF  Referred By: VELMA Harrell-CNP  Past Medical History Relevant to Rehab: CHF, Covid 19, eczema, elevated PSA, Heart disease, HL, HTN, BPH, mitral valve insufficiency, arthritis, HLD, seizues, vomiting, stroke, visual impairment  Family/Caregiver Present: Yes (spouse)  Prior to Session Communication: Bedside nurse  Patient Position Received: Bed, 2 rail up, Alarm off, not on at start of session  General Comment: too molina, james, IV, west  Precautions:  Hearing/Visual Limitations: hearing loss, glasses  Medical Precautions:  Fall precautions    Vital Sign (Past 2hrs)        Date/Time Vitals Session Patient Position Pulse Resp SpO2 BP MAP (mmHg)    11/08/24 13:26:11 --  --  81  16  92 %  139/71  94                   Pain:  Pain Assessment  Pain Assessment: 0-10  0-10 (Numeric) Pain Score: 0 - No pain    Objective   Cognition:  Overall Cognitive Status: Impaired at baseline  Arousal/Alertness: Appropriate responses to stimuli  Orientation Level: Disoriented to place, Disoriented to time, Disoriented to situation  Cognition Test Scores  Cognition Tests: Cognition Test Performed (MOCA - Pt. displayed MOD cognitive impairment scoring a 13/30. Pt. not oriented to date, place, month or year despite being cued at beginning of session. Most directions had to be repeated a second time for comprehension. OT recommends 24 hr supervision.)  Home Living:  Type of Home: House  Lives With: Spouse, Adult children  Home Adaptive Equipment: Cane  Home Layout: Two level, Able to live on main level with bedroom/bathroom   Prior Function:  Level of [place]: Independent with ADLs and functional transfers, Needs assistance with homemaking  Receives Help From: Family  ADL Assistance: Independent  Homemaking Assistance: Needs assistance (lives with son and spouse who provide assistance.)  Ambulatory Assistance: Independent  IADL History:  Homemaking Responsibilities: No  ADL:  Eating Assistance: Independent (anticipated)  Grooming Assistance: Independent (anticipated)  Bathing Assistance: Independent (anticipated)  UE Dressing Assistance: Independent (anticipated)  LE Dressing Assistance: Independent  Toileting Assistance with Device: Independent (anticipated)  Activity Tolerance:  Endurance: Endurance does not limit participation in activity  Bed Mobility/Transfers: Bed Mobility  Bed Mobility: Yes  Bed Mobility 1  Bed Mobility 1: Supine to sitting  Level of Assistance 1: Modified independent  Bed Mobility 2  Bed Mobility  2: Sitting to supine  Level of  Assistance 2: Modified independent    Transfers  Transfer: Yes  Transfer 1  Transfer From 1: Bed to  Transfer to 1: Toilet  Technique 1: Sit to stand, Stand to sit  Transfer Level of Assistance 1: Modified independent      Functional Mobility:  Functional Mobility  Functional Mobility Performed: Yes  Functional Mobility 1  Surface 1: Level tile  Device 1: No device  Assistance 1: Distant supervision  Sitting Balance:  Static Sitting Balance  Static Sitting-Balance Support: Feet supported  Static Sitting-Level of Assistance: Independent  Dynamic Sitting Balance  Dynamic Sitting-Balance Support: Feet supported  Dynamic Sitting-Level of Assistance: Independent  Dynamic Sitting-Balance: Forward lean  Standing Balance:  Static Standing Balance  Static Standing-Balance Support: No upper extremity supported  Static Standing-Level of Assistance: Independent  Dynamic Standing Balance  Dynamic Standing-Balance Support: No upper extremity supported  Dynamic Standing-Level of Assistance: Distant supervision  Dynamic Standing-Balance: Turning  IADL's:   Homemaking Responsibilities: No  Vision: Vision - Basic Assessment  Current Vision: Wears glasses all the time  Strength:  Strength Comments: BUE: 5/5 grossly throughout  Coordination:  Movements are Fluid and Coordinated: Yes   Hand Function:  Hand Function  Gross Grasp: Functional  Coordination: Functional  Extremities: RUE   RUE : Within Functional Limits and LUE   LUE: Within Functional Limits    Outcome Measures: [facility] Daily Activity  Putting on and taking off regular lower body clothing: None  Bathing (including washing, rinsing, drying): None  Putting on and taking off regular upper body clothing: None  Toileting, which includes using toilet, bedpan or urinal: None  Taking care of personal grooming such as brushing teeth: None  Eating Meals: None  Daily Activity - Total Score: 24     and MoCA  Visuospatial/Executive: 1  Namin  Memory (Score '0' as this is an Unscored  Section): 0  Attention: Read List of Digits: 2  Attention: Read List of Letters: 0  Attention: Serial Sevens: 3  Language: Repeat: 1  Language: Fluency: 1  Abstraction: 1  Delayed Recall: 0  Orientation: 1  Add 1 Point if </=12 yr Education: 1  MOCA Total Score: 13      Education Documentation  ADL Training, taught by Alva Buckley OT at 11/8/2024  3:20 PM.  Learner: Family, Patient  Readiness: Acceptance  Method: Explanation  Response: Verbalizes Understanding  Comment: Edu on POC and DC rec. Edu on cog limitations.    Education Comments  No comments found.      Goals: Not established patient at his baseline for ADL. Anticipate cognitive deficits have been ongoing per spouse who states he lost his memory with stroke in 2016.

## 2024-11-08 NOTE — PROGRESS NOTES
"Radhames Ken is a 89 y.o. male on day 3 of admission presenting with Acute on chronic congestive heart failure, unspecified heart failure type.    Subjective   Patient resting in bed, remains fatigued, and requiring increased oxygen demands.  Patient and wife have significant memory issues and will need evaluated for safety at home alone.  Discussed current treatment plan, states understanding, and agrees.         Objective     Physical Exam  Constitutional:       Appearance: Normal appearance.   HENT:      Head: Normocephalic and atraumatic.      Nose: Nose normal.      Mouth/Throat:      Mouth: Mucous membranes are moist.   Eyes:      Extraocular Movements: Extraocular movements intact.      Pupils: Pupils are equal, round, and reactive to light.   Cardiovascular:      Rate and Rhythm: Normal rate and regular rhythm.      Pulses: Normal pulses.      Heart sounds: Normal heart sounds.   Pulmonary:      Effort: Pulmonary effort is normal.      Comments: Diminished Bases  Abdominal:      General: Bowel sounds are normal.      Palpations: Abdomen is soft.   Musculoskeletal:         General: Normal range of motion.      Cervical back: Normal range of motion.   Skin:     General: Skin is warm and dry.      Capillary Refill: Capillary refill takes less than 2 seconds.   Neurological:      Mental Status: He is alert. Mental status is at baseline.      Comments: AxOx1   Psychiatric:         Mood and Affect: Mood normal.         Behavior: Behavior normal.      Comments: Confused         Last Recorded Vitals  Blood pressure 141/73, pulse 79, temperature 36.7 °C (98.1 °F), temperature source Temporal, resp. rate 16, height 1.803 m (5' 10.98\"), weight 77.4 kg (170 lb 10.2 oz), SpO2 94%.  Intake/Output last 3 Shifts:  I/O last 3 completed shifts:  In: 720 (9.3 mL/kg) [P.O.:720]  Out: - (0 mL/kg)   Weight: 77.4 kg     Relevant Results  Scheduled medications  apixaban, 5 mg, oral, BID  atorvastatin, 40 mg, oral, " Nightly  eplerenone, 25 mg, oral, Daily  furosemide, 40 mg, oral, Daily  guaiFENesin, 1,200 mg, oral, BID  ipratropium-albuteroL, 3 mL, nebulization, TID  methylPREDNISolone sodium succinate (PF), 40 mg, intravenous, q12h  metoprolol succinate XL, 12.5 mg, oral, Daily  oxygen, , inhalation, Continuous - Inhalation      Continuous medications     PRN medications  PRN medications: acetaminophen **OR** [DISCONTINUED] acetaminophen **OR** [DISCONTINUED] acetaminophen, albuterol, melatonin, [DISCONTINUED] ondansetron **OR** ondansetron, polyethylene glycol    ECG 12 lead    Result Date: 11/7/2024  Undetermined rhythm Inferior infarct (cited on or before 22-NOV-2023) Abnormal ECG When compared with ECG of 18-SEP-2024 16:36, Current undetermined rhythm precludes rhythm comparison, needs review ST no longer depressed in Inferior leads    CT angio chest for pulmonary embolism    Result Date: 11/5/2024  Interpreted By:  Jacob Dyson, STUDY: CT ANGIO CHEST FOR PULMONARY EMBOLISM;  11/5/2024 1:33 pm   INDICATION: Signs/Symptoms:SOB.   COMPARISON: CT chest 09/12/2024.   ACCESSION NUMBER(S): [id number]   ORDERING CLINICIAN: RACHEL VASQUEZ   TECHNIQUE: Helical data acquisition of the chest was obtained following intravenous administration of 75 ml of Omnipaque 350. Images were reformatted in coronal and sagittal planes. Axial and coronal MIP images were created and reviewed.   FINDINGS: POTENTIAL LIMITATIONS OF THE STUDY: None   HEART AND VESSELS: No discrete filling defects within the main pulmonary artery or its branches. Previously shown filling defects in the right upper lobar pulmonary arteries are no longer present.   Main pulmonary artery is normal in caliber.   The thoracic aorta is of normal course and caliber with mild vascular calcifications. Grossly similar noncalcified plaques in the descending thoracic aorta.   No significant coronary artery calcifications.The study is not optimized for evaluation of coronary  arteries.   The cardiac chambers are not enlarged. Status post aortic valve replacement.   No evidence of pericardial effusion.   MEDIASTINUM AND LUIS MIGUEL, LOWER NECK AND AXILLA: The visualized thyroid gland is within normal limits.   No evidence of thoracic lymphadenopathy by CT criteria.   Fluid within the esophagus.   LUNGS AND AIRWAYS: There is diffuse bronchial wall thickening throughout all 5 lobes with multifocal mucous plugging. Mild bilateral lobe atelectasis. No pulmonary edema, pleural effusion or pneumothorax. No suspicious nodules identified.   UPPER ABDOMEN: Grossly stable varying-sized hepatic cysts. Mild pneumobilia, likely postprocedural. Bilateral renal cysts. Nonobstructing punctate right upper pole renal calculus.   CHEST WALL AND OSSEOUS STRUCTURES: Mild bilateral gynecomastia. Multilevel degenerative disc disease with bridging osteophytes and superimposed diffuse idiopathic skeletal hyperostosis.       No acute PE. Resolution of prior right upper lobar PE.   Mild diffuse bronchitis with multifocal mucous plugging. Fluid within the esophagus with increased aspiration risk.   MACRO None   Signed by: Jacob Dyson 11/5/2024 2:21 PM Dictation workstation:   [id number]     Results for orders placed or performed during the hospital encounter of 11/05/24 (from the past 24 hours)   Basic Metabolic Panel   Result Value Ref Range    Glucose 161 (H) 74 - 99 mg/dL    Sodium 135 (L) 136 - 145 mmol/L    Potassium 4.7 3.5 - 5.3 mmol/L    Chloride 104 98 - 107 mmol/L    Bicarbonate 28 21 - 32 mmol/L    Anion Gap 8 (L) 10 - 20 mmol/L    Urea Nitrogen 34 (H) 6 - 23 mg/dL    Creatinine 1.23 0.50 - 1.30 mg/dL    eGFR 56 (L) >60 mL/min/1.73m*2    Calcium 8.2 (L) 8.6 - 10.3 mg/dL   CBC   Result Value Ref Range    WBC 7.9 4.4 - 11.3 x10*3/uL    nRBC 0.0 0.0 - 0.0 /100 WBCs    RBC 3.85 (L) 4.50 - 5.90 x10*6/uL    Hemoglobin 11.5 (L) 13.5 - 17.5 g/dL    Hematocrit 35.2 (L) 41.0 - 52.0 %    MCV 91 80 - 100 fL    MCH 29.9  26.0 - 34.0 pg    MCHC 32.7 32.0 - 36.0 g/dL    RDW 13.8 11.5 - 14.5 %    Platelets 112 (L) 150 - 450 x10*3/uL         Assessment/Plan   Assessment & Plan  Acute on chronic congestive heart failure, unspecified heart failure type    HTN (hypertension)    Hyperlipemia    COPD exacerbation (Multi)    Coronary artery disease involving native coronary artery of native heart without angina pectoris    Dyspnea, unspecified type    Chronic kidney disease, stage 3 unspecified (Multi)    History of pulmonary embolism    Principal Problems  #Acute on chronic diastolic heart failure  #Essential HTN (hypertension)  #Hypercholesteremia  #ASHD  -LR 1,000 ml IV given in ED  - > 331  -Magnesium 2.28  -last echo 9/13/24: normal LVSF with an EF of 55-60% with impaired relaxation pattern of LVDF, moderate MR  -continue Metoprolol succinate 12.5 mg daily, atorvastatin 40 mg hs, eplerenone 25 mg daily,  -continue furosemide 40 mg daily  -Bumex 1 g IV given in ED  -Cardiology consult, appreciate recs   -cardiac monitoring via telemetry  -continue apixaban 5 mg BID  -daily weight  -I&O     COPD exacerbation (Multi)  Acute Hypoxic respiratory failure  -Covid/Flu Negative  -WBC 7.8 > 7.9  -Lactate 1.8  -Blood cultures NGTD  -Respiratory Culture (4+) Abundant MALDI Microbe Abnormal    -Procalcitonin 0.06  -CTA Chest: No acute PE. Resolution of prior right upper lobar PE.   Mild diffuse bronchitis with multifocal mucous plugging. Fluid within  the esophagus with increased aspiration risk.  -completed azithromycin 500 mg Daily (Day 3) completed  -continue DuoNebs q6h  -continue Solu Medrol 40 mg IV Q 12 hours  -Merrem 1 g IV Q 12 hours (Day 1)  -continue guaifenesin 1,200 mg BID  -supplemental oxygen to keep SpO2 > 90%  -Home O2 RA  -Currently on 2L NC  -RT for bronchial hygiene   -Infectious Disease Consult, appreciate recs    Acute on Chronic kidney disease, stage 3 unspecified (Multi)  -BUN/Creat/GFR  25/1.25; today 34/1.23  -LR  1,000 ml IV given in ED     History of pulmonary embolism  -continue apixban 5 mg BID     DVT Prophylaxis:   -continue apixaban 5 mg bid      Code Status: Full Code     Fluids: N/A    Nutrition: Cardiac    Code Status: Full Code  Pt requires inpatient stay at this time.  Total accumulated time spent face to face and not face to face preparing to see the patient, obtaining and reviewing separately obtained history; performing a medically appropriate examination and/or evaluation; counseling and educating the patient, family; ordering medications, tests, or procedures; referring and communicating with other health care professionals; documenting clinical information in the patient's medical record; independently interpreting results and communicating the results to the patient, family; and care coordination was 45 minutes.        Mayela Amin, VELMA-CNP

## 2024-11-08 NOTE — ED PROVIDER NOTES
HPI   Chief Complaint   Patient presents with    Shortness of Breath     Pt. Started a few days ago with increased SOB, weakness and MNPC.  Pt. Room air pulse ox currently 93%         HPI        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    Diarrhea, functional    Heart disease     HL (hearing loss)     Hypertension     Other disorders of plasma-protein metabolism, not elsewhere classified 05/30/2017    Serum albumin decreased    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    Personal history of diseases of the skin and subcutaneous tissue 02/18/2019    History of eczema    Personal history of other diseases of male genital organs     History of BPH    Personal history of other diseases of the circulatory system     History of hypertension    Personal history of other diseases of the circulatory system     History of mitral valve insufficiency    Personal history of other diseases of the musculoskeletal system and connective tissue     Personal history of arthritis    Personal history of other endocrine, nutritional and metabolic disease     History of hyperlipidemia    Personal history of other specified conditions 11/09/2018    History of seizure    Personal history of other specified conditions 05/05/2022    History of vomiting    Stroke (Multi)     Visual impairment      Past Surgical History:   Procedure Laterality Date    CARDIAC SURGERY  04/28/2014    Heart Surgery    CHOLECYSTECTOMY  04/28/2014    Cholecystectomy    GALLBLADDER SURGERY  04/28/2014    Gallbladder Surgery    MR  HEAD ANGIO WO IV CONTRAST  9/30/2018    MR HEAD ANGIO WO IV CONTRAST 9/30/2018 [facility]INICAL [facility]GAC[facility]    MR NECK ANGIO WO IV CONTRAST  9/30/2018    MR NECK ANGIO WO IV CONTRAST 9/30/2018 [facility] CLINICAL LEGACY    OTHER SURGICAL HISTORY  04/28/2014    Knee Repair    OTHER SURGICAL HISTORY  04/28/2014    Needle Biopsy Of Prostate    OTHER SURGICAL HISTORY  03/05/2021    Complete colonoscopy    OTHER SURGICAL HISTORY  03/05/2021    Endoscopy    OTHER SURGICAL HISTORY  10/08/2018    Common Bile Duct Stone Removal    PROSTATE SURGERY  10/08/2018    Prostate Surgery     Family History   Problem Relation Name Age of Onset    Heart disease Mother Abiola     Breast cancer Mother Abiola     Lung cancer Father      Cancer Brother Arpit     Cancer Brother Kevin     Cancer Brother Tere     Cancer Brother       Social History     Tobacco Use    Smoking status: Never    Smokeless tobacco: Never   Vaping Use    Vaping status: Never Used   Substance Use Topics    Alcohol use: Not Currently    Drug use: Never       Physical Exam   ED Triage Vitals [11/05/24 1212]   Temp Heart Rate Resp BP   36.4 °C (97.5 °F) 79 (!) 32 125/65      SpO2 Temp Source Heart Rate Source Patient Position   (!) 93 % Temporal Monitor Sitting      BP Location FiO2 (%)     Left arm --       Physical Exam  Vitals and nursing note reviewed.   Constitutional:       General: He is not in acute distress.     Appearance: He is well-developed.   HENT:      Head: Normocephalic and atraumatic.   Eyes:      Conjunctiva/sclera: Conjunctivae normal.   Cardiovascular:      Rate and Rhythm: Normal rate and regular rhythm.      Heart sounds: No murmur heard.  Pulmonary:      Effort: Pulmonary effort is normal. No respiratory distress.      Breath sounds: Decreased breath sounds present.   Abdominal:      Palpations: Abdomen is soft.      Tenderness: There is no abdominal tenderness.   Musculoskeletal:         General: No swelling.      Cervical back: Neck supple.      Right  lower leg: Edema present.      Left lower leg: Edema present.   Skin:     General: Skin is warm and dry.      Capillary Refill: Capillary refill takes less than 2 seconds.   Neurological:      Mental Status: He is alert.   Psychiatric:         Mood and Affect: Mood normal.           ED Course & MDM   ED Course as of 11/08/24 1251   Tue Nov 05, 2024   1215 EKG performed at 1215 showing normal sinus rhythm no ST elevation or depression a few P VCs.  The ventricular rate of 95 [KA]      ED Course User Index  [KA] Edmundo Quach DO         Diagnoses as of 11/08/24 1251   Acute on chronic congestive heart failure, unspecified heart failure type   Hypoxia                 No data recorded     García Coma Scale Score: 15 (11/07/24 0800 : Tatianna Lanza RN)                           Medical Decision Making  89-year-old male presents to the ER with chief complaint shortness of breath.  Patient was worked up in the ED patient is found to have probable  CHF exacerbation leading to his shortness of breath.  Plan is admit the patient for further evaluation treatment.        Procedure  Procedures     Edmundo Quach DO  11/08/24 1251

## 2024-11-08 NOTE — PROGRESS NOTES
Subjective Data:  Feels better No chest pain or dyspnea at rest Less cough still nonproductive    Overnight Events:    None    NSR     Objective Data:  Last Recorded Vitals:  Vitals:    11/07/24 1430 11/07/24 1707 11/07/24 1737 11/07/24 1811   BP:  117/64  137/69   BP Location:  Right arm  Right arm   Patient Position:  Lying  Lying   Pulse: 93 84     Resp: 19 17  17   Temp:  36.8 °C (98.2 °F)  36.9 °C (98.4 °F)   TempSrc:  Temporal  Temporal   SpO2: 97% 93% 95%    Weight:       Height:           Last Labs:  CBC - 11/7/2024:  9:14 AM  15.2 13.0 149    39.8      CMP - 11/7/2024:  9:14 AM  8.9 5.4 18 --- 1.7   _ 3.5 15 129      PTT - No results in last year.  1.9   22.1 _     TROPHS   Date/Time Value Ref Range Status   09/18/2024 04:12 PM 17 0 - 20 ng/L Final   09/11/2024 03:15 PM 18 0 - 20 ng/L Final   09/11/2024 01:35 PM 20 0 - 20 ng/L Final     BNP   Date/Time Value Ref Range Status   11/06/2024 05:27  0 - 99 pg/mL Final   11/05/2024 12:22  0 - 99 pg/mL Final     VLDL   Date/Time Value Ref Range Status   01/05/2022 03:58 PM 22 0 - 40 mg/dL Final   03/05/2021 02:19 PM 30 0 - 40 mg/dL Final   03/03/2020 04:45 PM 23 0 - 40 mg/dL Final      Last I/O:  I/O last 3 completed shifts:  In: 1470 (19 mL/kg) [P.O.:1470]  Out: 150 (1.9 mL/kg) [Urine:150 (0.1 mL/kg/hr)]  Weight: 77.4 kg   Transthoracic Echo (TTE) Complete 11/22/2023    Ejection Fractions:  EF   Date/Time Value Ref Range Status   09/13/2024 02:50 PM 58 %    11/22/2023 03:13 PM 50         Inpatient Medications:  Scheduled medications   Medication Dose Route Frequency    apixaban  5 mg oral BID    atorvastatin  40 mg oral Nightly    eplerenone  25 mg oral Daily    furosemide  40 mg oral Daily    guaiFENesin  1,200 mg oral BID    ipratropium-albuteroL  3 mL nebulization 4x daily    methylPREDNISolone sodium succinate (PF)  40 mg intravenous q12h    metoprolol succinate XL  12.5 mg oral Daily    oxygen   inhalation Continuous - Inhalation     PRN  medications   Medication    acetaminophen    albuterol    melatonin    ondansetron    polyethylene glycol     Continuous Medications   Medication Dose Last Rate       Physical Exam:  Constitutional: Well developed, awake/alert/oriented x3, no distress, alert and cooperative  Eyes: PERRL, EOMI, clear sclera  ENMT: mucous membranes moist, no apparent injury, no lesions seen  Head/Neck: Neck supple, no apparent injury, thyroid without mass or tenderness, No JVD, trachea midline, no bruits  Respiratory/Thorax: Patent airways, CTAB, normal breath sounds with good chest expansion, thorax symmetric  Cardiovascular: Regular, rate and rhythm, no murmurs, 2+ equal pulses of the extremities, normal S 1and S 2  Gastrointestinal: Nondistended, soft, non-tender, no rebound tenderness or guarding, no masses palpable, no organomegaly, +BS, no bruits  Musculoskeletal: ROM intact, no joint swelling, normal strength  Extremities: normal extremities, no cyanosis edema, contusions or wounds, no clubbing  Neurological: alert and oriented x3, intact senses, motor, response and reflexes, normal strength  Lymphatic: No significant lymphadenopathy  Psychological: Appropriate mood and behavior  Skin: Warm and dry, no lesions, no rashes      Assessment/Plan   Bronchitis  -CTA chest showed bronchitis, mucous plugging  -antibiotics  -Steroids  -bronchodilators  -Bronchial hygiene  -oxygen support  -sputum culture     2. Chronic diastolic/systolic CHF  -->331  -CTA of the chest negative for effusions or edema  -No significant edema  -I reviewed the Echocardiogram as above  -Strict I & Os  -Daily weights  -2gm na diet  -RestartED lasix 40mg PO daily     3. Recent Pulmonary emboli  -CTA of the chest shows resolution of PE  -Cont Eliquis     4. S/p TAVR  -March 2024 at [facility]  -Echocardiogram reviewed as above     5. Hypertension  -stable  -2gm na diet  -cont meds  -monitor     6. Hyperlipidemia  -Cont statin  Peripheral IV 11/05/24 20 G  Left;Proximal;Anterior Forearm (Active)   Site Assessment Clean;Intact;Dry 11/07/24 0900   Dressing Status Clean;Dry 11/07/24 0900   Number of days: 2       Code Status:  Full Code    I spent 20 minutes in the professional and overall care of this patient.        Eamon Kirby MD

## 2024-11-08 NOTE — CARE PLAN
The patient's goals for the shift include Patient will remain injury free this shift 11/7/24 1900.    The clinical goals for the shift include Pt's Spo2 will remain 92% or greater on 1L    Over the shift, the patient remained stable and free from injuries. VSS. No c/o pain or S&S of respiratory distress observed or reported. Pt continues on Solumedrol with no adverse reactions. Pt alert and could answer questions appropriately. Tolerated 1L of oxygen via NC.

## 2024-11-08 NOTE — PROGRESS NOTES
11/08/24 1510   Discharge Planning   Living Arrangements Spouse/significant other   Support Systems Spouse/significant other   Assistance Needed caroline Calero lives with patient, assist [facility] ADL's, grocery shops, assists as needed. neice assist everyday 2hrs daily, daughters live close by and assist daily, eloisa Swain staying with patient, then returning to [place] next week. Discussed with daughter safety concerns at home, daughter aware and contacting non medical agencies for private pay services. Resources for private pay agencies given to eloisa Swain.     Plan to discharge to home with no home going needs. Family assists daily, caroline Calero lives with patient 24/7

## 2024-11-08 NOTE — CARE PLAN
The patient's goals for the shift include Patient will remain injury free this shift 11/7/24 1900.    The clinical goals for the shift include to participate in therapy; free from falls/ injury this shift    OOB with therapy.  Wife remains @ bedside.  Oral ATB started.

## 2024-11-08 NOTE — CONSULTS
Inpatient consult to Infectious Diseases  Consult performed by: Dayron Gonzalez MD  Consult ordered by: Mayela Amin, APRN-CNP          Primary MD: Keily Sage DO    Reason For Consult  Positive sputum culture    History Of Present Illness  Radhames Ken is a 89 y.o. male presenting with shortness of breath.  Onset was a couple of days prior to presentation.  He had associated productive cough.  He denies any chest pain.  He came to the hospital for further evaluation and management.  CT of the chest was remarkable for multifocal mucous plugging and fluid within the esophagus.  He had positive sputum culture reported as MALDI microbe.  He was seen with his wife and daughter present.  He and his wife are hard of hearing, and daughter participated in today's process of history taking  He reports interval improvement.  He was placed on Augmentin as empiric.  He denies any fever or chills.  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    Surgical History  He has a past surgical history that includes Gallbladder surgery (04/28/2014); Other surgical history (04/28/2014); Other surgical history (04/28/2014); Cholecystectomy (04/28/2014); Cardiac surgery (04/28/2014); Other surgical history (03/05/2021); Other surgical history (03/05/2021); Other surgical history (10/08/2018); Prostate surgery (10/08/2018); MR angio head wo IV contrast (9/30/2018); and MR angio neck wo IV contrast (9/30/2018).     Social History     Occupational History    Not on file   Tobacco Use    Smoking status: Never    Smokeless tobacco: Never   Vaping Use    Vaping status: Never Used   Substance and Sexual Activity    Alcohol use: Not Currently    Drug use: Never    Sexual activity: Not Currently     Travel History   Travel since 10/08/24    No documented travel since 10/08/24           Family History  Family History   Problem Relation Name Age of Onset    Heart disease Mother Abiola     Breast cancer Mother Abiola     Lung cancer Father      Cancer Brother Arpit     Cancer Brother Kevin     Cancer Brother Tere     Cancer Brother       Allergies  Patient has no known allergies.       There is no immunization history on file for this patient.  Medications  Home medications:  Medications Prior to Admission   Medication Sig Dispense Refill Last Dose/Taking    albuterol (Ventolin HFA) 90 mcg/actuation inhaler Inhale 2 puffs every 4 hours if needed for wheezing or shortness of breath. 18 g 11 11/4/2024 Evening    apixaban (Eliquis) 5 mg tablet Take 1 tablet(5mg) by mouth twice daily as directed 60 tablet 0 11/5/2024 Morning    atorvastatin (Lipitor) 40 mg tablet Take 1 tablet (40 mg) by mouth once daily at bedtime.   11/4/2024 Bedtime    eplerenone (Inspra) 25 mg tablet Take 1 tablet (25 mg) by mouth once daily.   11/5/2024 Morning    furosemide (Lasix) 40  mg tablet Take 1 tablet (40 mg) by mouth once daily.   11/5/2024 Morning    ipratropium-albuteroL (Duo-Neb) 0.5-2.5 mg/3 mL nebulizer solution Take 3 mL by nebulization 2 times a day as needed for wheezing or shortness of breath. 180 mL 0 11/4/2024 Evening    metoprolol succinate XL (Toprol-XL) 25 mg 24 hr tablet Take 0.5 tablets (12.5 mg) by mouth once daily.   11/4/2024 Evening    fluticasone furoate (Arnuity Ellipta) 100 mcg/actuation inhaler Inhale 1 puff once daily. 30 each 3      Current medications:  Scheduled medications  apixaban, 5 mg, oral, BID  atorvastatin, 40 mg, oral, Nightly  eplerenone, 25 mg, oral, Daily  furosemide, 40 mg, oral, Daily  guaiFENesin, 1,200 mg, oral, BID  ipratropium-albuteroL, 3 mL, nebulization, TID  methylPREDNISolone sodium succinate (PF), 40 mg, intravenous, q12h  metoprolol succinate XL, 12.5 mg, oral, Daily  oxygen, , inhalation, Continuous - Inhalation      Continuous medications     PRN medications  PRN medications: acetaminophen **OR** [DISCONTINUED] acetaminophen **OR** [DISCONTINUED] acetaminophen, albuterol, melatonin, [DISCONTINUED] ondansetron **OR** ondansetron, polyethylene glycol    Review of Systems   Constitutional:  Negative for chills and fever.   Respiratory:  Positive for cough and shortness of breath.         Objective  Range of Vitals (last 24 hours)  Heart Rate:  []   Temp:  [36.7 °C (98.1 °F)-37 °C (98.6 °F)]   Resp:  [16-22]   BP: (117-141)/(64-73)   SpO2:  [88 %-100 %]   Daily Weight  11/07/24 : 77.4 kg (170 lb 10.2 oz)    Body mass index is 23.81 kg/m².     Physical Exam  Constitutional:       Appearance: Normal appearance.   HENT:      Head: Normocephalic and atraumatic.      Nose: Nose normal.   Eyes:      Extraocular Movements: Extraocular movements intact.      Conjunctiva/sclera: Conjunctivae normal.   Cardiovascular:      Rate and Rhythm: Normal rate and regular rhythm.      Heart sounds: Normal heart sounds.   Pulmonary:      Breath sounds:  "Decreased breath sounds present.   Abdominal:      General: Bowel sounds are normal.      Palpations: Abdomen is soft.      Tenderness: There is no abdominal tenderness.   Musculoskeletal:      Cervical back: Normal range of motion and neck supple.      Right lower leg: No edema.      Left lower leg: No edema.   Skin:     General: Skin is warm and dry.   Neurological:      Mental Status: He is alert.   Psychiatric:         Behavior: Behavior normal. Behavior is cooperative.          Relevant Results  Outside Hospital Results    Labs  Results from last 72 hours   Lab Units 11/08/24  0542 11/07/24  0914 11/06/24  0526   WBC AUTO x10*3/uL 7.9 15.2* 5.7   HEMOGLOBIN g/dL 11.5* 13.0* 12.7*   HEMATOCRIT % 35.2* 39.8* 38.0*   PLATELETS AUTO x10*3/uL 112* 149* 108*     Results from last 72 hours   Lab Units 11/08/24  0542 11/07/24  0914 11/06/24  0526   SODIUM mmol/L 135* 134* 133*   POTASSIUM mmol/L 4.7 4.5 4.4   CHLORIDE mmol/L 104 100 101   CO2 mmol/L 28 24 26   BUN mg/dL 34* 37* 27*   CREATININE mg/dL 1.23 1.37* 1.26   GLUCOSE mg/dL 161* 182* 158*   CALCIUM mg/dL 8.2* 8.9 8.7   ANION GAP mmol/L 8* 15 10   EGFR mL/min/1.73m*2 56* 49* 54*         Estimated Creatinine Clearance: 42.5 mL/min (by C-G formula based on SCr of 1.23 mg/dL).  CRP   Date Value Ref Range Status   02/18/2019 0.56 mg/dL Final     Comment:     REF VALUE  < 1.00     06/05/2018 0.46 mg/dL Final     Comment:     REF VALUE  < 1.00       No results found for: \"HIV1X2\", \"HIVCONF\", \"ONAKYA8WX\"  No results found for: \"HEPCABINIT\", \"HEPCAB\", \"HCVPCRQUANT\"  Microbiology  Susceptibility data from last 90 days.  Collected Specimen Info Organism   11/05/24 Fluid from SPUTUM MALDI Microbe     Susceptibility data from last 90 days.  Collected Specimen Info Organism   11/05/24 Fluid from SPUTUM Serratia marcescens group   ;  Imaging  ECG 12 lead    Result Date: 11/7/2024  Undetermined rhythm Inferior infarct (cited on or before 22-NOV-2023) Abnormal ECG When compared " with ECG of 18-SEP-2024 16:36, Current undetermined rhythm precludes rhythm comparison, needs review ST no longer depressed in Inferior leads    CT angio chest for pulmonary embolism    Result Date: 11/5/2024  Interpreted By:  Jacob Dyson, STUDY: CT ANGIO CHEST FOR PULMONARY EMBOLISM;  11/5/2024 1:33 pm   INDICATION: Signs/Symptoms:SOB.   COMPARISON: CT chest 09/12/2024.   ACCESSION NUMBER(S): [id number]   ORDERING CLINICIAN: RACHEL VASQUEZ   TECHNIQUE: Helical data acquisition of the chest was obtained following intravenous administration of 75 ml of Omnipaque 350. Images were reformatted in coronal and sagittal planes. Axial and coronal MIP images were created and reviewed.   FINDINGS: POTENTIAL LIMITATIONS OF THE STUDY: None   HEART AND VESSELS: No discrete filling defects within the main pulmonary artery or its branches. Previously shown filling defects in the right upper lobar pulmonary arteries are no longer present.   Main pulmonary artery is normal in caliber.   The thoracic aorta is of normal course and caliber with mild vascular calcifications. Grossly similar noncalcified plaques in the descending thoracic aorta.   No significant coronary artery calcifications.The study is not optimized for evaluation of coronary arteries.   The cardiac chambers are not enlarged. Status post aortic valve replacement.   No evidence of pericardial effusion.   MEDIASTINUM AND LUIS MIGUEL, LOWER NECK AND AXILLA: The visualized thyroid gland is within normal limits.   No evidence of thoracic lymphadenopathy by CT criteria.   Fluid within the esophagus.   LUNGS AND AIRWAYS: There is diffuse bronchial wall thickening throughout all 5 lobes with multifocal mucous plugging. Mild bilateral lobe atelectasis. No pulmonary edema, pleural effusion or pneumothorax. No suspicious nodules identified.   UPPER ABDOMEN: Grossly stable varying-sized hepatic cysts. Mild pneumobilia, likely postprocedural. Bilateral renal cysts. Nonobstructing  punctate right upper pole renal calculus.   CHEST WALL AND OSSEOUS STRUCTURES: Mild bilateral gynecomastia. Multilevel degenerative disc disease with bridging osteophytes and superimposed diffuse idiopathic skeletal hyperostosis.       No acute PE. Resolution of prior right upper lobar PE.   Mild diffuse bronchitis with multifocal mucous plugging. Fluid within the esophagus with increased aspiration risk.   MACRO None   Signed by: Jacob Dyson 11/5/2024 2:21 PM Dictation workstation:   [id number]    Assessment/Plan   Acute hypoxic respiratory failure  Serratia bronchitis visit pneumonia    Discontinue Augmentin  IV meropenem-empiric therapy  Follow-up sputum culture result  Supplemental oxygen  Supportive care  Monitor temperature and WBC  Final recommendations based on culture results    Dayron Gonzalez MD

## 2024-11-09 VITALS
WEIGHT: 174.82 LBS | TEMPERATURE: 97 F | HEART RATE: 89 BPM | OXYGEN SATURATION: 92 % | HEIGHT: 71 IN | DIASTOLIC BLOOD PRESSURE: 72 MMHG | SYSTOLIC BLOOD PRESSURE: 142 MMHG | BODY MASS INDEX: 24.48 KG/M2 | RESPIRATION RATE: 16 BRPM

## 2024-11-09 LAB
ANION GAP SERPL CALC-SCNC: 9 MMOL/L (ref 10–20)
BACTERIA BLD CULT: NORMAL
BACTERIA BLD CULT: NORMAL
BACTERIA SPEC RESP CULT: ABNORMAL
BACTERIA SPEC RESP CULT: ABNORMAL
BNP SERPL-MCNC: 223 PG/ML (ref 0–99)
BUN SERPL-MCNC: 37 MG/DL (ref 6–23)
CALCIUM SERPL-MCNC: 8.4 MG/DL (ref 8.6–10.3)
CHLORIDE SERPL-SCNC: 103 MMOL/L (ref 98–107)
CO2 SERPL-SCNC: 28 MMOL/L (ref 21–32)
CREAT SERPL-MCNC: 1.16 MG/DL (ref 0.5–1.3)
EGFRCR SERPLBLD CKD-EPI 2021: 60 ML/MIN/1.73M*2
ERYTHROCYTE [DISTWIDTH] IN BLOOD BY AUTOMATED COUNT: 13.8 % (ref 11.5–14.5)
GLUCOSE SERPL-MCNC: 156 MG/DL (ref 74–99)
GRAM STN SPEC: ABNORMAL
GRAM STN SPEC: ABNORMAL
HCT VFR BLD AUTO: 39.4 % (ref 41–52)
HGB BLD-MCNC: 12.8 G/DL (ref 13.5–17.5)
MCH RBC QN AUTO: 29.7 PG (ref 26–34)
MCHC RBC AUTO-ENTMCNC: 32.5 G/DL (ref 32–36)
MCV RBC AUTO: 91 FL (ref 80–100)
NRBC BLD-RTO: 0 /100 WBCS (ref 0–0)
PLATELET # BLD AUTO: 119 X10*3/UL (ref 150–450)
POTASSIUM SERPL-SCNC: 4.8 MMOL/L (ref 3.5–5.3)
RBC # BLD AUTO: 4.31 X10*6/UL (ref 4.5–5.9)
SODIUM SERPL-SCNC: 135 MMOL/L (ref 136–145)
WBC # BLD AUTO: 6 X10*3/UL (ref 4.4–11.3)

## 2024-11-09 PROCEDURE — 94761 N-INVAS EAR/PLS OXIMETRY MLT: CPT | Mod: IPSPLIT

## 2024-11-09 PROCEDURE — 2500000002 HC RX 250 W HCPCS SELF ADMINISTERED DRUGS (ALT 637 FOR MEDICARE OP, ALT 636 FOR OP/ED): Mod: IPSPLIT | Performed by: INTERNAL MEDICINE

## 2024-11-09 PROCEDURE — 99239 HOSP IP/OBS DSCHRG MGMT >30: CPT

## 2024-11-09 PROCEDURE — 2500000001 HC RX 250 WO HCPCS SELF ADMINISTERED DRUGS (ALT 637 FOR MEDICARE OP): Mod: IPSPLIT

## 2024-11-09 PROCEDURE — 94760 N-INVAS EAR/PLS OXIMETRY 1: CPT | Mod: IPSPLIT

## 2024-11-09 PROCEDURE — 2500000004 HC RX 250 GENERAL PHARMACY W/ HCPCS (ALT 636 FOR OP/ED): Mod: IPSPLIT

## 2024-11-09 PROCEDURE — 2500000004 HC RX 250 GENERAL PHARMACY W/ HCPCS (ALT 636 FOR OP/ED): Mod: IPSPLIT | Performed by: NURSE PRACTITIONER

## 2024-11-09 PROCEDURE — 80048 BASIC METABOLIC PNL TOTAL CA: CPT | Mod: IPSPLIT

## 2024-11-09 PROCEDURE — 9420000001 HC RT PATIENT EDUCATION 5 MIN: Mod: IPSPLIT

## 2024-11-09 PROCEDURE — 85027 COMPLETE CBC AUTOMATED: CPT | Mod: IPSPLIT | Performed by: NURSE PRACTITIONER

## 2024-11-09 PROCEDURE — 2500000004 HC RX 250 GENERAL PHARMACY W/ HCPCS (ALT 636 FOR OP/ED): Mod: JZ,IPSPLIT | Performed by: INTERNAL MEDICINE

## 2024-11-09 PROCEDURE — 36415 COLL VENOUS BLD VENIPUNCTURE: CPT | Mod: IPSPLIT | Performed by: NURSE PRACTITIONER

## 2024-11-09 PROCEDURE — 94762 N-INVAS EAR/PLS OXIMTRY CONT: CPT | Mod: IPSPLIT

## 2024-11-09 PROCEDURE — 94640 AIRWAY INHALATION TREATMENT: CPT | Mod: IPSPLIT

## 2024-11-09 PROCEDURE — 83880 ASSAY OF NATRIURETIC PEPTIDE: CPT | Mod: IPSPLIT

## 2024-11-09 PROCEDURE — 2500000001 HC RX 250 WO HCPCS SELF ADMINISTERED DRUGS (ALT 637 FOR MEDICARE OP): Mod: IPSPLIT | Performed by: NURSE PRACTITIONER

## 2024-11-09 RX ORDER — LEVOFLOXACIN 500 MG/1
500 TABLET, FILM COATED ORAL DAILY
Qty: 6 TABLET | Refills: 0 | Status: SHIPPED | OUTPATIENT
Start: 2024-11-09 | End: 2024-11-15

## 2024-11-09 RX ORDER — METHYLPREDNISOLONE 4 MG/1
TABLET ORAL
Qty: 21 TABLET | Refills: 0 | Status: SHIPPED | OUTPATIENT
Start: 2024-11-09 | End: 2024-11-16

## 2024-11-09 RX ORDER — GUAIFENESIN 1200 MG/1
1200 TABLET, EXTENDED RELEASE ORAL 2 TIMES DAILY
Qty: 14 TABLET | Refills: 0 | Status: SHIPPED | OUTPATIENT
Start: 2024-11-09 | End: 2024-11-16

## 2024-11-09 ASSESSMENT — PAIN SCALES - GENERAL: PAINLEVEL_OUTOF10: 0 - NO PAIN

## 2024-11-09 NOTE — CARE PLAN
This RT walked with patient on RA. SpO2 prior to walk on RA 94%. During walk, the lowest patient SpO2 was 90%. No dyspnea noted. Patient does not qualify for home-going oxygen.

## 2024-11-09 NOTE — CARE PLAN
The patient's goals for the shift include Patient will remain injury free this shift 11/7/24 1900.    The clinical goals for the shift include Pt will tolerate oral ATB with no adverse reactions. Pt will tolerate overnight oxygen trend.    Discharge home

## 2024-11-09 NOTE — NURSING NOTE
+understanding discharge instructions, pt/ spouse/ Dtr/ STEFANIE; aware scripts available for  @ pharmacy.  Copy of AVS provided.

## 2024-11-09 NOTE — CARE PLAN
RT has completed the requested/ordered overnight trend on room air and patient does not qualify for home-going oxygen with his discharge.    Total Time Below: 1min 41sec  Longest Duration: 47sec  Lowest SpO2: 85% at 11/08/24 @ 11:07:29 PM  Number of Events: 3

## 2024-11-09 NOTE — DISCHARGE SUMMARY
Discharge Diagnosis  Acute on chronic congestive heart failure, unspecified heart failure type    Issues Requiring Follow-Up  Patient feeling well, ambulating in malone without difficulty.  Patient did not qualify for HS oxygen and had not decrease with ambulation.  Patient to complete antibiotic and steroid regimen and follow up with Pulmonology, Cardiology,  and PCP.      Discharge Meds     Medication List      START taking these medications     guaiFENesin 1,200 mg tablet extended release 12hr; Commonly known as:   Mucinex; Take 1 tablet (1,200 mg) by mouth 2 times a day for 7 days. Do   not crush, chew, or split.   levoFLOXacin 500 mg tablet; Commonly known as: Levaquin; Take 1 tablet   (500 mg) by mouth once daily for 6 days.   methylPREDNISolone 4 mg tablets; Commonly known as: Medrol Dospak; Take   as directed on package.     CONTINUE taking these medications     albuterol 90 mcg/actuation inhaler; Commonly known as: Ventolin HFA;   Inhale 2 puffs every 4 hours if needed for wheezing or shortness of   breath.   Arnuity Ellipta 100 mcg/actuation inhaler; Generic drug: fluticasone   furoate; Inhale 1 puff once daily.   atorvastatin 40 mg tablet; Commonly known as: Lipitor   Eliquis 5 mg tablet; Generic drug: apixaban; Take 1 tablet(5mg) by mouth   twice daily as directed; Start taking on: October 9, 2024   eplerenone 25 mg tablet; Commonly known as: Inspra   furosemide 40 mg tablet; Commonly known as: Lasix   ipratropium-albuteroL 0.5-2.5 mg/3 mL nebulizer solution; Commonly known   as: Duo-Neb; Take 3 mL by nebulization 2 times a day as needed for   wheezing or shortness of breath.   metoprolol succinate XL 25 mg 24 hr tablet; Commonly known as: Toprol-XL       Test Results Pending At Discharge  Pending Labs       Order Current Status    Blood Culture Preliminary result    Blood Culture Preliminary result            Hospital Course   Principal Problems  #Acute on chronic diastolic heart failure  #Essential HTN  (hypertension)  #Hypercholesteremia  #ASHD  -LR 1,000 ml IV given in ED  - > 331 > 223  -Magnesium 2.28  -last echo 9/13/24: normal LVSF with an EF of 55-60% with impaired relaxation pattern of LVDF, moderate MR  -continue Metoprolol succinate 12.5 mg daily, atorvastatin 40 mg hs, eplerenone 25 mg daily,  -continue furosemide 40 mg daily  -Bumex 1 g IV given in ED  -Cardiology consult, appreciate recs   -cardiac monitoring via telemetry  -continue apixaban 5 mg BID  -daily weight  -I&O     COPD exacerbation (Multi)  Acute Hypoxic respiratory failure  -Covid/Flu Negative  -WBC 7.8 > 7.9  -Lactate 1.8  -Blood cultures NGTD  -Respiratory Culture (4+) Abundant Serratia marcescens group Abnormal    -Procalcitonin 0.06  -CTA Chest: No acute PE. Resolution of prior right upper lobar PE.   Mild diffuse bronchitis with multifocal mucous plugging. Fluid within  the esophagus with increased aspiration risk.  -completed azithromycin 500 mg Daily (Day 3) completed  -continue DuoNebs q6h  -continue Solu Medrol 40 mg IV Q 12 hours > Medrol Dose Oleg  -Merrem 1 g IV Q 12 hours (Day 1) > Levaquin 500 mg Daily (6 more days)  -continue guaifenesin 1,200 mg BID > continue for 7 days   -supplemental oxygen to keep SpO2 > 90%  -Home O2 RA  -Currently on RA  -Does not require HS oxygen  -Ambulating Pulse Ox with no drop  -RT for bronchial hygiene   -Infectious Disease Consult, appreciate recs     Acute on Chronic kidney disease, stage 3 unspecified (Multi)  -BUN/Creat/GFR  25/1.25; today 37/1.16/60  -LR 1,000 ml IV given in ED     History of pulmonary embolism  -continue apixban 5 mg BID     DVT Prophylaxis:   -continue apixaban 5 mg bid      Code Status: Full Code  Fluids: N/A     Nutrition: Cardiac    Pertinent Physical Exam At Time of Discharge  Physical Exam  Constitutional:       Appearance: Normal appearance.   HENT:      Head: Normocephalic and atraumatic.      Nose: Nose normal.      Mouth/Throat:      Mouth: Mucous  membranes are moist.   Eyes:      Extraocular Movements: Extraocular movements intact.      Pupils: Pupils are equal, round, and reactive to light.   Cardiovascular:      Rate and Rhythm: Normal rate and regular rhythm.      Pulses: Normal pulses.      Heart sounds: Normal heart sounds.   Pulmonary:      Effort: Pulmonary effort is normal.      Breath sounds: Normal breath sounds.   Abdominal:      General: Bowel sounds are normal.      Palpations: Abdomen is soft.   Musculoskeletal:         General: Normal range of motion.      Cervical back: Normal range of motion.   Skin:     General: Skin is warm and dry.      Capillary Refill: Capillary refill takes less than 2 seconds.   Neurological:      Mental Status: He is alert. Mental status is at baseline.   Psychiatric:         Mood and Affect: Mood normal.         Behavior: Behavior normal.         Outpatient Follow-Up  No future appointments.    Disposition: Patient was stable to be discharged to home. He is to complete antibiotics and steroids.  Patient to follow up with Pulmonology, Cardiology,  and PCP.       Total cumulative time spent in preparation of this discharge including documentation review, coordination of care with the medical team including PT/SW/care coordinators and treating consultants, discussion with patient and pertinent family members and finalization of prescriptions, follow-up appointments, and this discharge summary was approximately 45 minutes.       VELMA Jack-CNP

## 2024-11-09 NOTE — CARE PLAN
The patient's goals for the shift include Patient will remain injury free this shift 11/7/24 1900.    The clinical goals for the shift include Pt will tolerate oral ATB with no adverse reactions. Pt will tolerate overnight oxygen trend.    Over the shift, the patient remained stable and free from injuries. VSS, Pt did well over night and did not require oxygen so when pt discharges he will not need home O2. Started pt on IV Meropenum, Augmentin was discontinued by ID. Tolerated well. Pt had no complaints throughout night.

## 2024-11-11 ENCOUNTER — PATIENT OUTREACH (OUTPATIENT)
Dept: CARE COORDINATION | Facility: CLINIC | Age: 89
End: 2024-11-11
Payer: MEDICARE

## 2024-11-11 SDOH — ECONOMIC STABILITY: FOOD INSECURITY
ARE ANY OF YOUR NEEDS URGENT? FOR EXAMPLE, UNCERTAINTY OF WHERE YOU WILL GET YOUR NEXT MEAL OR NOT HAVING THE MEDICATIONS YOU NEED TO TAKE TOMORROW.: NO

## 2024-11-11 SDOH — ECONOMIC STABILITY: GENERAL: WOULD YOU LIKE HELP WITH ANY OF THE FOLLOWING NEEDS?: I DONT NEED HELP WITH ANY OF THESE

## 2024-11-11 NOTE — PROGRESS NOTES
[facility]  Admitted 11/5/2024  Discharged 11/9/2024  Dx: Acute on Chronic Heart Failure, Acute Hypoxic Respiratory Failiure, Bronchitis, COPD Exacerbation    Outreach call to patient to support a smooth transition of care from recent admission.  Spoke with Wiliam (dtr), Radhames is having a good day today. His cough has resolved. No shortness of breath. No swelling noted in the lower extremities. Reviewed discharge medications, discharge instructions, assessed social needs. Wiliam did have a question regarding the arnuity ellipta inhaler: was it prescribed by the hospital or was he taking it prior to this hospitalization. CM advised patient was taking this medication prior to this hospitalization. Wiliam states, she will follow up with his PCP to see if he is to continue using this inhaler.    Engagement  Call Start Time: 1327 (completed with daughter Wiliam) (11/11/2024  1:27 PM)    Medications  Medications reviewed with patient/caregiver?: Yes (11/11/2024  1:27 PM)  Is the patient having any side effects they believe may be caused by any medication additions or changes?: No (11/11/2024  1:27 PM)  Does the patient have all medications ordered at discharge?: Yes (11/11/2024  1:27 PM)  Care Management Interventions: No intervention needed (11/11/2024  1:27 PM)  Prescription Comments: Discharged on Guaifenesin, Levaquin, Medrol Dopak (11/11/2024  1:27 PM)  Is the patient taking all medications as directed (includes completed medication regime)?: Yes (11/11/2024  1:27 PM)  Medication Comments: Wiliam to follow up with PCP to if patient is to continue the Arnuity Ellipta inhaler. (11/11/2024  1:27 PM)    Appointments  Does the patient have a primary care provider?: Yes (11/11/2024  1:27 PM)  Care Management Interventions: Advised patient to make appointment (11/11/2024  1:27 PM)    Self Management  What is the home health agency?: not applicable (11/11/2024  1:27 PM)  What Durable Medical Equipment (DME) was  ordered?: not applicable (11/11/2024  1:27 PM)    Patient Teaching  Does the patient have access to their discharge instructions?: Yes (11/11/2024  1:27 PM)  Care Management Interventions: Reviewed instructions with patient (11/11/2024  1:27 PM)  What is the patient's perception of their health status since discharge?: Improving (11/11/2024  1:27 PM)  Is the patient/caregiver able to teach back the hierarchy of who to call/visit for symptoms/problems? PCP, Specialist, Home Health nurse, Urgent Care, ED, 911: Yes (11/11/2024  1:27 PM)    Will continue to monitor through transition period.    Rachel Lopez RN/CM  [facility]  [phone]

## 2024-11-12 DIAGNOSIS — I26.99 ACUTE PULMONARY EMBOLISM, UNSPECIFIED PULMONARY EMBOLISM TYPE, UNSPECIFIED WHETHER ACUTE COR PULMONALE PRESENT (MULTI): ICD-10-CM

## 2024-11-14 ENCOUNTER — OFFICE VISIT (OUTPATIENT)
Dept: PULMONOLOGY | Facility: CLINIC | Age: 89
End: 2024-11-14
Payer: MEDICARE

## 2024-11-14 VITALS
BODY MASS INDEX: 23.47 KG/M2 | HEART RATE: 79 BPM | OXYGEN SATURATION: 96 % | SYSTOLIC BLOOD PRESSURE: 130 MMHG | DIASTOLIC BLOOD PRESSURE: 72 MMHG | WEIGHT: 168.2 LBS

## 2024-11-14 DIAGNOSIS — R06.02 SHORTNESS OF BREATH: Primary | ICD-10-CM

## 2024-11-14 DIAGNOSIS — Z95.2 S/P TAVR (TRANSCATHETER AORTIC VALVE REPLACEMENT): ICD-10-CM

## 2024-11-14 DIAGNOSIS — I50.42 CHRONIC COMBINED SYSTOLIC AND DIASTOLIC HEART FAILURE: ICD-10-CM

## 2024-11-14 PROCEDURE — 3078F DIAST BP <80 MM HG: CPT | Performed by: PEDIATRICS

## 2024-11-14 PROCEDURE — 1159F MED LIST DOCD IN RCRD: CPT | Performed by: PEDIATRICS

## 2024-11-14 PROCEDURE — 99215 OFFICE O/P EST HI 40 MIN: CPT | Performed by: PEDIATRICS

## 2024-11-14 PROCEDURE — 1111F DSCHRG MED/CURRENT MED MERGE: CPT | Performed by: PEDIATRICS

## 2024-11-14 PROCEDURE — 3075F SYST BP GE 130 - 139MM HG: CPT | Performed by: PEDIATRICS

## 2024-11-14 PROCEDURE — 1160F RVW MEDS BY RX/DR IN RCRD: CPT | Performed by: PEDIATRICS

## 2024-11-14 PROCEDURE — 1036F TOBACCO NON-USER: CPT | Performed by: PEDIATRICS

## 2024-11-14 NOTE — PROGRESS NOTES
Subjective   Patient ID: Radhames Ken is a 89 y.o. male who presents for SOB, CHF    HPI    3/11/2024:  New patient here today to establish care.  Patient will be having a TAVR and needs a pulmonary workup before surgery.  Patient has no history of asthma or COPD.  He only gets short of breath when he is fluid overloaded.  He is on Arnuity but recently was sick.  He has a nebulizer at home that he says helps and he does use it on occasion.  Patient will need a PFT before clearance    04/09/24 he is here today for follow-up.  He had his TAVR and did well.  He did not have his MV replaced.  He is fluid overloaded but his BNP is trending down since surgery.  I encouraged him to keep his legs elevated when at home.  He is using his albuterol.  He has a cough and finds it very difficult to bring up mucus.  He does have some rhonchi in his right lower lobe.    07/10/24 He is here today for follow-up.  He has been doing well.  He has a slight wheeze I will send him in a couple days of prednisone.  He tells me he does great he has very little shortness of breath.  He is not using his Arnuity and has not needed his albuterol.     08/22/24 he is here today with some shortness of breath.  He is coughing and has had some occasional wheezing.  He is also doing some throat clearing.  Sounds like some postnasal drip related to the weather changing.  Lungs are clear.  He does have some bilateral lower extremity edema.    11/14/2024:  Mr Ken was recently admitted for CHF x 2 once in Sept and once in Nov.  HE was also found to have a small PE in September.  This was not seen on the CT in November, the CT in Nov did show some thickened bronchi concerning for possible bronchitis and he was treated with antibiotics, and given aerosols for that.  Today he feels well.  His wife wants to give him inhalers/nebulized medication with any cough or wheeze.  I explained with his heart failure it is hard to know if any wheezing is from  the heart of lungs.      Review of Systems    See scanned documents attached to this note for review of systems, and appropriate scales/scores for this visit.     Objective   Physical Exam  Constitutional:       Appearance: Normal appearance.   HENT:      Head: Normocephalic and atraumatic.      Mouth/Throat:      Pharynx: Oropharynx is clear.   Cardiovascular:      Rate and Rhythm: Normal rate and regular rhythm.      Pulses: Normal pulses.      Heart sounds: Normal heart sounds.   Pulmonary:      Effort: Pulmonary effort is normal.      Breath sounds: Normal breath sounds. No wheezing, rhonchi or rales.   Abdominal:      General: Bowel sounds are normal.      Palpations: Abdomen is soft.   Musculoskeletal:         General: Normal range of motion.   Skin:     General: Skin is warm and dry.   Neurological:      General: No focal deficit present.      Mental Status: He is alert and oriented to person, place, and time.   Psychiatric:         Mood and Affect: Mood normal.       Assessment/Plan       He is active, no complaints   He is using his nebulizer as needed not using arnuity      # SOB  Never smoker  With wheezing and coughing  PNA 12/22/23 with hospitalization   Acute bronchitis treated on 2/16/24  He will need a PFT prior to surgery  You can continue with your Arnuity and your albuterol as needed  04/09/24 he is not feeling well today.  BNP was 1,355 on 3/29 but is trending down from surgery.He has a cough productive of sputum and feels congested. Will give him a zpack and mucinex    08/22/24 he has been more congested.  His wife has noticed an occasional wheeze with some coughing.  Sounds like postnasal drip.  Lungs are clear.  He is fluid overloaded bilaterally feet and ankles.  11/14/2024:  he does not seem to need arnuity.  - since you would not be able to tell if wheezing is from lungs or heart while at home, it is ok to give him albuterol if and only if you hear him wheeze.  - he can take mucinex for  cough if he needs it     # CHF:  2 admissions for CHF  - follow up with cardiology    #PE:  RUL small already resolved on repeat CT  - continue eliquis for 3 months total    # TAVR   - EF 5-40%   - He is being evaluated for surgery   - His MV is leaking also   04/09/24 he had the TAVR surgery. They did not replace the MV   11/14/2024:  follow up with cardiology      Follow up as needed     Jose Joya MD 11/14/24 11:35 AM    never intubated

## 2024-11-15 LAB
ATRIAL RATE: 93 BPM
P AXIS: 53 DEGREES
P OFFSET: 195 MS
P ONSET: 133 MS
PR INTERVAL: 176 MS
Q ONSET: 221 MS
QRS COUNT: 15 BEATS
QRS DURATION: 98 MS
QT INTERVAL: 340 MS
QTC CALCULATION(BAZETT): 427 MS
QTC FREDERICIA: 396 MS
R AXIS: -25 DEGREES
T AXIS: 55 DEGREES
T OFFSET: 391 MS
VENTRICULAR RATE: 95 BPM

## 2024-11-18 ENCOUNTER — APPOINTMENT (OUTPATIENT)
Dept: PRIMARY CARE | Facility: CLINIC | Age: 89
End: 2024-11-18
Payer: MEDICARE

## 2024-11-21 ENCOUNTER — PATIENT OUTREACH (OUTPATIENT)
Dept: CARE COORDINATION | Facility: CLINIC | Age: 89
End: 2024-11-21
Payer: MEDICARE

## 2024-11-21 NOTE — PROGRESS NOTES
Outreach call to Wiliam (dtr/ER contact) following up on appointment with Cardiology and Pulmonology.  Radhames is doing okay. He is not having any issues with his breathing. His CHF is at baseline. Discussed appointment and discussed plan of care.  Wiliam  with no questions at this time.  Will continue to follow.      Rachel Lopez RN/CM  [facility]  [phone]

## 2024-11-22 DIAGNOSIS — Z95.2 PRESENCE OF PROSTHETIC HEART VALVE: ICD-10-CM

## 2024-11-22 DIAGNOSIS — I34.0 NONRHEUMATIC MITRAL (VALVE) INSUFFICIENCY: Primary | ICD-10-CM

## 2024-11-22 DIAGNOSIS — I10 ESSENTIAL (PRIMARY) HYPERTENSION: ICD-10-CM

## 2024-11-22 DIAGNOSIS — I50.32 CHRONIC DIASTOLIC (CONGESTIVE) HEART FAILURE: ICD-10-CM

## 2024-11-25 ENCOUNTER — APPOINTMENT (OUTPATIENT)
Dept: PRIMARY CARE | Facility: CLINIC | Age: 89
End: 2024-11-25
Payer: MEDICARE

## 2024-11-25 ENCOUNTER — LAB (OUTPATIENT)
Dept: LAB | Facility: LAB | Age: 89
End: 2024-11-25
Payer: MEDICARE

## 2024-11-25 VITALS
DIASTOLIC BLOOD PRESSURE: 70 MMHG | HEART RATE: 78 BPM | TEMPERATURE: 98.6 F | WEIGHT: 167 LBS | BODY MASS INDEX: 23.3 KG/M2 | SYSTOLIC BLOOD PRESSURE: 122 MMHG | OXYGEN SATURATION: 98 %

## 2024-11-25 DIAGNOSIS — J44.1 COPD EXACERBATION (MULTI): ICD-10-CM

## 2024-11-25 DIAGNOSIS — I10 ESSENTIAL (PRIMARY) HYPERTENSION: ICD-10-CM

## 2024-11-25 DIAGNOSIS — I34.0 NONRHEUMATIC MITRAL (VALVE) INSUFFICIENCY: ICD-10-CM

## 2024-11-25 DIAGNOSIS — I50.42 CHRONIC COMBINED SYSTOLIC AND DIASTOLIC HEART FAILURE: Primary | ICD-10-CM

## 2024-11-25 DIAGNOSIS — I50.32 CHRONIC DIASTOLIC (CONGESTIVE) HEART FAILURE: ICD-10-CM

## 2024-11-25 DIAGNOSIS — Z95.2 PRESENCE OF PROSTHETIC HEART VALVE: ICD-10-CM

## 2024-11-25 PROCEDURE — 1036F TOBACCO NON-USER: CPT | Performed by: FAMILY MEDICINE

## 2024-11-25 PROCEDURE — 3078F DIAST BP <80 MM HG: CPT | Performed by: FAMILY MEDICINE

## 2024-11-25 PROCEDURE — 1111F DSCHRG MED/CURRENT MED MERGE: CPT | Performed by: FAMILY MEDICINE

## 2024-11-25 PROCEDURE — 3074F SYST BP LT 130 MM HG: CPT | Performed by: FAMILY MEDICINE

## 2024-11-25 PROCEDURE — 99214 OFFICE O/P EST MOD 30 MIN: CPT | Performed by: FAMILY MEDICINE

## 2024-11-25 PROCEDURE — 1159F MED LIST DOCD IN RCRD: CPT | Performed by: FAMILY MEDICINE

## 2024-11-25 RX ORDER — METHYLPREDNISOLONE 4 MG/1
TABLET ORAL
Qty: 21 TABLET | Refills: 0 | Status: SHIPPED | OUTPATIENT
Start: 2024-11-25 | End: 2024-12-02

## 2024-11-25 RX ORDER — FUROSEMIDE 40 MG/1
40 TABLET ORAL DAILY
Qty: 30 TABLET | Refills: 1 | Status: SHIPPED | OUTPATIENT
Start: 2024-11-25

## 2024-11-25 RX ORDER — LEVOFLOXACIN 500 MG/1
500 TABLET, FILM COATED ORAL DAILY
Qty: 10 TABLET | Refills: 0 | Status: SHIPPED | OUTPATIENT
Start: 2024-11-25 | End: 2024-12-05

## 2024-11-25 ASSESSMENT — ENCOUNTER SYMPTOMS
WHEEZING: 0
VOMITING: 0
EYE DISCHARGE: 0
PALPITATIONS: 0
SORE THROAT: 0
RHINORRHEA: 0
NUMBNESS: 0
SINUS PRESSURE: 0
MYALGIAS: 0
SLEEP DISTURBANCE: 0
HEADACHES: 0
JOINT SWELLING: 0
DIARRHEA: 0
DYSPHORIC MOOD: 0
SHORTNESS OF BREATH: 1
ACTIVITY CHANGE: 0
HEMATURIA: 0
BLOOD IN STOOL: 0
ARTHRALGIAS: 0
UNEXPECTED WEIGHT CHANGE: 0
DIZZINESS: 0
FLANK PAIN: 0
CONSTIPATION: 0
APPETITE CHANGE: 0
WEAKNESS: 1
LIGHT-HEADEDNESS: 0
DYSURIA: 0
FEVER: 0
ABDOMINAL PAIN: 0
EYE ITCHING: 0
NERVOUS/ANXIOUS: 0
NAUSEA: 0

## 2024-11-25 NOTE — PROGRESS NOTES
Subjective   Patient ID: Radhames Ken is a 89 y.o. male who presents for Hospital Follow-up ([facility]- multiple things to discuss- especially medications.) and Fall (Lasntight- hurt his L elbow. ).    HPI REVIEWED THE HOSPITAL RECORD /DISCHARGED THE 9TH WITH COPD EXACERBATION AND ACUTE ON CHRONIC CHF   [facility] 11-20/COMMENTED ON BEING IN THE HOSPITAL 3 TIMES OVER LAST 6 MONTHS /HE INDICATED TO THE DAUGHTER THAT MEDICATIONS LIKE LASIX AND ANTIBIOTIC AND STEROIDS SHOULD BE USED TO REPENT THESE ADMISSIONS IN HIS CASE (PALLIATIVE CARE)   PULMONARY DR STATED MOSTLY CHF   HE HAS SEVERE AORTIC VALVE STENOSIS     Review of Systems   Constitutional:  Negative for activity change, appetite change, fever and unexpected weight change.   HENT:  Positive for hearing loss. Negative for congestion, ear pain, postnasal drip, rhinorrhea, sinus pressure and sore throat.    Eyes:  Negative for discharge, itching and visual disturbance.   Respiratory:  Positive for shortness of breath. Negative for wheezing.    Cardiovascular:  Negative for chest pain and palpitations.   Gastrointestinal:  Negative for abdominal pain, blood in stool, constipation, diarrhea, nausea and vomiting.   Endocrine: Negative for cold intolerance, heat intolerance and polyuria.   Genitourinary:  Negative for dysuria, flank pain and hematuria.   Musculoskeletal:  Negative for arthralgias, gait problem, joint swelling and myalgias.   Skin:  Negative for rash.   Allergic/Immunologic: Negative for environmental allergies and food allergies.   Neurological:  Positive for weakness. Negative for dizziness, syncope, light-headedness, numbness and headaches.   Psychiatric/Behavioral:  Negative for dysphoric mood and sleep disturbance. The patient is not nervous/anxious.        Objective   /70   Pulse 78   Temp 37 °C (98.6 °F)   Wt 75.8 kg (167 lb)   SpO2 98%   BMI 23.30 kg/m²     Physical Exam  Vitals and nursing note reviewed.   Constitutional:        Appearance: Normal appearance.   HENT:      Head: Normocephalic.      Ears:      Comments: HE IS DEAF   Cardiovascular:      Rate and Rhythm: Normal rate and regular rhythm.      Pulses: Normal pulses.      Heart sounds: Normal heart sounds. No murmur heard.     No friction rub. No gallop.   Pulmonary:      Effort: Pulmonary effort is normal. No respiratory distress.      Breath sounds: Normal breath sounds. No wheezing.   Abdominal:      General: There is no distension.      Tenderness: There is no abdominal tenderness.   Musculoskeletal:         General: No deformity. Normal range of motion.   Skin:     General: Skin is warm and dry.      Capillary Refill: Capillary refill takes less than 2 seconds.   Neurological:      General: No focal deficit present.      Mental Status: He is alert and oriented to person, place, and time.   Psychiatric:         Mood and Affect: Mood normal.         Assessment/Plan   Problem List Items Addressed This Visit             ICD-10-CM    COPD exacerbation (Multi) J44.1    Relevant Medications    levoFLOXacin (Levaquin) 500 mg tablet    methylPREDNISolone (Medrol Dospak) 4 mg tablets    Chronic combined systolic and diastolic heart failure - Primary I50.42    Relevant Medications    furosemide (Lasix) 40 mg tablet

## 2024-11-25 NOTE — PATIENT INSTRUCTIONS
CONTINUE MEDICATIONS LIKE THEY ARE NOW  WITH ONSET OF ACUTE CHANGE OF COUGH AND CONGESTION AND SOB /LEG SWELLING /GO TO TWO LASIX IN THE MORNING  TAKE THE LEVAQUIN ANTIBIOTIC  TAKE THE STEROID DOSE PACK   ANYTIME BE SEEN HERE OR CALL

## 2024-11-26 ENCOUNTER — APPOINTMENT (OUTPATIENT)
Dept: LAB | Facility: LAB | Age: 89
End: 2024-11-26
Payer: MEDICARE

## 2024-11-26 LAB
ALBUMIN SERPL BCP-MCNC: 3.6 G/DL (ref 3.4–5)
ALP SERPL-CCNC: 136 U/L (ref 33–136)
ALT SERPL W P-5'-P-CCNC: 17 U/L (ref 10–52)
ANION GAP SERPL CALC-SCNC: 10 MMOL/L (ref 10–20)
AST SERPL W P-5'-P-CCNC: 18 U/L (ref 9–39)
BILIRUB SERPL-MCNC: 2 MG/DL (ref 0–1.2)
BNP SERPL-MCNC: 90 PG/ML (ref 0–99)
BUN SERPL-MCNC: 20 MG/DL (ref 6–23)
CALCIUM SERPL-MCNC: 8.8 MG/DL (ref 8.6–10.3)
CHLORIDE SERPL-SCNC: 100 MMOL/L (ref 98–107)
CO2 SERPL-SCNC: 29 MMOL/L (ref 21–32)
CREAT SERPL-MCNC: 1.26 MG/DL (ref 0.5–1.3)
EGFRCR SERPLBLD CKD-EPI 2021: 54 ML/MIN/1.73M*2
ERYTHROCYTE [DISTWIDTH] IN BLOOD BY AUTOMATED COUNT: 14.1 % (ref 11.5–14.5)
GLUCOSE SERPL-MCNC: 92 MG/DL (ref 74–99)
HCT VFR BLD AUTO: 42.7 % (ref 41–52)
HGB BLD-MCNC: 13.8 G/DL (ref 13.5–17.5)
MCH RBC QN AUTO: 29.7 PG (ref 26–34)
MCHC RBC AUTO-ENTMCNC: 32.3 G/DL (ref 32–36)
MCV RBC AUTO: 92 FL (ref 80–100)
NRBC BLD-RTO: 0 /100 WBCS (ref 0–0)
PLATELET # BLD AUTO: 110 X10*3/UL (ref 150–450)
POTASSIUM SERPL-SCNC: 4.4 MMOL/L (ref 3.5–5.3)
PROT SERPL-MCNC: 5.7 G/DL (ref 6.4–8.2)
RBC # BLD AUTO: 4.64 X10*6/UL (ref 4.5–5.9)
SODIUM SERPL-SCNC: 135 MMOL/L (ref 136–145)
WBC # BLD AUTO: 6.3 X10*3/UL (ref 4.4–11.3)

## 2024-12-09 DIAGNOSIS — J44.1 COPD EXACERBATION (MULTI): ICD-10-CM

## 2024-12-09 DIAGNOSIS — I26.99 ACUTE PULMONARY EMBOLISM, UNSPECIFIED PULMONARY EMBOLISM TYPE, UNSPECIFIED WHETHER ACUTE COR PULMONALE PRESENT (MULTI): ICD-10-CM

## 2024-12-09 DIAGNOSIS — J18.9 PNEUMONIA OF BOTH LOWER LOBES DUE TO INFECTIOUS ORGANISM: ICD-10-CM

## 2024-12-09 DIAGNOSIS — R05.9 COUGH, UNSPECIFIED TYPE: ICD-10-CM

## 2024-12-10 RX ORDER — IPRATROPIUM BROMIDE AND ALBUTEROL SULFATE 2.5; .5 MG/3ML; MG/3ML
3 SOLUTION RESPIRATORY (INHALATION) 2 TIMES DAILY PRN
Qty: 180 ML | Refills: 0 | Status: SHIPPED | OUTPATIENT
Start: 2024-12-10

## 2024-12-10 RX ORDER — FLUTICASONE FUROATE 100 UG/1
1 POWDER RESPIRATORY (INHALATION) DAILY
Qty: 30 EACH | Refills: 3 | Status: SHIPPED | OUTPATIENT
Start: 2024-12-10

## 2024-12-11 RX ORDER — APIXABAN 5 MG/1
TABLET, FILM COATED ORAL
Qty: 60 TABLET | Refills: 0 | Status: SHIPPED | OUTPATIENT
Start: 2024-12-11

## 2024-12-13 ENCOUNTER — PATIENT OUTREACH (OUTPATIENT)
Dept: CARE COORDINATION | Facility: CLINIC | Age: 89
End: 2024-12-13
Payer: MEDICARE

## 2024-12-13 NOTE — PROGRESS NOTES
Outreach call to Wiliam (dtr) to check in 30 days after hospital discharge to support smooth transition of care. Wiliam, states, he is doing well. His breathing is good. No weight gain.  Patient with no additional needs noted. No additional outreach needed at this time.   CM will close case    Rachel Lopez RN/CM  [facility]  [phone]     Symptomatic with suspected or confirmed COVID-19. Defer administration of Influenza Vaccine at this time

## 2024-12-17 DIAGNOSIS — I50.42 CHRONIC COMBINED SYSTOLIC AND DIASTOLIC HEART FAILURE: ICD-10-CM

## 2024-12-17 RX ORDER — FUROSEMIDE 40 MG/1
40 TABLET ORAL DAILY
Qty: 90 TABLET | Refills: 1 | Status: SHIPPED | OUTPATIENT
Start: 2024-12-17

## 2025-01-06 DIAGNOSIS — I10 ESSENTIAL (PRIMARY) HYPERTENSION: ICD-10-CM

## 2025-01-06 DIAGNOSIS — I26.99 ACUTE PULMONARY EMBOLISM, UNSPECIFIED PULMONARY EMBOLISM TYPE, UNSPECIFIED WHETHER ACUTE COR PULMONALE PRESENT (MULTI): ICD-10-CM

## 2025-01-06 DIAGNOSIS — J18.9 PNEUMONIA OF BOTH LOWER LOBES DUE TO INFECTIOUS ORGANISM: ICD-10-CM

## 2025-01-06 RX ORDER — IPRATROPIUM BROMIDE AND ALBUTEROL SULFATE 2.5; .5 MG/3ML; MG/3ML
3 SOLUTION RESPIRATORY (INHALATION) 2 TIMES DAILY PRN
Qty: 180 ML | Refills: 0 | Status: SHIPPED | OUTPATIENT
Start: 2025-01-06

## 2025-01-08 RX ORDER — APIXABAN 5 MG/1
TABLET, FILM COATED ORAL
Qty: 60 TABLET | Refills: 0 | Status: SHIPPED | OUTPATIENT
Start: 2025-01-08

## 2025-01-08 RX ORDER — LISINOPRIL 10 MG/1
10 TABLET ORAL 2 TIMES DAILY
Qty: 180 TABLET | Refills: 2 | Status: SHIPPED | OUTPATIENT
Start: 2025-01-08

## 2025-01-09 ENCOUNTER — TELEPHONE (OUTPATIENT)
Dept: PRIMARY CARE | Facility: CLINIC | Age: OVER 89
End: 2025-01-09
Payer: MEDICARE

## 2025-01-15 ENCOUNTER — OFFICE VISIT (OUTPATIENT)
Dept: PRIMARY CARE | Facility: CLINIC | Age: OVER 89
End: 2025-01-15
Payer: MEDICARE

## 2025-01-15 VITALS
BODY MASS INDEX: 25.2 KG/M2 | HEART RATE: 96 BPM | TEMPERATURE: 96.8 F | DIASTOLIC BLOOD PRESSURE: 76 MMHG | WEIGHT: 180.6 LBS | OXYGEN SATURATION: 99 % | SYSTOLIC BLOOD PRESSURE: 130 MMHG

## 2025-01-15 DIAGNOSIS — I50.42 CHRONIC COMBINED SYSTOLIC AND DIASTOLIC HEART FAILURE: ICD-10-CM

## 2025-01-15 DIAGNOSIS — I50.23 ACUTE ON CHRONIC SYSTOLIC CONGESTIVE HEART FAILURE: Primary | ICD-10-CM

## 2025-01-15 PROCEDURE — 99214 OFFICE O/P EST MOD 30 MIN: CPT | Performed by: FAMILY MEDICINE

## 2025-01-15 PROCEDURE — 1123F ACP DISCUSS/DSCN MKR DOCD: CPT | Performed by: FAMILY MEDICINE

## 2025-01-15 PROCEDURE — 1036F TOBACCO NON-USER: CPT | Performed by: FAMILY MEDICINE

## 2025-01-15 PROCEDURE — 3075F SYST BP GE 130 - 139MM HG: CPT | Performed by: FAMILY MEDICINE

## 2025-01-15 PROCEDURE — 3078F DIAST BP <80 MM HG: CPT | Performed by: FAMILY MEDICINE

## 2025-01-15 PROCEDURE — 1159F MED LIST DOCD IN RCRD: CPT | Performed by: FAMILY MEDICINE

## 2025-01-15 PROCEDURE — G2211 COMPLEX E/M VISIT ADD ON: HCPCS | Performed by: FAMILY MEDICINE

## 2025-01-15 RX ORDER — FUROSEMIDE 40 MG/1
TABLET ORAL
Qty: 90 TABLET | Refills: 1 | Status: SHIPPED | OUTPATIENT
Start: 2025-01-15

## 2025-01-15 ASSESSMENT — ENCOUNTER SYMPTOMS
APPETITE CHANGE: 1
SHORTNESS OF BREATH: 1
UNEXPECTED WEIGHT CHANGE: 1
COUGH: 1
ACTIVITY CHANGE: 1

## 2025-01-15 NOTE — PROGRESS NOTES
Subjective   Patient ID: Radhames Ken is a 89 y.o. male who presents for Cough (Accompanied by wife and daughter/Coughing at night all night long then when he wakes up he is fine. They are concerned he is retaining water- last time he weighed 167 in 11/25/24 today he weighs 180.6).    Cough  Associated symptoms include shortness of breath.    EXCESS WATER WEIGHT GAIN  CARDIOLOGIST HAS ALREADY PUT IN PLACE A PLAN WITH THESE THINGS HAPPEN.  INCREASE LASIX TREAT WITH ANTIBIOTIC AND USE THE NEBULIZER   THEY HAVE NOT DONE THAT YET  HE IS HERE FOR EVALUATION     Review of Systems   Constitutional:  Positive for activity change, appetite change and unexpected weight change.        20 POUND AIT GAIN SINCE LAST HERE    Respiratory:  Positive for cough and shortness of breath.         LEGS SWOLLEN TO KNEES PITTING EDEMA    Cardiovascular:  Positive for leg swelling.       Objective   /76   Pulse 96   Temp 36 °C (96.8 °F) (Temporal)   Wt 81.9 kg (180 lb 9.6 oz)   SpO2 99%   BMI 25.20 kg/m²     Physical Exam  Vitals and nursing note reviewed.   Constitutional:       Appearance: Normal appearance.   HENT:      Head: Normocephalic.   Cardiovascular:      Rate and Rhythm: Normal rate and regular rhythm.      Pulses: Normal pulses.      Heart sounds: Normal heart sounds. No murmur heard.     No friction rub. No gallop.   Pulmonary:      Effort: Pulmonary effort is normal. No respiratory distress.      Breath sounds: Normal breath sounds. No wheezing.      Comments: NO RALES OR RONCHI AT THIS TIME   +3 PTE TO THE KNEES     Abdominal:      General: Bowel sounds are normal. There is no distension.      Palpations: Abdomen is soft.      Tenderness: There is no abdominal tenderness.   Musculoskeletal:         General: No deformity. Normal range of motion.   Skin:     General: Skin is warm and dry.      Capillary Refill: Capillary refill takes less than 2 seconds.   Neurological:      General: No focal deficit present.       Mental Status: He is alert and oriented to person, place, and time.   Psychiatric:         Mood and Affect: Mood normal.         Assessment/Plan   Problem List Items Addressed This Visit             ICD-10-CM    Chronic combined systolic and diastolic heart failure I50.42    Relevant Medications    furosemide (Lasix) 40 mg tablet    Acute on chronic systolic congestive heart failure - Primary I50.23

## 2025-01-15 NOTE — PATIENT INSTRUCTIONS
GO HOME AND TAKE ANOTHER LASIX NOW  IN AM DAILY TAKE TWO LASIX EVERY MORNING   WEIGH DAILY MORNING (SAME BED CLOTHES )  ELEVATE THE LEGS AT NIGHT AND WHEN SITTING   TAKE THE ANTIBIOTIC NOW  USE THE NEBULIZER IF HE HAS MORE CONGESTION

## 2025-01-21 ENCOUNTER — TELEPHONE (OUTPATIENT)
Dept: PRIMARY CARE | Facility: CLINIC | Age: OVER 89
End: 2025-01-21

## 2025-01-21 NOTE — TELEPHONE ENCOUNTER
Wiliam is calling because Radhames had a bad night of coughing- per Elisabeth.  She is questioning whether or not his antibiotics should be refilled.    Also they had stopped his Mucinex and is wondering if they should restart that.  If so what should the instructions be?

## 2025-02-03 DIAGNOSIS — J18.9 PNEUMONIA OF BOTH LOWER LOBES DUE TO INFECTIOUS ORGANISM: ICD-10-CM

## 2025-02-03 RX ORDER — IPRATROPIUM BROMIDE AND ALBUTEROL SULFATE 2.5; .5 MG/3ML; MG/3ML
3 SOLUTION RESPIRATORY (INHALATION) 2 TIMES DAILY PRN
Qty: 180 ML | Refills: 0 | Status: SHIPPED | OUTPATIENT
Start: 2025-02-03

## 2025-02-04 ENCOUNTER — APPOINTMENT (OUTPATIENT)
Dept: CARDIOLOGY | Facility: HOSPITAL | Age: OVER 89
End: 2025-02-04
Payer: MEDICARE

## 2025-02-04 ENCOUNTER — APPOINTMENT (OUTPATIENT)
Dept: RADIOLOGY | Facility: HOSPITAL | Age: OVER 89
End: 2025-02-04
Payer: MEDICARE

## 2025-02-04 ENCOUNTER — HOSPITAL ENCOUNTER (EMERGENCY)
Facility: HOSPITAL | Age: OVER 89
Discharge: HOME | End: 2025-02-04
Attending: EMERGENCY MEDICINE
Payer: MEDICARE

## 2025-02-04 VITALS
HEIGHT: 70 IN | WEIGHT: 175 LBS | BODY MASS INDEX: 25.05 KG/M2 | OXYGEN SATURATION: 97 % | HEART RATE: 79 BPM | SYSTOLIC BLOOD PRESSURE: 138 MMHG | TEMPERATURE: 97.7 F | DIASTOLIC BLOOD PRESSURE: 51 MMHG | RESPIRATION RATE: 16 BRPM

## 2025-02-04 DIAGNOSIS — R06.6 SINGULTUS: Primary | ICD-10-CM

## 2025-02-04 LAB
ATRIAL RATE: 74 BPM
FLUAV RNA RESP QL NAA+PROBE: NOT DETECTED
FLUBV RNA RESP QL NAA+PROBE: NOT DETECTED
P AXIS: 64 DEGREES
P OFFSET: 166 MS
P ONSET: 105 MS
PR INTERVAL: 214 MS
Q ONSET: 212 MS
QRS COUNT: 12 BEATS
QRS DURATION: 94 MS
QT INTERVAL: 362 MS
QTC CALCULATION(BAZETT): 401 MS
QTC FREDERICIA: 388 MS
R AXIS: -38 DEGREES
SARS-COV-2 RNA RESP QL NAA+PROBE: NOT DETECTED
T AXIS: 27 DEGREES
T OFFSET: 393 MS
VENTRICULAR RATE: 74 BPM

## 2025-02-04 PROCEDURE — 71045 X-RAY EXAM CHEST 1 VIEW: CPT | Mod: FOREIGN READ | Performed by: RADIOLOGY

## 2025-02-04 PROCEDURE — 96372 THER/PROPH/DIAG INJ SC/IM: CPT

## 2025-02-04 PROCEDURE — 99285 EMERGENCY DEPT VISIT HI MDM: CPT | Performed by: EMERGENCY MEDICINE

## 2025-02-04 PROCEDURE — 71045 X-RAY EXAM CHEST 1 VIEW: CPT

## 2025-02-04 PROCEDURE — 87636 SARSCOV2 & INF A&B AMP PRB: CPT | Performed by: EMERGENCY MEDICINE

## 2025-02-04 PROCEDURE — 2500000004 HC RX 250 GENERAL PHARMACY W/ HCPCS (ALT 636 FOR OP/ED)

## 2025-02-04 PROCEDURE — 93005 ELECTROCARDIOGRAM TRACING: CPT

## 2025-02-04 RX ORDER — CHLORPROMAZINE HCI 25 MG/ML
25 INJECTION INTRAMUSCULAR ONCE
Status: COMPLETED | OUTPATIENT
Start: 2025-02-04 | End: 2025-02-04

## 2025-02-04 RX ORDER — CHLORPROMAZINE HCI 25 MG/ML
INJECTION INTRAMUSCULAR
Status: COMPLETED
Start: 2025-02-04 | End: 2025-02-04

## 2025-02-04 RX ADMIN — CHLORPROMAZINE HCI 25 MG: 25 INJECTION INTRAMUSCULAR at 09:02

## 2025-02-04 RX ADMIN — CHLORPROMAZINE HYDROCHLORIDE 25 MG: 25 INJECTION INTRAMUSCULAR at 09:02

## 2025-02-04 ASSESSMENT — PAIN - FUNCTIONAL ASSESSMENT: PAIN_FUNCTIONAL_ASSESSMENT: 0-10

## 2025-02-04 ASSESSMENT — PAIN SCALES - GENERAL
PAINLEVEL_OUTOF10: 0 - NO PAIN
PAINLEVEL_OUTOF10: 0 - NO PAIN

## 2025-02-04 NOTE — ED TRIAGE NOTES
BIBA from with a c/o hiccups x12 hours and 1 episode of emesis at unknown time after drinking a lot of water today. No hiccups upon arrival. VSS. No s/s of distress.

## 2025-02-04 NOTE — PROGRESS NOTES
Emergency Medicine Transition of Care Note.    I received Radhames Ken in signout from Dr. REED.  Please see the previous ED provider note for all HPI, PE and MDM up to the time of signout at 0700. This is in addition to the primary record.    In brief Radhames Ken is an 89 y.o. male presenting for   Chief Complaint   Patient presents with    Hiccups     BIBA from with a c/o hiccups x12 hours and 1 episode of emesis at unknown time after drinking a lot of water today. No hiccups upon arrival. VSS. No s/s of distress.     At the time of signout we were awaiting: Xray    ED Course as of 02/04/25 0846   Tue Feb 04, 2025   0623 EKG obtained at 610, interpreted by myself.  Normal sinus rhythm with a ventricular rate of 74, left axis deviation, first-degree AV block present with prolonged MT interval of 214, no acute ischemic changes [VT]      ED Course User Index  [VT] Sabiha CHAVEZ MD         Diagnoses as of 02/04/25 0846   [facility]       Medical Decision Making      Final diagnoses:   [R06.6] Yareli       89-year-old very well-appearing presents to the ER with chief complaint of hiccups.  They resolved prior to arrival.  Patient was brought in by EMS.  Patient is well-appearing no complaints at all at this time.  Patient is eating Ken sandwich with no complaints will discharge the patient home    Procedure  Procedures    Edmundo Quach DO

## 2025-02-04 NOTE — ED PROVIDER NOTES
HPI   Chief Complaint   Patient presents with    Hiccups     BIBA from with a c/o hiccups x12 hours and 1 episode of emesis at unknown time after drinking a lot of water today. No hiccups upon arrival. VSS. No s/s of distress.       HPI  Patient is a 89-year-old male brought into the ED today via EMS for hiccups.  Per wife at the bedside, patient has had intractable hiccups for the past 12 hours.  At some point, he did have 1 episode of vomiting after drinking a lot of water.  Per wife, their daughter became worried about his intractable hiccups, so she called 911 to bring the patient here to the ED for further evaluation.  When EMS brought patient outside into the cold air, his hiccups resolved.  At this time, patient does not currently have any hiccups.  He is currently asymptomatic.  He denies any recent fever, cough, chest pain, shortness of breath, abdominal pain, or changes in bowel or bladder habits.  Patient has a history of aortic valve replacement and is anticoagulated on Eliquis.      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    Diarrhea, functional    Heart disease     HL (hearing loss)     Hypertension     Other disorders of plasma-protein metabolism, not elsewhere classified 05/30/2017    Serum albumin decreased    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    Personal history of diseases of the skin and subcutaneous tissue 02/18/2019    History of eczema    Personal history of other diseases of male genital organs     History of BPH    Personal history of other diseases  of the circulatory system     History of hypertension    Personal history of other diseases of the circulatory system     History of mitral valve insufficiency    Personal history of other diseases of the musculoskeletal system and connective tissue     Personal history of arthritis    Personal history of other endocrine, nutritional and metabolic disease     History of hyperlipidemia    Personal history of other specified conditions 11/09/2018    History of seizure    Personal history of other specified conditions 05/05/2022    History of vomiting    Stroke (Multi)     Visual impairment      Past Surgical History:   Procedure Laterality Date    CARDIAC SURGERY  04/28/2014    Heart Surgery    CHOLECYSTECTOMY  04/28/2014    Cholecystectomy    GALLBLADDER SURGERY  04/28/2014    Gallbladder Surgery    MR HEAD ANGIO WO IV CONTRAST  9/30/2018    MR HEAD ANGIO WO IV CONTRAST 9/30/2018 [facility]    MR NECK ANGIO WO IV CONTRAST  9/30/2018    MR NECK ANGIO WO IV CONTRAST 9/30/2018 [facility]    OTHER SURGICAL HISTORY  04/28/2014    Knee Repair    OTHER SURGICAL HISTORY  04/28/2014    Needle Biopsy Of Prostate    OTHER SURGICAL HISTORY  03/05/2021    Complete colonoscopy    OTHER SURGICAL HISTORY  03/05/2021    Endoscopy    OTHER SURGICAL HISTORY  10/08/2018    Common Bile Duct Stone Removal    PROSTATE SURGERY  10/08/2018    Prostate Surgery     Family History   Problem Relation Name Age of Onset    Heart disease Mother Abiola     Breast cancer Mother Abiola     Lung cancer Father      Cancer Brother Arpit     Cancer Brother Kevin     Cancer Brother [place]     Cancer Brother       Social History     Tobacco Use    Smoking status: Never    Smokeless tobacco: Never   Vaping Use    Vaping status: Never Used   Substance Use Topics    Alcohol use: Not Currently    Drug use: Never       Physical Exam   ED Triage Vitals [02/04/25 0537]   Temperature Heart Rate Respirations BP   37 °C (98.6 °F) 77 15 129/62       Pulse Ox Temp Source Heart Rate Source Patient Position   96 % Temporal Monitor Lying      BP Location FiO2 (%)     Left arm --       Physical Exam  Vitals and nursing note reviewed.   Constitutional:       General: He is not in acute distress.     Appearance: He is not toxic-appearing.   HENT:      Head: Normocephalic.      Mouth/Throat:      Mouth: Mucous membranes are moist.   Eyes:      Extraocular Movements: Extraocular movements intact.      Conjunctiva/sclera: Conjunctivae normal.   Cardiovascular:      Rate and Rhythm: Normal rate and regular rhythm.      Pulses: Normal pulses.   Pulmonary:      Effort: Pulmonary effort is normal. No respiratory distress.      Breath sounds: Normal breath sounds. No wheezing.   Abdominal:      General: There is no distension.      Palpations: Abdomen is soft.      Tenderness: There is no abdominal tenderness.   Musculoskeletal:         General: No swelling.      Cervical back: Neck supple.   Skin:     General: Skin is warm and dry.      Capillary Refill: Capillary refill takes less than 2 seconds.   Neurological:      General: No focal deficit present.      Mental Status: He is alert. Mental status is at baseline.           ED Course & MDM   ED Course as of 02/04/25 0633   Tue Feb 04, 2025   0623 EKG obtained at 610, interpreted by myself.  Normal sinus rhythm with a ventricular rate of 74, left axis deviation, first-degree AV block present with prolonged [place] interval of 214, no acute ischemic changes [VT]      ED Course User Index  [VT] Sabiha CHAVEZ MD         Diagnoses as of 02/04/25 0633   Singultus             No data recorded     García Coma Scale Score: 15 (02/04/25 0538 : Tanner Bonilla, SHAY)                         Medical Decision Making  Patient was seen and evaluated for 12 hours of intractable hiccups, now resolved.  On arrival, vital signs are unremarkable.  Additional labs and imaging are ordered for further evaluation.  As patient is anticoagulated on Eliquis,  PE is much less likely.    At this time, lab work and imaging remain pending.  Patient will be signed out to the oncoming physician, Dr. Quach, pending the results of his work-up, reevaluation, and further disposition.    Procedure  Procedures     Sabiha CHAVEZ MD  02/04/25 0649

## 2025-02-08 DIAGNOSIS — I26.99 ACUTE PULMONARY EMBOLISM, UNSPECIFIED PULMONARY EMBOLISM TYPE, UNSPECIFIED WHETHER ACUTE COR PULMONALE PRESENT (MULTI): ICD-10-CM

## 2025-02-10 RX ORDER — APIXABAN 5 MG/1
TABLET, FILM COATED ORAL
Qty: 60 TABLET | Refills: 0 | Status: SHIPPED | OUTPATIENT
Start: 2025-02-10

## 2025-02-13 NOTE — TELEPHONE ENCOUNTER
Wiliam is calling today because Radhames has had 4-5 bouts of diarrhea since yesterday evening.  She would like some advise on what to do ans/or is there is something that should be prescribed/OTC she can get for him.  Please advise.    [phone]

## 2025-02-24 NOTE — ED NOTES
Pt ambulated to bathroom across from room 9     Eda Townsend RN  12/22/23 2021     RX: Gemtesa 30 days    Pharm: [facility]

## 2025-02-26 LAB
ATRIAL RATE: 74 BPM
P AXIS: 64 DEGREES
P OFFSET: 166 MS
P ONSET: 105 MS
PR INTERVAL: 214 MS
Q ONSET: 212 MS
QRS COUNT: 12 BEATS
QRS DURATION: 94 MS
QT INTERVAL: 362 MS
QTC CALCULATION(BAZETT): 401 MS
QTC FREDERICIA: 388 MS
R AXIS: -38 DEGREES
T AXIS: 27 DEGREES
T OFFSET: 393 MS
VENTRICULAR RATE: 74 BPM

## 2025-03-05 DIAGNOSIS — D50.8 IRON DEFICIENCY ANEMIA SECONDARY TO INADEQUATE DIETARY IRON INTAKE: Primary | ICD-10-CM

## 2025-03-05 DIAGNOSIS — I10 PRIMARY HYPERTENSION: ICD-10-CM

## 2025-03-05 DIAGNOSIS — N18.31 STAGE 3A CHRONIC KIDNEY DISEASE (MULTI): ICD-10-CM

## 2025-03-05 NOTE — PROGRESS NOTES
MELISSA HERE WITH WIFE/NON APPOINTMENT  HE APPEARS STABLE  HE WAS IN THE HOSPITAL EARLY FEBRUARY WITH CHF   FM REQUESTS LABS  WIFE GOING TOMORROW FOR LABS  ORDERED CMP AND CBC FOR MELISSA

## 2025-03-07 DIAGNOSIS — J18.9 PNEUMONIA OF BOTH LOWER LOBES DUE TO INFECTIOUS ORGANISM: ICD-10-CM

## 2025-03-07 LAB
ALBUMIN SERPL-MCNC: 3.9 G/DL (ref 3.6–5.1)
ALP SERPL-CCNC: 135 U/L (ref 35–144)
ALT SERPL-CCNC: 13 U/L (ref 9–46)
ANION GAP SERPL CALCULATED.4IONS-SCNC: 8 MMOL/L (CALC) (ref 7–17)
AST SERPL-CCNC: 14 U/L (ref 10–35)
BASOPHILS # BLD AUTO: 72 CELLS/UL (ref 0–200)
BASOPHILS NFR BLD AUTO: 1.2 %
BILIRUB SERPL-MCNC: 1.5 MG/DL (ref 0.2–1.2)
BUN SERPL-MCNC: 21 MG/DL (ref 7–25)
CALCIUM SERPL-MCNC: 9.1 MG/DL (ref 8.6–10.3)
CHLORIDE SERPL-SCNC: 102 MMOL/L (ref 98–110)
CO2 SERPL-SCNC: 28 MMOL/L (ref 20–32)
CREAT SERPL-MCNC: 1.31 MG/DL (ref 0.7–1.22)
EGFRCR SERPLBLD CKD-EPI 2021: 51 ML/MIN/1.73M2
EOSINOPHIL # BLD AUTO: 630 CELLS/UL (ref 15–500)
EOSINOPHIL NFR BLD AUTO: 10.5 %
ERYTHROCYTE [DISTWIDTH] IN BLOOD BY AUTOMATED COUNT: 12.9 % (ref 11–15)
GLUCOSE SERPL-MCNC: 87 MG/DL (ref 65–99)
HCT VFR BLD AUTO: 42.5 % (ref 38.5–50)
HGB BLD-MCNC: 14.1 G/DL (ref 13.2–17.1)
LYMPHOCYTES # BLD AUTO: 1128 CELLS/UL (ref 850–3900)
LYMPHOCYTES NFR BLD AUTO: 18.8 %
MCH RBC QN AUTO: 29.3 PG (ref 27–33)
MCHC RBC AUTO-ENTMCNC: 33.2 G/DL (ref 32–36)
MCV RBC AUTO: 88.4 FL (ref 80–100)
MONOCYTES # BLD AUTO: 420 CELLS/UL (ref 200–950)
MONOCYTES NFR BLD AUTO: 7 %
NEUTROPHILS # BLD AUTO: 3750 CELLS/UL (ref 1500–7800)
NEUTROPHILS NFR BLD AUTO: 62.5 %
PLATELET # BLD AUTO: 137 THOUSAND/UL (ref 140–400)
PMV BLD REES-ECKER: 9.7 FL (ref 7.5–12.5)
POTASSIUM SERPL-SCNC: 4.5 MMOL/L (ref 3.5–5.3)
PROT SERPL-MCNC: 5.4 G/DL (ref 6.1–8.1)
RBC # BLD AUTO: 4.81 MILLION/UL (ref 4.2–5.8)
SODIUM SERPL-SCNC: 138 MMOL/L (ref 135–146)
WBC # BLD AUTO: 6 THOUSAND/UL (ref 3.8–10.8)

## 2025-03-10 RX ORDER — IPRATROPIUM BROMIDE AND ALBUTEROL SULFATE 2.5; .5 MG/3ML; MG/3ML
3 SOLUTION RESPIRATORY (INHALATION) 2 TIMES DAILY PRN
Qty: 180 ML | Refills: 0 | Status: SHIPPED | OUTPATIENT
Start: 2025-03-10

## 2025-03-14 DIAGNOSIS — I26.99 ACUTE PULMONARY EMBOLISM, UNSPECIFIED PULMONARY EMBOLISM TYPE, UNSPECIFIED WHETHER ACUTE COR PULMONALE PRESENT (MULTI): ICD-10-CM

## 2025-03-14 RX ORDER — APIXABAN 5 MG/1
TABLET, FILM COATED ORAL
Qty: 60 TABLET | Refills: 0 | Status: SHIPPED | OUTPATIENT
Start: 2025-03-14

## 2025-04-01 ENCOUNTER — APPOINTMENT (OUTPATIENT)
Dept: PRIMARY CARE | Facility: CLINIC | Age: OVER 89
End: 2025-04-01
Payer: MEDICARE

## 2025-04-09 ENCOUNTER — APPOINTMENT (OUTPATIENT)
Dept: PRIMARY CARE | Facility: CLINIC | Age: OVER 89
End: 2025-04-09
Payer: MEDICARE

## 2025-04-09 VITALS
WEIGHT: 174.8 LBS | HEART RATE: 68 BPM | BODY MASS INDEX: 25.08 KG/M2 | TEMPERATURE: 97.4 F | SYSTOLIC BLOOD PRESSURE: 116 MMHG | OXYGEN SATURATION: 91 % | DIASTOLIC BLOOD PRESSURE: 68 MMHG

## 2025-04-09 DIAGNOSIS — I50.42 CHRONIC COMBINED SYSTOLIC AND DIASTOLIC HEART FAILURE: ICD-10-CM

## 2025-04-09 DIAGNOSIS — R05.9 COUGH, UNSPECIFIED TYPE: ICD-10-CM

## 2025-04-09 DIAGNOSIS — Z86.711 HISTORY OF PULMONARY EMBOLISM: Primary | ICD-10-CM

## 2025-04-09 PROCEDURE — 1123F ACP DISCUSS/DSCN MKR DOCD: CPT | Performed by: FAMILY MEDICINE

## 2025-04-09 PROCEDURE — 3078F DIAST BP <80 MM HG: CPT | Performed by: FAMILY MEDICINE

## 2025-04-09 PROCEDURE — 1159F MED LIST DOCD IN RCRD: CPT | Performed by: FAMILY MEDICINE

## 2025-04-09 PROCEDURE — 3074F SYST BP LT 130 MM HG: CPT | Performed by: FAMILY MEDICINE

## 2025-04-09 PROCEDURE — 99213 OFFICE O/P EST LOW 20 MIN: CPT | Performed by: FAMILY MEDICINE

## 2025-04-09 ASSESSMENT — ENCOUNTER SYMPTOMS
SINUS COMPLAINT: 1
BRUISES/BLEEDS EASILY: 1

## 2025-04-09 NOTE — PROGRESS NOTES
"Subjective   Patient ID: Radhames Ken is a 89 y.o. male who presents for Follow-up (WOULD LIKE TO STOP ELIQUIS, STATING CLOTS ARE GONE), Sinus Problem (ONLY COUGHS AT NIGHT AND ALL DURING IN THE NIGHT/SLEEP PROPPED UP), and Knee Pain (BOTH, USING FURNITURE TO WALK OR HELP WITH WALKING).    Sinus Problem  Pertinent negatives include no congestion, coughing, ear pain, headaches, shortness of breath, sinus pressure or sore throat.   Knee Pain   Pertinent negatives include no numbness.    WANTS TO STOP ELIQUIS AND I SAID THAT IS UP TO CARDIOLOGIST WHO SAID LIFETIME !  PATIENT REPEATEDLY SAYS\"IF I CUT MYSELF SHAVING, I COULD BLEED TO DEATH\"  I REASSURED HIM THIS  WAS NOT THE CASE.  PRESSURE SHOULD STOP THE FLOW OF BLOOD  USE OF A STIPIC PENCIL PERHAPS     Review of Systems   Constitutional:  Negative for activity change, appetite change, fever and unexpected weight change.   HENT:  Positive for hearing loss. Negative for congestion, ear pain, postnasal drip, rhinorrhea, sinus pressure and sore throat.    Eyes:  Negative for discharge, itching and visual disturbance.   Respiratory:  Negative for cough, shortness of breath and wheezing.    Cardiovascular:  Positive for leg swelling. Negative for chest pain and palpitations.        MINOR   Gastrointestinal:  Negative for abdominal pain, blood in stool, constipation, diarrhea, nausea and vomiting.   Endocrine: Negative for cold intolerance, heat intolerance and polyuria.   Genitourinary:  Negative for dysuria, flank pain and hematuria.   Musculoskeletal:  Negative for arthralgias, gait problem, joint swelling and myalgias.   Skin:  Positive for rash.        SKIN LESIONS SCALP      Allergic/Immunologic: Negative for environmental allergies and food allergies.   Neurological:  Negative for dizziness, syncope, weakness, light-headedness, numbness and headaches.   Hematological:  Bruises/bleeds easily.   Psychiatric/Behavioral:  Positive for confusion. Negative for dysphoric " mood and sleep disturbance. The patient is not nervous/anxious.        Objective   /68   Pulse 68   Temp 36.3 °C (97.4 °F)   Wt 79.3 kg (174 lb 12.8 oz)   SpO2 91%   BMI 25.08 kg/m²     Physical Exam  Vitals and nursing note reviewed.   Constitutional:       Appearance: Normal appearance. He is ill-appearing.   HENT:      Head: Normocephalic.      Right Ear: Tympanic membrane normal.      Left Ear: Tympanic membrane normal.      Mouth/Throat:      Mouth: Mucous membranes are moist.   Cardiovascular:      Rate and Rhythm: Normal rate and regular rhythm.      Pulses: Normal pulses.      Heart sounds: Normal heart sounds. No murmur heard.     No friction rub. No gallop.   Pulmonary:      Effort: Pulmonary effort is normal. No respiratory distress.      Breath sounds: Normal breath sounds. No wheezing.   Abdominal:      General: Bowel sounds are normal. There is no distension.      Palpations: Abdomen is soft.      Tenderness: There is no abdominal tenderness.   Musculoskeletal:         General: No deformity. Normal range of motion.   Skin:     General: Skin is warm and dry.      Capillary Refill: Capillary refill takes less than 2 seconds.      Findings: Lesion present.      Comments: FACE AND SCALP RECENTLY TAKEN CARE OF BY DERMATOLOGY    Neurological:      General: No focal deficit present.      Mental Status: He is alert and oriented to person, place, and time.   Psychiatric:         Mood and Affect: Mood normal.         Assessment/Plan   Problem List Items Addressed This Visit             ICD-10-CM    Chronic combined systolic and diastolic heart failure I50.42    Cough R05.9    History of pulmonary embolism - Primary Z86.711

## 2025-04-11 ASSESSMENT — ENCOUNTER SYMPTOMS
APPETITE CHANGE: 0
ABDOMINAL PAIN: 0
LIGHT-HEADEDNESS: 0
DIARRHEA: 0
NUMBNESS: 0
MYALGIAS: 0
PALPITATIONS: 0
DIZZINESS: 0
NAUSEA: 0
WHEEZING: 0
SORE THROAT: 0
NERVOUS/ANXIOUS: 0
FLANK PAIN: 0
HEADACHES: 0
FEVER: 0
EYE DISCHARGE: 0
DYSURIA: 0
SLEEP DISTURBANCE: 0
RHINORRHEA: 0
COUGH: 0
EYE ITCHING: 0
SHORTNESS OF BREATH: 0
ACTIVITY CHANGE: 0
CONFUSION: 1
VOMITING: 0
JOINT SWELLING: 0
DYSPHORIC MOOD: 0
ARTHRALGIAS: 0
BLOOD IN STOOL: 0
HEMATURIA: 0
UNEXPECTED WEIGHT CHANGE: 0
WEAKNESS: 0
CONSTIPATION: 0
SINUS PRESSURE: 0

## 2025-05-07 ENCOUNTER — PROCEDURE VISIT (OUTPATIENT)
Dept: DERMATOLOGY | Facility: CLINIC | Age: OVER 89
End: 2025-05-07
Payer: MEDICARE

## 2025-05-07 VITALS — SYSTOLIC BLOOD PRESSURE: 93 MMHG | DIASTOLIC BLOOD PRESSURE: 53 MMHG | HEART RATE: 79 BPM

## 2025-05-07 DIAGNOSIS — C44.311 BASAL CELL CARCINOMA (BCC) OF SKIN OF NOSE: ICD-10-CM

## 2025-05-07 PROCEDURE — 13121 CMPLX RPR S/A/L 2.6-7.5 CM: CPT | Performed by: DERMATOLOGY

## 2025-05-07 PROCEDURE — 99204 OFFICE O/P NEW MOD 45 MIN: CPT | Performed by: DERMATOLOGY

## 2025-05-07 PROCEDURE — 17311 MOHS 1 STAGE H/N/HF/G: CPT | Performed by: DERMATOLOGY

## 2025-05-07 PROCEDURE — 17312 MOHS ADDL STAGE: CPT | Performed by: DERMATOLOGY

## 2025-05-07 NOTE — LETTER
May 9, 2025     Porsche Dickerson PA-C    Patient: Radhames Ken   YOB: 1933   Date of Visit: 5/7/2025       Dear Dr. Porsche Dickerson PA-C:    Thank you for referring Radhames Ken to me for evaluation. Below are my notes for this consultation.  If you have questions, please do not hesitate to call me. I look forward to following your patient along with you.       Sincerely,     Heather Enamorado MD      CC: No Recipients  ______________________________________________________________________________________    Mohs Surgery Operative Note    Date of Surgery:  5/7/2025  Surgeon:  Heather Enamorado MD  Office Location: [address] [address]  Dept: [phone]  Dept Fax: [phone]  Referring Provider: Porsche Dickerson PA-C  [address] [facility] [place] [address]      Assessment/Plan  Pre-procedure:   Obtained informed consent: written from patient  The surgical site was identified and confirmed with the patient.     Intra-operative:   Audible time out called at : 10:42 AM 05/07/25  by: Tyesha Simpson RN   Verified patient name, birthdate, site, specimen bottle label & requisition.    The planned procedure(s) was again reviewed with the patient. The risks of bleeding, infection, nerve damage and scarring were reviewed. Written authorization was obtained. The patient identity, surgical site, and planned procedure(s) were verified. The provider acted as both surgeon and pathologist.     BASAL CELL CARCINOMA (BCC) OF SKIN OF NOSE  Nasal Tip  Mohs surgery    Consent obtained: written    Universal Protocol:  Procedure explained and questions answered to patient or proxy's satisfaction: Yes    Test results available and properly labeled: Yes    Pathology report reviewed: Yes    External notes reviewed: Yes    Photo or diagram used for site identification: Yes    Site/side marked: Yes    Slide independently reviewed by Mohs surgeon: Yes   "  Immediately prior to procedure a time out was called: Yes    Patient identity confirmed: verbally with patient  Preparation: Patient was prepped and draped in usual sterile fashion      Anticoagulation:  Is the patient taking prescription anticoagulant and/or aspirin prescribed/recommended by a physician? Yes    Was the anticoagulation regimen changed prior to Mohs? No      Anesthesia:  Anesthesia method: local infiltration  Local anesthetic: lidocaine 1.5% WITH epi.    Procedure Details:  Case ID Number: [id number]  Biopsy accession number: [id number] \"outside path\"  Date of biopsy: 4/30/2025  Pre-Op diagnosis: basal cell carcinoma  BCC subtype: nodular  Surgical site (from skin exam): Nasal Tip  Pre-operative length (cm): 1.2  Pre-operative width (cm): 1  Indications for Mohs surgery: anatomic location where tissue conservation is critical  Previously treated? No      Micrographic Surgery Details:  Post-operative length (cm): 1.5  Post-operative width (cm): 1.3  Number of Mohs stages: 2    Stage 1     Comments: The patient was brought into the operating room and placed in the procedure chair in the appropriate position.  The area positive by previous biopsy was identified and confirmed with the patient. The area of clinically obvious tumor was debulked using a curette and/or scalpel as needed. An incision was made following the Mohs approach through the skin. The specimen was taken to the lab, divided into 2 piece(s) and appropriately chromacoded and processed.  Tumor was seen on both the lateral and deep margins as indicated on the on the Mohs map.  Nodular basal cell carcinoma. Histologic examination revealed large tumor aggregates of atypical basaloid cells with peripheral palisading and tumoral clefting.      Tumor features identified on Mohs section: basal carcinoma      Depth of invasion: dermis    Stage 2     Comments: The area of positivity as noted on the Mohs map in the previous stage was identified and " removed using the Mohs technique. The specimen was taken to the lab and appropriately chromacoded and processed in 1 piece(s).     Tumor features identified on Mohs section: no tumor identified    Depth of defect: subcutaneous fat    Patient tolerance of procedure: tolerated well, no immediate complications    Reconstruction:  Was the defect reconstructed? Yes    Was reconstruction performed by the same Mohs surgeon? Yes    Setting of reconstruction: outpatient office  When was reconstruction performed? same day  Type of reconstruction: linear  Linear reconstruction: complex  Length of linear repair (cm): 3  Subcutaneous Layers (Deep Stitches)   Suture size:  5-0  Suture type:  Vicryl  Stitches:  Buried vertical mattress  Fine/surface layer approximation (top stitches)   Epidermal/Superficial suture size:  5-0  Epidermal/Superficial suture type:  Fast-absorbing gut  Stitches: simple running    Hemostasis achieved with: electrodesiccation  Outcome: patient tolerated procedure well with no complications    Post-procedure details: sterile dressing applied and wound care instructions given    Dressing type: Telfa pad, pressure dressing and Hypafix      Complex Linear Repair - Wide Undermining:  Given the location and size of the defect, it was determined that a complex layered linear closure was required to restore normal anatomy and function. The repair was considered complex because extensive undermining was required and performed. The amount of undermining performed was greater than the maximum width of the defect as measured perpendicular to the closure line along at least one entire edge of the defect. Standing cutaneous cones were removed using Burow's triangles. The wound edges were brought into close approximation with buried vertical mattress sutures. The remainder of the wound was then closed with epidermal top sutures.    The final repair measured 3 cm                Wound care was discussed, and the patient  was given written post-operative wound care instructions.      The patient will follow up with Heather Enamorado MD as needed for any post operative problems or concerns, and will follow up with their primary dermatologist as scheduled.       Tyesha MCNALLY RN  am scribing for, and in the presence of MD ULI Obando Christina Y Wong, MD, personally performed the services described in the documentation as scribed by Tyesha Simpson RN in my presence, and confirm it is both accurate and complete.     Office Visit Note  Date: 5/7/2025  Surgeon:  Heather Enamorado MD  Office Location:  [address] [facility]  [address]  [facility] [address]  [facility] [address]  Dept: [phone]  Dept Fax: [phone]  Referring Provider: Porsche Dickerson PA-C  [address] [place] [address]    Subjective  Radhames Ken is a 89 y.o. male who presents for the following: MOHS Surgery (Nasal Tip- BCC)    According to the patient, the lesion has been present for approximately 6 months at the time of diagnosis.  The lesion is not causing symptoms.  The lesion has not been treated previously.    The patient does not have a pacemaker / defibrillator.  The patient does have a heart valve / joint replacement.    The patient is on blood thinners.  The patient does not have a history of hepatitis B or C.  The patient does not have a history of HIV.  The patient does not have a history of immunosuppression (e.g. organ transplantation, malignancy, medications)    Review of Systems:  No other skin or systemic complaints other than what is documented elsewhere in the note.    MEDICAL HISTORY: clinically relevant history including significant past medical history, medications and allergies was reviewed and documented in Epic.    Objective  Well appearing patient in no apparent distress; mood and affect are within normal limits.  Vital signs: See record.  Noted on the   Nasal Tip  Is a 1.2 x 1 cm scar    The  patient confirmed the identified site.    Discussion:  The nature of the diagnosis was explained. The lesion is a skin cancer.  It has a risk of local growth and distant spread. The condition is associated with sun exposure.  Warning signs of non-melanoma skin cancer discussed. Patient was instructed to perform monthly self skin examination.  We recommended that the patient have regular full skin exams given an increased risk of subsequent skin cancers. The patient was instructed to use sun protective behaviors including use of broad spectrum sunscreens and sun protective clothing to reduce risk of skin cancers.      Risks, benefits, side effects of Mohs surgery were discussed with patient and the patient voiced understanding.  It was explained that even though the cure rate of Mohs is very high it is not 100%. Risks of surgery including but not limited to bleeding, infection, numbness, nerve damage, and scar were reviewed.  Discussion included wound care requirements, activity restrictions, likely scar outcome and time to heal.     After Mohs surgery, the defect may need to be repaired surgically and the scar may be longer than the original lesion.  Reconstruction options, risks, and benefits were reviewed including second intention healing, linear repair (4-1 ratio was explained), local flaps, skin grafts, cartilage grafts and interpolation flaps (the need for multiple surgeries was explained). Possible outcomes were reviewed including likely scar appearance, failure of flap survival, infection, bleeding and the need for revision surgery.     The pathology was reviewed, the photograph was reviewed, and the referring physician's note was reviewed.    Patient elected for Mohs surgery.     Tyesha MCNALLY RN  am scribing for, and in the presence of MD ULI Obando Christina Y Wong, MD, personally performed the services described in the documentation as scribed by Tyesha Simpson RN in my presence, and  confirm it is both accurate and complete.

## 2025-05-07 NOTE — PROGRESS NOTES
Office Visit Note  Date: 5/7/2025  Surgeon:  Heather Enamorado MD  Office Location:  [address] [facility]  [address]  [address]  [facility] [address]  Dept: [phone]  Dept Fax: [phone]  Referring Provider: Porsche Dickerson PA-C  [address] [place] [address]    Subjective   Radhames Ken is a 89 y.o. male who presents for the following: MOHS Surgery (Nasal Tip- BCC)    According to the patient, the lesion has been present for approximately 6 months at the time of diagnosis.  The lesion is not causing symptoms.  The lesion has not been treated previously.    The patient does not have a pacemaker / defibrillator.  The patient does have a heart valve / joint replacement.    The patient is on blood thinners.  The patient does not have a history of hepatitis B or C.  The patient does not have a history of HIV.  The patient does not have a history of immunosuppression (e.g. organ transplantation, malignancy, medications)    Review of Systems:  No other skin or systemic complaints other than what is documented elsewhere in the note.    MEDICAL HISTORY: clinically relevant history including significant past medical history, medications and allergies was reviewed and documented in Epic.    Objective   Well appearing patient in no apparent distress; mood and affect are within normal limits.  Vital signs: See record.  Noted on the   Nasal Tip  Is a 1.2 x 1 cm scar    The patient confirmed the identified site.    Discussion:  The nature of the diagnosis was explained. The lesion is a skin cancer.  It has a risk of local growth and distant spread. The condition is associated with sun exposure.  Warning signs of non-melanoma skin cancer discussed. Patient was instructed to perform monthly self skin examination.  We recommended that the patient have regular full skin exams given an increased risk of subsequent skin cancers. The patient was instructed to use sun protective behaviors  including use of broad spectrum sunscreens and sun protective clothing to reduce risk of skin cancers.      Risks, benefits, side effects of Mohs surgery were discussed with patient and the patient voiced understanding.  It was explained that even though the cure rate of Mohs is very high it is not 100%. Risks of surgery including but not limited to bleeding, infection, numbness, nerve damage, and scar were reviewed.  Discussion included wound care requirements, activity restrictions, likely scar outcome and time to heal.     After Mohs surgery, the defect may need to be repaired surgically and the scar may be longer than the original lesion.  Reconstruction options, risks, and benefits were reviewed including second intention healing, linear repair (4-1 ratio was explained), local flaps, skin grafts, cartilage grafts and interpolation flaps (the need for multiple surgeries was explained). Possible outcomes were reviewed including likely scar appearance, failure of flap survival, infection, bleeding and the need for revision surgery.     The pathology was reviewed, the photograph was reviewed, and the referring physician's note was reviewed.    Patient elected for Mohs surgery.     ITyesha RN  am scribing for, and in the presence of MD ULI Obando Christina Y Wong, MD, personally performed the services described in the documentation as scribed by Tyesha Simpson RN in my presence, and confirm it is both accurate and complete.

## 2025-05-07 NOTE — PROGRESS NOTES
Mohs Surgery Operative Note    Date of Surgery:  5/7/2025  Surgeon:  Heather Enamorado MD  Office Location:  [address] [facility]  [address] [facility]  [address]  [facility] [address]  Dept: [phone]  Dept Fax: [phone]  Referring Provider: Porsche Dickerson PA-C  [address] [place] [address]      Assessment/Plan   Pre-procedure:   Obtained informed consent: written from patient  The surgical site was identified and confirmed with the patient.     Intra-operative:   Audible time out called at : 10:42 AM 05/07/25  by: Tyesha Simpson RN   Verified patient name, birthdate, site, specimen bottle label & requisition.    The planned procedure(s) was again reviewed with the patient. The risks of bleeding, infection, nerve damage and scarring were reviewed. Written authorization was obtained. The patient identity, surgical site, and planned procedure(s) were verified. The provider acted as both surgeon and pathologist.     BASAL CELL CARCINOMA (BCC) OF SKIN OF NOSE  Nasal Tip  Mohs surgery    Consent obtained: written    Universal Protocol:  Procedure explained and questions answered to patient or proxy's satisfaction: Yes    Test results available and properly labeled: Yes    Pathology report reviewed: Yes    External notes reviewed: Yes    Photo or diagram used for site identification: Yes    Site/side marked: Yes    Slide independently reviewed by Mohs surgeon: Yes    Immediately prior to procedure a time out was called: Yes    Patient identity confirmed: verbally with patient  Preparation: Patient was prepped and draped in usual sterile fashion      Anticoagulation:  Is the patient taking prescription anticoagulant and/or aspirin prescribed/recommended by a physician? Yes    Was the anticoagulation regimen changed prior to Mohs? No      Anesthesia:  Anesthesia method: local infiltration  Local anesthetic: lidocaine 1.5% WITH epi.    Procedure Details:  Case ID Number: [id number]  Biopsy  "accession number: [id number] \"outside path\"  Date of biopsy: 4/30/2025  Pre-Op diagnosis: basal cell carcinoma  BCC subtype: nodular  Surgical site (from skin exam): Nasal Tip  Pre-operative length (cm): 1.2  Pre-operative width (cm): 1  Indications for Mohs surgery: anatomic location where tissue conservation is critical  Previously treated? No      Micrographic Surgery Details:  Post-operative length (cm): 1.5  Post-operative width (cm): 1.3  Number of Mohs stages: 2    Stage 1     Comments: The patient was brought into the operating room and placed in the procedure chair in the appropriate position.  The area positive by previous biopsy was identified and confirmed with the patient. The area of clinically obvious tumor was debulked using a curette and/or scalpel as needed. An incision was made following the Mohs approach through the skin. The specimen was taken to the lab, divided into 2 piece(s) and appropriately chromacoded and processed.  Tumor was seen on both the lateral and deep margins as indicated on the on the Mohs map.  Nodular basal cell carcinoma. Histologic examination revealed large tumor aggregates of atypical basaloid cells with peripheral palisading and tumoral clefting.      Tumor features identified on Mohs section: basal carcinoma      Depth of invasion: dermis    Stage 2     Comments: The area of positivity as noted on the Mohs map in the previous stage was identified and removed using the Mohs technique. The specimen was taken to the lab and appropriately chromacoded and processed in 1 piece(s).     Tumor features identified on Mohs section: no tumor identified    Depth of defect: subcutaneous fat    Patient tolerance of procedure: tolerated well, no immediate complications    Reconstruction:  Was the defect reconstructed? Yes    Was reconstruction performed by the same Mohs surgeon? Yes    Setting of reconstruction: outpatient office  When was reconstruction performed? same day  Type of " reconstruction: linear  Linear reconstruction: complex  Length of linear repair (cm): 3  Subcutaneous Layers (Deep Stitches)   Suture size:  5-0  Suture type:  Vicryl  Stitches:  Buried vertical mattress  Fine/surface layer approximation (top stitches)   Epidermal/Superficial suture size:  5-0  Epidermal/Superficial suture type:  Fast-absorbing gut  Stitches: simple running    Hemostasis achieved with: electrodesiccation  Outcome: patient tolerated procedure well with no complications    Post-procedure details: sterile dressing applied and wound care instructions given    Dressing type: Telfa pad, pressure dressing and Hypafix      Complex Linear Repair - Wide Undermining:  Given the location and size of the defect, it was determined that a complex layered linear closure was required to restore normal anatomy and function. The repair was considered complex because extensive undermining was required and performed. The amount of undermining performed was greater than the maximum width of the defect as measured perpendicular to the closure line along at least one entire edge of the defect. Standing cutaneous cones were removed using Burow's triangles. The wound edges were brought into close approximation with buried vertical mattress sutures. The remainder of the wound was then closed with epidermal top sutures.    The final repair measured 3 cm                Wound care was discussed, and the patient was given written post-operative wound care instructions.      The patient will follow up with Heather Enamorado MD as needed for any post operative problems or concerns, and will follow up with their primary dermatologist as scheduled.       Tyesha MCNALLY RN  am scribing for, and in the presence of MD ULI Obando Christina Y Wong, MD, personally performed the services described in the documentation as scribed by Tyesha Simpson RN in my presence, and confirm it is both accurate and complete.

## 2025-05-09 DIAGNOSIS — I26.99 ACUTE PULMONARY EMBOLISM, UNSPECIFIED PULMONARY EMBOLISM TYPE, UNSPECIFIED WHETHER ACUTE COR PULMONALE PRESENT (MULTI): ICD-10-CM

## 2025-05-09 RX ORDER — APIXABAN 5 MG/1
TABLET, FILM COATED ORAL
Qty: 60 TABLET | Refills: 0 | Status: SHIPPED | OUTPATIENT
Start: 2025-05-09

## 2025-05-23 ENCOUNTER — TELEPHONE (OUTPATIENT)
Dept: PRIMARY CARE | Facility: CLINIC | Age: OVER 89
End: 2025-05-23
Payer: MEDICARE

## 2025-05-23 NOTE — TELEPHONE ENCOUNTER
Jodee has left a message 5/22/25 at 5pm  regarding dad  having rectal bleeding .  I have called her at 8am.  Jodee stated that he had bright red blood in his stool .  I had told Jodee that Radhames should go to the ED for work up

## 2025-06-04 ENCOUNTER — APPOINTMENT (OUTPATIENT)
Dept: DERMATOLOGY | Facility: CLINIC | Age: OVER 89
End: 2025-06-04
Payer: MEDICARE

## 2025-06-04 DIAGNOSIS — C44.42 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: Primary | ICD-10-CM

## 2025-06-04 PROCEDURE — 17311 MOHS 1 STAGE H/N/HF/G: CPT | Performed by: DERMATOLOGY

## 2025-06-04 NOTE — PROGRESS NOTES
Mohs Surgery Operative Note    Date of Surgery:  6/4/2025  Surgeon:  Heather Enamorado MD  Office Location:  [address] [facility]  [address] [facility] [address]  [facility] [address]  Dept: [phone]  Dept Fax: [phone]  Referring Provider: Porsche Dickerson PA-C  [address] [place] [address]      Assessment/Plan   Pre-procedure:   Obtained informed consent: written from patient  The surgical site was identified and confirmed with the patient.     Intra-operative:   Audible time out called at : 1:22 PM 06/04/25  by: Dee Dee Silva MA   Verified patient name, birthdate, site, specimen bottle label & requisition.    The planned procedure(s) was again reviewed with the patient. The risks of bleeding, infection, nerve damage and scarring were reviewed. Written authorization was obtained. The patient identity, surgical site, and planned procedure(s) were verified. The provider acted as both surgeon and pathologist.     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  Mid Parietal Scalp  Mohs surgery    Consent obtained: written    Universal Protocol:  Procedure explained and questions answered to patient or proxy's satisfaction: Yes    Test results available and properly labeled: Yes    Pathology report reviewed: Yes    External notes reviewed: Yes    Photo or diagram used for site identification: Yes    Site/side marked: Yes    Slide independently reviewed by Mohs surgeon: Yes    Immediately prior to procedure a time out was called: Yes    Patient identity confirmed: verbally with patient  Preparation: Patient was prepped and draped in usual sterile fashion      Anticoagulation:  Is the patient taking prescription anticoagulant and/or aspirin prescribed/recommended by a physician? Yes    Was the anticoagulation regimen changed prior to Mohs? No      Anesthesia:  Anesthesia method: local infiltration  Local anesthetic: 1.5% lido w/epi.    Procedure Details:  Case ID Number:  [id number]  Biopsy accession number: [id number]  Date of biopsy: 4/30/2025  Pre-Op diagnosis: squamous cell carcinoma  SCC subtype: in situ  Surgical site (from skin exam): Mid Parietal Scalp  Pre-operative length (cm): 4.3  Pre-operative width (cm): 3.2  Indications for Mohs surgery: tumor size greater than 2 cm  Previously treated? No      Micrographic Surgery Details:  Post-operative length (cm): 4.4  Post-operative width (cm): 3.3  Number of Mohs stages: 1    Stage 1     Comments: The patient was brought into the operating room and placed in the procedure chair in the appropriate position.  The area positive by previous biopsy was identified and confirmed with the patient. The area of clinically obvious tumor was debulked using a curette and/or scalpel as needed. An incision was made following the Mohs approach through the skin. The specimen was taken to the lab, divided into 4 piece(s) and appropriately chromacoded and processed.     Tumor features identified on Mohs section: no tumor identified    Depth of defect: subcutaneous fat    Patient tolerance of procedure: tolerated well, no immediate complications    Reconstruction:  Was the defect reconstructed?: No     Repair: After a discussion with the patient regarding the options for wound closure, a decision was made to proceed with second intention healing.    Dressing/Follow-up: Surgifoam was placed in the wound. A pressure dressing was placed to help stabilize the wound and to minimize the risk of postoperative bleeding. Wound care was discussed, and the patient was given written post-operative wound care instructions.        Fine/surface layer approximation (top stitches)   Hemostasis achieved with: Gelfoam and electrodesiccation  Post-procedure details: sterile dressing applied and wound care instructions given    Dressing type: pressure dressing    The final repair measured 4.4 x 3.3 cm          Wound care was discussed, and the patient was given written  post-operative wound care instructions.      The patient will follow up with Heather Enamorado MD as needed for any post operative problems or concerns, and will follow up with their primary dermatologist as scheduled.       IDee Dee MA  am scribing for, and in the presence of Heather Enamorado MD    IHeather MD, personally performed the services described in the documentation as scribed by Dee Dee Silva MA in my presence, and confirm it is both accurate and complete.

## 2025-06-04 NOTE — PROGRESS NOTES
xOffice Visit Note  Date: 6/4/2025  Surgeon:  Heather Enamorado MD  Office Location:  [facility]  [address]  [address]  [facility] [address]  Dept: [phone]  Dept Fax: [phone]  Referring Provider: Porsche Dickerson PA-C  [address] [place] [address]    Subjective   Radhames Ken is a 89 y.o. male who presents for the following: MOHS Surgery (SCCIS - Left Posterior Parietal Scalp)    According to the patient, the lesion has been present for approximately greater than 1 year at the time of diagnosis.  The lesion is not causing symptoms.  The lesion has not been treated previously.    The patient does not have a pacemaker / defibrillator.  The patient does have a heart valve / joint replacement.    The patient is on blood thinners.  The patient does not have a history of hepatitis B or C.  The patient does not have a history of HIV.  The patient does not have a history of immunosuppression (e.g. organ transplantation, malignancy, medications)    Review of Systems:  No other skin or systemic complaints other than what is documented elsewhere in the note.    MEDICAL HISTORY: clinically relevant history including significant past medical history, medications and allergies was reviewed and documented in Epic.    Objective   Well appearing patient in no apparent distress; mood and affect are within normal limits.  Vital signs: See record.  Noted on the   Mid Parietal Scalp  Is a 4.3 x 3.2 cm scar    The patient confirmed the identified site.    Discussion:  The nature of the diagnosis was explained. The lesion is a skin cancer.  It has a risk of local growth and distant spread. The condition is associated with sun exposure.  Warning signs of non-melanoma skin cancer discussed. Patient was instructed to perform monthly self skin examination.  We recommended that the patient have regular full skin exams given an increased risk of subsequent skin cancers. The patient was  instructed to use sun protective behaviors including use of broad spectrum sunscreens and sun protective clothing to reduce risk of skin cancers.      Risks, benefits, side effects of Mohs surgery were discussed with patient and the patient voiced understanding.  It was explained that even though the cure rate of Mohs is very high it is not 100%. Risks of surgery including but not limited to bleeding, infection, numbness, nerve damage, and scar were reviewed.  Discussion included wound care requirements, activity restrictions, likely scar outcome and time to heal.     After Mohs surgery, the defect may need to be repaired surgically and the scar may be longer than the original lesion.  Reconstruction options, risks, and benefits were reviewed including second intention healing, linear repair (4-1 ratio was explained), local flaps, skin grafts, cartilage grafts and interpolation flaps (the need for multiple surgeries was explained). Possible outcomes were reviewed including likely scar appearance, failure of flap survival, infection, bleeding and the need for revision surgery.     The pathology was reviewed, the photograph was reviewed, and the referring physician's note was reviewed.    Patient elected for Mohs surgery.     IDee Dee MA am scribing for, and in the presence of Heather Enamorado MD    IHeather MD, personally performed the services described in the documentation as scribed by Dee Dee Silva MA in my presence, and confirm it is both accurate and complete.

## 2025-06-04 NOTE — LETTER
June 6, 2025     Porsche Dickerson PA-C    Patient: Radhames Ken   YOB: 1933   Date of Visit: 6/4/2025       Dear Dr. Porsche Dickerson PA-C:    Thank you for referring Radhames Ken to me for evaluation. Below are my notes for this consultation.  If you have questions, please do not hesitate to call me. I look forward to following your patient along with you.       Sincerely,     Heather Enamorado MD      CC: No Recipients  ______________________________________________________________________________________    Mohs Surgery Operative Note    Date of Surgery:  6/4/2025  Surgeon:  Heather Enamorado MD  Office Location: [address] [address]  Dept: [phone]  Dept Fax: [phone]  Referring Provider: Porsche Dickerson PA-C  [address] [place] [address]      Assessment/Plan  Pre-procedure:   Obtained informed consent: written from patient  The surgical site was identified and confirmed with the patient.     Intra-operative:   Audible time out called at : 1:22 PM 06/04/25  by: Dee Dee Silva MA   Verified patient name, birthdate, site, specimen bottle label & requisition.    The planned procedure(s) was again reviewed with the patient. The risks of bleeding, infection, nerve damage and scarring were reviewed. Written authorization was obtained. The patient identity, surgical site, and planned procedure(s) were verified. The provider acted as both surgeon and pathologist.     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  Mid Parietal Scalp  Mohs surgery    Consent obtained: written    Universal Protocol:  Procedure explained and questions answered to patient or proxy's satisfaction: Yes    Test results available and properly labeled: Yes    Pathology report reviewed: Yes    External notes reviewed: Yes    Photo or diagram used for site identification: Yes    Site/side marked: Yes    Slide independently reviewed by  Mohs surgeon: Yes    Immediately prior to procedure a time out was called: Yes    Patient identity confirmed: verbally with patient  Preparation: Patient was prepped and draped in usual sterile fashion      Anticoagulation:  Is the patient taking prescription anticoagulant and/or aspirin prescribed/recommended by a physician? Yes    Was the anticoagulation regimen changed prior to Mohs? No      Anesthesia:  Anesthesia method: local infiltration  Local anesthetic: 1.5% lido w/epi.    Procedure Details:  Case ID Number: [id number]  Biopsy accession number: [id number]  Date of biopsy: 4/30/2025  Pre-Op diagnosis: squamous cell carcinoma  SCC subtype: in situ  Surgical site (from skin exam): Mid Parietal Scalp  Pre-operative length (cm): 4.3  Pre-operative width (cm): 3.2  Indications for Mohs surgery: tumor size greater than 2 cm  Previously treated? No      Micrographic Surgery Details:  Post-operative length (cm): 4.4  Post-operative width (cm): 3.3  Number of Mohs stages: 1    Stage 1     Comments: The patient was brought into the operating room and placed in the procedure chair in the appropriate position.  The area positive by previous biopsy was identified and confirmed with the patient. The area of clinically obvious tumor was debulked using a curette and/or scalpel as needed. An incision was made following the Mohs approach through the skin. The specimen was taken to the lab, divided into 4 piece(s) and appropriately chromacoded and processed.     Tumor features identified on Mohs section: no tumor identified    Depth of defect: subcutaneous fat    Patient tolerance of procedure: tolerated well, no immediate complications    Reconstruction:  Was the defect reconstructed?: No     Repair: After a discussion with the patient regarding the options for wound closure, a decision was made to proceed with second intention healing.    Dressing/Follow-up: Surgifoam was placed in the wound. A pressure dressing was placed  to help stabilize the wound and to minimize the risk of postoperative bleeding. Wound care was discussed, and the patient was given written post-operative wound care instructions.        Fine/surface layer approximation (top stitches)   Hemostasis achieved with: Gelfoam and electrodesiccation  Post-procedure details: sterile dressing applied and wound care instructions given    Dressing type: pressure dressing    The final repair measured 4.4 x 3.3 cm          Wound care was discussed, and the patient was given written post-operative wound care instructions.      The patient will follow up with Heather Enamorado MD as needed for any post operative problems or concerns, and will follow up with their primary dermatologist as scheduled.       Dee Dee MCNALLY MA  am scribing for, and in the presence of MD ULI Obando Christina Y Wong, MD, personally performed the services described in the documentation as scribed by Dee Dee Silva MA in my presence, and confirm it is both accurate and complete.

## 2025-06-09 DIAGNOSIS — R05.9 COUGH, UNSPECIFIED TYPE: ICD-10-CM

## 2025-06-09 DIAGNOSIS — J44.1 COPD EXACERBATION (MULTI): ICD-10-CM

## 2025-06-09 RX ORDER — FLUTICASONE FUROATE 100 UG/1
1 POWDER RESPIRATORY (INHALATION) DAILY
Qty: 30 EACH | Refills: 3 | Status: SHIPPED | OUTPATIENT
Start: 2025-06-09

## 2025-06-10 DIAGNOSIS — I26.99 ACUTE PULMONARY EMBOLISM, UNSPECIFIED PULMONARY EMBOLISM TYPE, UNSPECIFIED WHETHER ACUTE COR PULMONALE PRESENT (MULTI): ICD-10-CM

## 2025-06-10 RX ORDER — APIXABAN 5 MG/1
TABLET, FILM COATED ORAL
Qty: 60 TABLET | Refills: 0 | Status: SHIPPED | OUTPATIENT
Start: 2025-06-10

## 2025-06-18 ENCOUNTER — APPOINTMENT (OUTPATIENT)
Dept: CARDIOLOGY | Facility: HOSPITAL | Age: OVER 89
End: 2025-06-18
Payer: MEDICARE

## 2025-06-18 ENCOUNTER — APPOINTMENT (OUTPATIENT)
Dept: RADIOLOGY | Facility: HOSPITAL | Age: OVER 89
End: 2025-06-18
Payer: MEDICARE

## 2025-06-18 ENCOUNTER — HOSPITAL ENCOUNTER (EMERGENCY)
Facility: HOSPITAL | Age: OVER 89
Discharge: HOME | End: 2025-06-18
Attending: EMERGENCY MEDICINE
Payer: MEDICARE

## 2025-06-18 VITALS
WEIGHT: 180.78 LBS | OXYGEN SATURATION: 98 % | DIASTOLIC BLOOD PRESSURE: 98 MMHG | SYSTOLIC BLOOD PRESSURE: 168 MMHG | RESPIRATION RATE: 16 BRPM | HEIGHT: 70 IN | HEART RATE: 75 BPM | TEMPERATURE: 97.3 F | BODY MASS INDEX: 25.88 KG/M2

## 2025-06-18 DIAGNOSIS — E86.0 DEHYDRATION: ICD-10-CM

## 2025-06-18 DIAGNOSIS — N18.31 STAGE 3A CHRONIC KIDNEY DISEASE (MULTI): ICD-10-CM

## 2025-06-18 DIAGNOSIS — N30.01 ACUTE CYSTITIS WITH HEMATURIA: Primary | ICD-10-CM

## 2025-06-18 LAB
ALBUMIN SERPL BCP-MCNC: 4 G/DL (ref 3.4–5)
ALP SERPL-CCNC: 137 U/L (ref 33–136)
ALT SERPL W P-5'-P-CCNC: 13 U/L (ref 10–52)
ANION GAP SERPL CALC-SCNC: 15 MMOL/L (ref 10–20)
APPEARANCE UR: CLEAR
AST SERPL W P-5'-P-CCNC: 12 U/L (ref 9–39)
BACTERIA #/AREA URNS AUTO: ABNORMAL /HPF
BASOPHILS # BLD AUTO: 0.07 X10*3/UL (ref 0–0.1)
BASOPHILS NFR BLD AUTO: 0.9 %
BILIRUB SERPL-MCNC: 2 MG/DL (ref 0–1.2)
BILIRUB UR STRIP.AUTO-MCNC: NEGATIVE MG/DL
BNP SERPL-MCNC: 119 PG/ML (ref 0–99)
BUN SERPL-MCNC: 24 MG/DL (ref 6–23)
CALCIUM SERPL-MCNC: 9.4 MG/DL (ref 8.6–10.3)
CARDIAC TROPONIN I PNL SERPL HS: 18 NG/L (ref 0–20)
CARDIAC TROPONIN I PNL SERPL HS: 19 NG/L (ref 0–20)
CHLORIDE SERPL-SCNC: 98 MMOL/L (ref 98–107)
CO2 SERPL-SCNC: 25 MMOL/L (ref 21–32)
COLOR UR: ABNORMAL
CREAT SERPL-MCNC: 1.39 MG/DL (ref 0.5–1.3)
EGFRCR SERPLBLD CKD-EPI 2021: 48 ML/MIN/1.73M*2
EOSINOPHIL # BLD AUTO: 0.68 X10*3/UL (ref 0–0.4)
EOSINOPHIL NFR BLD AUTO: 8.8 %
ERYTHROCYTE [DISTWIDTH] IN BLOOD BY AUTOMATED COUNT: 13.6 % (ref 11.5–14.5)
GLUCOSE SERPL-MCNC: 105 MG/DL (ref 74–99)
GLUCOSE UR STRIP.AUTO-MCNC: NORMAL MG/DL
HCT VFR BLD AUTO: 43.3 % (ref 41–52)
HGB BLD-MCNC: 14.6 G/DL (ref 13.5–17.5)
HYALINE CASTS #/AREA URNS AUTO: ABNORMAL /LPF
IMM GRANULOCYTES # BLD AUTO: 0.03 X10*3/UL (ref 0–0.5)
IMM GRANULOCYTES NFR BLD AUTO: 0.4 % (ref 0–0.9)
KETONES UR STRIP.AUTO-MCNC: NEGATIVE MG/DL
LEUKOCYTE ESTERASE UR QL STRIP.AUTO: NEGATIVE
LYMPHOCYTES # BLD AUTO: 1.2 X10*3/UL (ref 0.8–3)
LYMPHOCYTES NFR BLD AUTO: 15.6 %
MAGNESIUM SERPL-MCNC: 2.12 MG/DL (ref 1.6–2.4)
MCH RBC QN AUTO: 30 PG (ref 26–34)
MCHC RBC AUTO-ENTMCNC: 33.7 G/DL (ref 32–36)
MCV RBC AUTO: 89 FL (ref 80–100)
MONOCYTES # BLD AUTO: 0.64 X10*3/UL (ref 0.05–0.8)
MONOCYTES NFR BLD AUTO: 8.3 %
MUCOUS THREADS #/AREA URNS AUTO: ABNORMAL /LPF
NEUTROPHILS # BLD AUTO: 5.09 X10*3/UL (ref 1.6–5.5)
NEUTROPHILS NFR BLD AUTO: 66 %
NITRITE UR QL STRIP.AUTO: NEGATIVE
NRBC BLD-RTO: 0 /100 WBCS (ref 0–0)
PH UR STRIP.AUTO: 6 [PH]
PLATELET # BLD AUTO: 141 X10*3/UL (ref 150–450)
POTASSIUM SERPL-SCNC: 4 MMOL/L (ref 3.5–5.3)
PROT SERPL-MCNC: 6.2 G/DL (ref 6.4–8.2)
PROT UR STRIP.AUTO-MCNC: NEGATIVE MG/DL
RBC # BLD AUTO: 4.87 X10*6/UL (ref 4.5–5.9)
RBC # UR STRIP.AUTO: ABNORMAL MG/DL
RBC #/AREA URNS AUTO: ABNORMAL /HPF
SODIUM SERPL-SCNC: 134 MMOL/L (ref 136–145)
SP GR UR STRIP.AUTO: 1.01
UROBILINOGEN UR STRIP.AUTO-MCNC: NORMAL MG/DL
WBC # BLD AUTO: 7.7 X10*3/UL (ref 4.4–11.3)
WBC #/AREA URNS AUTO: ABNORMAL /HPF

## 2025-06-18 PROCEDURE — 83880 ASSAY OF NATRIURETIC PEPTIDE: CPT | Performed by: EMERGENCY MEDICINE

## 2025-06-18 PROCEDURE — 84484 ASSAY OF TROPONIN QUANT: CPT | Performed by: EMERGENCY MEDICINE

## 2025-06-18 PROCEDURE — 83735 ASSAY OF MAGNESIUM: CPT | Performed by: EMERGENCY MEDICINE

## 2025-06-18 PROCEDURE — 51798 US URINE CAPACITY MEASURE: CPT

## 2025-06-18 PROCEDURE — 85025 COMPLETE CBC W/AUTO DIFF WBC: CPT | Performed by: EMERGENCY MEDICINE

## 2025-06-18 PROCEDURE — 96360 HYDRATION IV INFUSION INIT: CPT

## 2025-06-18 PROCEDURE — 71045 X-RAY EXAM CHEST 1 VIEW: CPT | Performed by: RADIOLOGY

## 2025-06-18 PROCEDURE — 2500000004 HC RX 250 GENERAL PHARMACY W/ HCPCS (ALT 636 FOR OP/ED): Performed by: EMERGENCY MEDICINE

## 2025-06-18 PROCEDURE — 2500000001 HC RX 250 WO HCPCS SELF ADMINISTERED DRUGS (ALT 637 FOR MEDICARE OP): Performed by: EMERGENCY MEDICINE

## 2025-06-18 PROCEDURE — 36415 COLL VENOUS BLD VENIPUNCTURE: CPT | Performed by: EMERGENCY MEDICINE

## 2025-06-18 PROCEDURE — 81001 URINALYSIS AUTO W/SCOPE: CPT | Performed by: EMERGENCY MEDICINE

## 2025-06-18 PROCEDURE — 99285 EMERGENCY DEPT VISIT HI MDM: CPT | Performed by: EMERGENCY MEDICINE

## 2025-06-18 PROCEDURE — 71045 X-RAY EXAM CHEST 1 VIEW: CPT

## 2025-06-18 PROCEDURE — 93005 ELECTROCARDIOGRAM TRACING: CPT

## 2025-06-18 PROCEDURE — 80053 COMPREHEN METABOLIC PANEL: CPT | Performed by: EMERGENCY MEDICINE

## 2025-06-18 RX ORDER — CEPHALEXIN 250 MG/1
500 CAPSULE ORAL ONCE
Status: COMPLETED | OUTPATIENT
Start: 2025-06-18 | End: 2025-06-18

## 2025-06-18 RX ORDER — CEPHALEXIN 500 MG/1
500 CAPSULE ORAL 3 TIMES DAILY
Qty: 21 CAPSULE | Refills: 0 | Status: SHIPPED | OUTPATIENT
Start: 2025-06-18 | End: 2025-06-25

## 2025-06-18 RX ADMIN — SODIUM CHLORIDE 500 ML: 0.9 INJECTION, SOLUTION INTRAVENOUS at 20:53

## 2025-06-18 RX ADMIN — CEPHALEXIN 500 MG: 250 CAPSULE ORAL at 23:48

## 2025-06-18 ASSESSMENT — PAIN SCALES - GENERAL
PAINLEVEL_OUTOF10: 0 - NO PAIN

## 2025-06-18 ASSESSMENT — PAIN - FUNCTIONAL ASSESSMENT: PAIN_FUNCTIONAL_ASSESSMENT: 0-10

## 2025-06-18 NOTE — ED TRIAGE NOTES
Family states he has been weak today and did not eat or drink much today. She states he has been getting dizzy often and is worried about him having some low blood pressures and low heart rates at times.

## 2025-06-18 NOTE — ED PROVIDER NOTES
Emergency Department Provider Note       History of Present Illness     History provided by: Patient  Limitations to History: None  External Records Reviewed with Brief Summary: None    HPI:  Radhames Ken is a 89 y.o. male presents to the emergency department brought in by daughter for evaluation.  She states that the blood pressure has been reading low and the heart rate has been low and last week he had an episode where he was not breathing.  They have been monitoring it.  They did stop the metoprolol.  She is concerned that he is also dehydrated.  Patient denies any complaints.  Denies feeling lightheaded at present time.  Denies any spinning sensation.    Physical Exam   Triage vitals:  T 36.3 °C (97.3 °F)  HR 76  /60  RR 16  O2 96 % None (Room air)    Physical Exam  HENT:      Head: Normocephalic and atraumatic.      Mouth/Throat:      Mouth: Mucous membranes are moist.   Eyes:      Extraocular Movements: Extraocular movements intact.      Pupils: Pupils are equal, round, and reactive to light.   Cardiovascular:      Rate and Rhythm: Normal rate and regular rhythm.      Comments: Regular rhythm with interposed irregular beats  Pulmonary:      Effort: Pulmonary effort is normal.      Breath sounds: Normal breath sounds.   Abdominal:      Palpations: Abdomen is soft.      Tenderness: There is no abdominal tenderness.   Musculoskeletal:         General: Normal range of motion.      Comments: Lateral symmetrical +1 pitting edema   Skin:     General: Skin is warm and dry.   Neurological:      General: No focal deficit present.      Mental Status: He is alert.   Psychiatric:         Behavior: Behavior normal.           Medical Decision Making & ED Course   Medical Decision Makin y.o. male presents to the emergency department with concerns of bradycardia and low blood pressure.  He had his metoprolol stopped a few days ago.  He has not been bradycardic or hypotensive in the emergency  department.  ---- Please troponins are 18 and 19 which are at baseline.  White count is 7.7 with normal H&H and platelet count.  He does have chronic kidney disease stage III which is also at baseline.  His BNP is 119.  Urine reveals +1 bacteria and few white cells and red cells.  He may have an early urinary tract infection.  EKG shows PVCs but no ischemia.  Chest x-ray reveals no evidence of acute cardiopulmonary disease.  At present time there is no indication for admission.  The patient will be covered with antibiotics.  He has been hydrated and feels better.  He appears slightly dehydrated.  The patient will be discharged home on Keflex.  He is to follow-up with primary care.  He is to return for any problems.      Differential diagnoses considered include but are not limited to: Dehydration, UTI, medication sensitivity, infectious process,    Social Determinants of Health which Significantly Impact Care: Social Determinants of Health which Significantly Impact Care: None identified     EKG Independent Interpretation: EKG interpreted by myself. Please see ED Course for full interpretation.    Independent Result Review and Interpretation: Relevant laboratory and radiographic results were reviewed and independently interpreted by myself.  As necessary, they are commented on in the ED Course.    Chronic conditions affecting the patient's care: As documented above in [facility]    The patient was discussed with the following consultants/services: None    Care Considerations: As documented above in [facility]    ED Course:  ED Course as of 06/18/25 2342 Wed Jun 18, 2025 1940 EKG which I reviewed and impelled interpreted done at 1928 reveals a normal sinus rhythm at a rate of 76 with incomplete right bundle branch block, left axis deviation, multiple premature supraventricular complexes and premature ventricular complexes.  No ischemia, no STEMI. [RM]      ED Course User Index  [RM] Doug Granger MD         Diagnoses as of  06/18/25 2342   Acute cystitis with hematuria   Dehydration   Stage 3a chronic kidney disease (Multi)       Disposition   As a result of the work-up, the patient was discharged home.  he was informed of his diagnosis and instructed to come back with any concerns or worsening of condition.  he and was agreeable to the plan as discussed above.  he was given the opportunity to ask questions.  All of the patient's questions were answered.    Procedures   Procedures        Doug Granger MD  Emergency Medicine                                                       Doug Granger MD  06/18/25 9466

## 2025-06-19 LAB — HOLD SPECIMEN: NORMAL

## 2025-07-07 ENCOUNTER — HOSPITAL ENCOUNTER (EMERGENCY)
Facility: HOSPITAL | Age: OVER 89
Discharge: HOME | End: 2025-07-07
Attending: EMERGENCY MEDICINE
Payer: MEDICARE

## 2025-07-07 ENCOUNTER — APPOINTMENT (OUTPATIENT)
Dept: CARDIOLOGY | Facility: HOSPITAL | Age: OVER 89
End: 2025-07-07
Payer: MEDICARE

## 2025-07-07 ENCOUNTER — APPOINTMENT (OUTPATIENT)
Dept: RADIOLOGY | Facility: HOSPITAL | Age: OVER 89
End: 2025-07-07
Payer: MEDICARE

## 2025-07-07 VITALS
DIASTOLIC BLOOD PRESSURE: 85 MMHG | HEIGHT: 71 IN | HEART RATE: 89 BPM | OXYGEN SATURATION: 96 % | TEMPERATURE: 98.5 F | WEIGHT: 176 LBS | SYSTOLIC BLOOD PRESSURE: 137 MMHG | RESPIRATION RATE: 18 BRPM | BODY MASS INDEX: 24.64 KG/M2

## 2025-07-07 DIAGNOSIS — N39.0 URINARY TRACT INFECTION WITHOUT HEMATURIA, SITE UNSPECIFIED: Primary | ICD-10-CM

## 2025-07-07 DIAGNOSIS — R25.1 TREMOR: ICD-10-CM

## 2025-07-07 LAB
ALBUMIN SERPL BCP-MCNC: 3.2 G/DL (ref 3.4–5)
ALP SERPL-CCNC: 108 U/L (ref 33–136)
ALT SERPL W P-5'-P-CCNC: 11 U/L (ref 10–52)
ANION GAP BLDV CALCULATED.4IONS-SCNC: 5 MMOL/L (ref 10–25)
ANION GAP SERPL CALC-SCNC: 11 MMOL/L (ref 10–20)
APPEARANCE UR: CLEAR
AST SERPL W P-5'-P-CCNC: 14 U/L (ref 9–39)
BASE EXCESS BLDV CALC-SCNC: 5.7 MMOL/L (ref -2–3)
BASOPHILS # BLD AUTO: 0.07 X10*3/UL (ref 0–0.1)
BASOPHILS NFR BLD AUTO: 1 %
BILIRUB SERPL-MCNC: 1.3 MG/DL (ref 0–1.2)
BILIRUB UR STRIP.AUTO-MCNC: NEGATIVE MG/DL
BODY TEMPERATURE: ABNORMAL
BUN SERPL-MCNC: 17 MG/DL (ref 6–23)
CA-I BLDV-SCNC: 1.2 MMOL/L (ref 1.1–1.33)
CALCIUM SERPL-MCNC: 8.8 MG/DL (ref 8.6–10.3)
CARDIAC TROPONIN I PNL SERPL HS: 20 NG/L (ref 0–20)
CARDIAC TROPONIN I PNL SERPL HS: 24 NG/L (ref 0–20)
CHLORIDE BLDV-SCNC: 99 MMOL/L (ref 98–107)
CHLORIDE SERPL-SCNC: 101 MMOL/L (ref 98–107)
CO2 SERPL-SCNC: 27 MMOL/L (ref 21–32)
COLOR UR: ABNORMAL
CREAT SERPL-MCNC: 1.18 MG/DL (ref 0.5–1.3)
EGFRCR SERPLBLD CKD-EPI 2021: 58 ML/MIN/1.73M*2
EOSINOPHIL # BLD AUTO: 0.39 X10*3/UL (ref 0–0.4)
EOSINOPHIL NFR BLD AUTO: 5.7 %
ERYTHROCYTE [DISTWIDTH] IN BLOOD BY AUTOMATED COUNT: 13.5 % (ref 11.5–14.5)
GLUCOSE BLD MANUAL STRIP-MCNC: 124 MG/DL (ref 74–99)
GLUCOSE BLDV-MCNC: 133 MG/DL (ref 74–99)
GLUCOSE SERPL-MCNC: 128 MG/DL (ref 74–99)
GLUCOSE UR STRIP.AUTO-MCNC: NORMAL MG/DL
HCO3 BLDV-SCNC: 29.8 MMOL/L (ref 22–26)
HCT VFR BLD AUTO: 37.2 % (ref 41–52)
HCT VFR BLD EST: 54 % (ref 41–52)
HGB BLD-MCNC: 12.4 G/DL (ref 13.5–17.5)
HGB BLDV-MCNC: 17.9 G/DL (ref 13.5–17.5)
IMM GRANULOCYTES # BLD AUTO: 0.02 X10*3/UL (ref 0–0.5)
IMM GRANULOCYTES NFR BLD AUTO: 0.3 % (ref 0–0.9)
INHALED O2 CONCENTRATION: 21 %
KETONES UR STRIP.AUTO-MCNC: NEGATIVE MG/DL
LACTATE BLDV-SCNC: 2.2 MMOL/L (ref 0.4–2)
LEUKOCYTE ESTERASE UR QL STRIP.AUTO: ABNORMAL
LYMPHOCYTES # BLD AUTO: 0.84 X10*3/UL (ref 0.8–3)
LYMPHOCYTES NFR BLD AUTO: 12.4 %
MAGNESIUM SERPL-MCNC: 2.04 MG/DL (ref 1.6–2.4)
MCH RBC QN AUTO: 29.5 PG (ref 26–34)
MCHC RBC AUTO-ENTMCNC: 33.3 G/DL (ref 32–36)
MCV RBC AUTO: 89 FL (ref 80–100)
MONOCYTES # BLD AUTO: 0.6 X10*3/UL (ref 0.05–0.8)
MONOCYTES NFR BLD AUTO: 8.8 %
NEUTROPHILS # BLD AUTO: 4.88 X10*3/UL (ref 1.6–5.5)
NEUTROPHILS NFR BLD AUTO: 71.8 %
NITRITE UR QL STRIP.AUTO: NEGATIVE
NRBC BLD-RTO: 0 /100 WBCS (ref 0–0)
OXYHGB MFR BLDV: 79.4 % (ref 45–75)
PCO2 BLDV: 40 MM HG (ref 41–51)
PH BLDV: 7.48 PH (ref 7.33–7.43)
PH UR STRIP.AUTO: 6.5 [PH]
PLATELET # BLD AUTO: 130 X10*3/UL (ref 150–450)
PO2 BLDV: 46 MM HG (ref 35–45)
POTASSIUM BLDV-SCNC: 3.5 MMOL/L (ref 3.5–5.3)
POTASSIUM SERPL-SCNC: 3.6 MMOL/L (ref 3.5–5.3)
PROT SERPL-MCNC: 5.3 G/DL (ref 6.4–8.2)
PROT UR STRIP.AUTO-MCNC: NEGATIVE MG/DL
RBC # BLD AUTO: 4.2 X10*6/UL (ref 4.5–5.9)
RBC # UR STRIP.AUTO: ABNORMAL MG/DL
RBC #/AREA URNS AUTO: ABNORMAL /HPF
SAO2 % BLDV: 82 % (ref 45–75)
SODIUM BLDV-SCNC: 130 MMOL/L (ref 136–145)
SODIUM SERPL-SCNC: 135 MMOL/L (ref 136–145)
SP GR UR STRIP.AUTO: 1.01
SQUAMOUS #/AREA URNS AUTO: ABNORMAL /HPF
UROBILINOGEN UR STRIP.AUTO-MCNC: NORMAL MG/DL
WBC # BLD AUTO: 6.8 X10*3/UL (ref 4.4–11.3)
WBC #/AREA URNS AUTO: ABNORMAL /HPF

## 2025-07-07 PROCEDURE — 84132 ASSAY OF SERUM POTASSIUM: CPT | Mod: 59 | Performed by: EMERGENCY MEDICINE

## 2025-07-07 PROCEDURE — 84484 ASSAY OF TROPONIN QUANT: CPT | Performed by: EMERGENCY MEDICINE

## 2025-07-07 PROCEDURE — 81003 URINALYSIS AUTO W/O SCOPE: CPT | Performed by: EMERGENCY MEDICINE

## 2025-07-07 PROCEDURE — 85025 COMPLETE CBC W/AUTO DIFF WBC: CPT | Performed by: EMERGENCY MEDICINE

## 2025-07-07 PROCEDURE — 99285 EMERGENCY DEPT VISIT HI MDM: CPT | Mod: 25 | Performed by: EMERGENCY MEDICINE

## 2025-07-07 PROCEDURE — 2500000004 HC RX 250 GENERAL PHARMACY W/ HCPCS (ALT 636 FOR OP/ED): Performed by: STUDENT IN AN ORGANIZED HEALTH CARE EDUCATION/TRAINING PROGRAM

## 2025-07-07 PROCEDURE — 93005 ELECTROCARDIOGRAM TRACING: CPT

## 2025-07-07 PROCEDURE — 82947 ASSAY GLUCOSE BLOOD QUANT: CPT | Mod: 59

## 2025-07-07 PROCEDURE — 71250 CT THORAX DX C-: CPT

## 2025-07-07 PROCEDURE — 84132 ASSAY OF SERUM POTASSIUM: CPT | Performed by: EMERGENCY MEDICINE

## 2025-07-07 PROCEDURE — 70450 CT HEAD/BRAIN W/O DYE: CPT | Performed by: RADIOLOGY

## 2025-07-07 PROCEDURE — 2500000001 HC RX 250 WO HCPCS SELF ADMINISTERED DRUGS (ALT 637 FOR MEDICARE OP): Performed by: STUDENT IN AN ORGANIZED HEALTH CARE EDUCATION/TRAINING PROGRAM

## 2025-07-07 PROCEDURE — 36415 COLL VENOUS BLD VENIPUNCTURE: CPT | Performed by: EMERGENCY MEDICINE

## 2025-07-07 PROCEDURE — 83735 ASSAY OF MAGNESIUM: CPT | Performed by: EMERGENCY MEDICINE

## 2025-07-07 PROCEDURE — 70450 CT HEAD/BRAIN W/O DYE: CPT

## 2025-07-07 PROCEDURE — 71250 CT THORAX DX C-: CPT | Performed by: RADIOLOGY

## 2025-07-07 PROCEDURE — 87086 URINE CULTURE/COLONY COUNT: CPT | Mod: GENLAB | Performed by: EMERGENCY MEDICINE

## 2025-07-07 RX ORDER — CEPHALEXIN 250 MG/1
500 CAPSULE ORAL ONCE
Status: COMPLETED | OUTPATIENT
Start: 2025-07-07 | End: 2025-07-07

## 2025-07-07 RX ORDER — CEPHALEXIN 500 MG/1
500 CAPSULE ORAL 2 TIMES DAILY
Qty: 14 CAPSULE | Refills: 0 | Status: SHIPPED | OUTPATIENT
Start: 2025-07-07 | End: 2025-07-14

## 2025-07-07 RX ADMIN — SODIUM CHLORIDE 250 ML: 0.9 INJECTION, SOLUTION INTRAVENOUS at 15:52

## 2025-07-07 RX ADMIN — CEPHALEXIN 500 MG: 250 CAPSULE ORAL at 17:25

## 2025-07-07 ASSESSMENT — PAIN SCALES - GENERAL
PAINLEVEL_OUTOF10: 0 - NO PAIN

## 2025-07-07 ASSESSMENT — PAIN - FUNCTIONAL ASSESSMENT
PAIN_FUNCTIONAL_ASSESSMENT: 0-10
PAIN_FUNCTIONAL_ASSESSMENT: 0-10

## 2025-07-07 NOTE — ED NOTES
Patient in with family after he has had multiple episodes of full body shaking. Family concerened about seizures.     Chata Eaton RN  07/07/25 0506

## 2025-07-07 NOTE — ED PROVIDER NOTES
HPI   Chief Complaint   Patient presents with    Shaking       HPI  Patient is a 89-year-old male with history of Alzheimer's dementia, brought to the ED today by his wife and daughter for shaking episodes, with concerns of seizures.  Family at the bedside states that patient does not have a prior history of seizures.  They note that over the past several days, he has had 3 total episodes of shaking.  They state that his other episodes were just shaking of the legs.  Today however, patient had an episode where his legs started shaking, followed by his arms.  These episodes lasted several minutes before self resolving.  Patient was in bed during these episodes and remained alert, talkative, and communicative.  Patient never lost consciousness.  He did not have any change in mental status.  However, family was concerned patient may be having seizures, so they brought him to the ED for further evaluation.  They deny any recent fever or cough.  Patient himself has no complaints.  He denies any chest pain, shortness of breath, abdominal pain, nausea, vomiting, or changes in bowel or bladder habits.      Patient History   Medical History[1]  Surgical History[2]  Family History[3]  Social History[4]    Physical Exam   ED Triage Vitals [07/07/25 1342]   Temperature Heart Rate Respirations BP   36.8 °C (98.3 °F) 82 16 133/69      Pulse Ox Temp src Heart Rate Source Patient Position   96 % -- -- --      BP Location FiO2 (%)     -- --       Physical Exam  Vitals and nursing note reviewed.   Constitutional:       General: He is not in acute distress.     Appearance: He is not toxic-appearing.   HENT:      Head: Normocephalic.      Mouth/Throat:      Mouth: Mucous membranes are moist.   Eyes:      Extraocular Movements: Extraocular movements intact.      Conjunctiva/sclera: Conjunctivae normal.   Cardiovascular:      Rate and Rhythm: Normal rate and regular rhythm.      Pulses: Normal pulses.   Pulmonary:      Effort: Pulmonary  effort is normal. No respiratory distress.      Breath sounds: Normal breath sounds. No wheezing.   Abdominal:      General: There is no distension.      Palpations: Abdomen is soft.      Tenderness: There is no abdominal tenderness. There is no guarding or rebound.   Musculoskeletal:         General: No swelling.      Cervical back: Neck supple.   Skin:     General: Skin is warm and dry.      Capillary Refill: Capillary refill takes less than 2 seconds.   Neurological:      Mental Status: He is alert. Mental status is at baseline.      Comments: This patient is awake, alert and oriented only to self, consistent with baseline. Speech is clear and fluent. Cranial nerves II-XII are grossly intact. Strength and sensation are intact in all extremities. No pronator drift. Intention tremor present.           ED Course & MDM   ED Course as of 07/07/25 1513   Mon Jul 07, 2025   1457 EKG obtained at 1350, interpreted by myself.  Normal sinus rhythm with a ventricular rate of 86, left axis deviation, right bundle ranch block present, otherwise normal intervals with no acute ischemic changes [VT]      ED Course User Index  [VT] Sabiha CHAVEZ MD             No data recorded     Reardan Coma Scale Score: 15 (07/07/25 1346 : Chata Eaton, SHAY)                         Medical Decision Making  Patient was seen and evaluated for episodes of shaking at home.  At this time, patient is asymptomatic.  Patient has no complaints.  Patient is placed on a cardiac monitor with continuous pulse ox.  Additional lab work and imaging ordered for further evaluation of his symptoms.    CBC is unremarkable.  CMP is unremarkable.  High-sensitivity troponin is slightly elevated at 24.  Magnesium is normal at 2.    CT head wo IV contrast   Final Result   No acute intracranial abnormalities. Bilateral occipital lobe   encephalomalacia.        Generalized cerebral and cerebellar atrophy.             MACRO:   none        Signed by: Magdalena Wilks 7/7/2025 2:45  PM   Dictation workstation:   [id number]      CT chest wo IV contrast   Final Result   1. No infiltrate in the chest.        2. Hepatic cysts are demonstrated in particular left lobe of the   liver as seen on prior imaging        3. Small sliding hiatal hernia. There is reflux demonstrated within   the esophagus.             MACRO:   None        Signed by: Demetrice Silva 7/7/2025 3:05 PM   Dictation workstation:   [id number]        At this time, additional lab work remains pending.  Patient will be signed out to the oncoming physician, Dr. Llanos, pending the remainder of his workup and further disposition.    Procedure  Procedures       [1]   Past Medical History:  Diagnosis Date    CHF (congestive heart failure)     Chronic sinusitis, unspecified 03/28/2017    Sinobronchitis    COVID-19 05/05/2022    COVID-19    COVID-19 05/05/2022    Pneumonia due to COVID-19 virus    Eczema     Elevated prostate specific antigen (PSA)     Rising PSA level    Encounter for screening for malignant neoplasm, site unspecified 07/24/2018    Cancer screening    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    Diarrhea, functional    Heart disease     HL (hearing loss)     Hypertension     Other disorders of plasma-protein metabolism, not elsewhere classified 05/30/2017    Serum albumin decreased    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    Personal history of diseases of the skin and subcutaneous tissue 02/18/2019    History of eczema    Personal history of other diseases of male genital organs     History of BPH    Personal history of other diseases of the circulatory system     History of hypertension    Personal history of other diseases of the circulatory system     History of mitral valve insufficiency    Personal history of other diseases of the musculoskeletal system and connective tissue     Personal history of arthritis    Personal history of other endocrine, nutritional and metabolic disease     History  of hyperlipidemia    Personal history of other specified conditions 11/09/2018    History of seizure    Personal history of other specified conditions 05/05/2022    History of vomiting    Stroke (Multi)     Visual impairment    [2]   Past Surgical History:  Procedure Laterality Date    CARDIAC SURGERY  04/28/2014    Heart Surgery    CHOLECYSTECTOMY  04/28/2014    Cholecystectomy    GALLBLADDER SURGERY  04/28/2014    Gallbladder Surgery    MR HEAD ANGIO WO IV CONTRAST  9/30/2018    MR HEAD ANGIO WO IV CONTRAST 9/30/2018 [facility] CLINICAL LEGACY    MR NECK ANGIO WO IV CONTRAST  9/30/2018    MR NECK ANGIO WO IV CONTRAST 9/30/2018 [facility] CLINICAL LEGACY    OTHER SURGICAL HISTORY  04/28/2014    Knee Repair    OTHER SURGICAL HISTORY  04/28/2014    Needle Biopsy Of Prostate    OTHER SURGICAL HISTORY  03/05/2021    Complete colonoscopy    OTHER SURGICAL HISTORY  03/05/2021    Endoscopy    OTHER SURGICAL HISTORY  10/08/2018    Common Bile Duct Stone Removal    PROSTATE SURGERY  10/08/2018    Prostate Surgery   [3]   Family History  Problem Relation Name Age of Onset    Heart disease Mother Abiola     Breast cancer Mother Abiola     Lung cancer Father      Cancer Brother Arpit     Cancer Brother Kevin     Cancer Brother Tere     Cancer Brother     [4]   Social History  Tobacco Use    Smoking status: Never    Smokeless tobacco: Never   Vaping Use    Vaping status: Never Used   Substance Use Topics    Alcohol use: Not Currently    Drug use: Never        Sabiha CHAVEZ MD  07/07/25 3262

## 2025-07-08 ENCOUNTER — PATIENT OUTREACH (OUTPATIENT)
Dept: CARE COORDINATION | Facility: CLINIC | Age: OVER 89
End: 2025-07-08
Payer: MEDICARE

## 2025-07-08 ENCOUNTER — HOSPITAL ENCOUNTER (OUTPATIENT)
Dept: CARDIOLOGY | Facility: HOSPITAL | Age: OVER 89
Discharge: HOME | End: 2025-07-08
Payer: MEDICARE

## 2025-07-08 LAB
ATRIAL RATE: 81 BPM
ATRIAL RATE: 86 BPM
HOLD SPECIMEN: NORMAL
P AXIS: 20 DEGREES
P AXIS: 29 DEGREES
P OFFSET: 166 MS
P OFFSET: 172 MS
P ONSET: 105 MS
P ONSET: 112 MS
PR INTERVAL: 196 MS
PR INTERVAL: 200 MS
Q ONSET: 205 MS
Q ONSET: 210 MS
QRS COUNT: 13 BEATS
QRS COUNT: 14 BEATS
QRS DURATION: 116 MS
QRS DURATION: 126 MS
QT INTERVAL: 352 MS
QT INTERVAL: 364 MS
QTC CALCULATION(BAZETT): 421 MS
QTC CALCULATION(BAZETT): 422 MS
QTC FREDERICIA: 397 MS
QTC FREDERICIA: 402 MS
R AXIS: -63 DEGREES
R AXIS: -64 DEGREES
T AXIS: 74 DEGREES
T AXIS: 75 DEGREES
T OFFSET: 386 MS
T OFFSET: 387 MS
VENTRICULAR RATE: 81 BPM
VENTRICULAR RATE: 86 BPM

## 2025-07-08 PROCEDURE — 93005 ELECTROCARDIOGRAM TRACING: CPT

## 2025-07-08 NOTE — PROGRESS NOTES
[facility]  ED Visit 7/7/2025  C/C: Tremors/Shaking, Concern for Seizures  Dx:  Tremors/Shaking UTI    Discharged on Cephalin     Outreach call to patient to support a smooth transition of care from recent admission. Left voicemail message with CM name and contact number.    Rachel Lopez RN/CM   [facility]  [phone]

## 2025-07-09 LAB — BACTERIA UR CULT: NO GROWTH

## 2025-07-22 ENCOUNTER — TELEPHONE (OUTPATIENT)
Dept: DERMATOLOGY | Facility: CLINIC | Age: OVER 89
End: 2025-07-22
Payer: MEDICARE

## 2025-07-22 NOTE — TELEPHONE ENCOUNTER
Spoke with pt daughter Jennifer regarding granulating wound on pts scalp from 6/4/25. States the wound is healing well. No concerns/questions at this time.

## 2025-07-30 LAB
ATRIAL RATE: 81 BPM
ATRIAL RATE: 86 BPM
P AXIS: 20 DEGREES
P AXIS: 29 DEGREES
P OFFSET: 166 MS
P OFFSET: 172 MS
P ONSET: 105 MS
P ONSET: 112 MS
PR INTERVAL: 196 MS
PR INTERVAL: 200 MS
Q ONSET: 205 MS
Q ONSET: 210 MS
QRS COUNT: 13 BEATS
QRS COUNT: 14 BEATS
QRS DURATION: 116 MS
QRS DURATION: 126 MS
QT INTERVAL: 352 MS
QT INTERVAL: 364 MS
QTC CALCULATION(BAZETT): 421 MS
QTC CALCULATION(BAZETT): 422 MS
QTC FREDERICIA: 397 MS
QTC FREDERICIA: 402 MS
R AXIS: -63 DEGREES
R AXIS: -64 DEGREES
T AXIS: 74 DEGREES
T AXIS: 75 DEGREES
T OFFSET: 386 MS
T OFFSET: 387 MS
VENTRICULAR RATE: 81 BPM
VENTRICULAR RATE: 86 BPM